# Patient Record
Sex: FEMALE | Race: WHITE | NOT HISPANIC OR LATINO
[De-identification: names, ages, dates, MRNs, and addresses within clinical notes are randomized per-mention and may not be internally consistent; named-entity substitution may affect disease eponyms.]

---

## 2017-01-19 ENCOUNTER — RX ONLY (OUTPATIENT)
Age: 57
Setting detail: RX ONLY
End: 2017-01-19

## 2017-08-16 ENCOUNTER — HOSPITAL ENCOUNTER (OUTPATIENT)
Dept: LAB | Facility: MEDICAL CENTER | Age: 57
End: 2017-08-16
Attending: NURSE PRACTITIONER
Payer: COMMERCIAL

## 2017-08-16 LAB
HCV AB SER QL: NEGATIVE
THYROPEROXIDASE AB SERPL-ACNC: 0.9 IU/ML (ref 0–9)

## 2017-08-16 PROCEDURE — 83516 IMMUNOASSAY NONANTIBODY: CPT | Mod: 91

## 2017-08-16 PROCEDURE — 86376 MICROSOMAL ANTIBODY EACH: CPT

## 2017-08-16 PROCEDURE — 86803 HEPATITIS C AB TEST: CPT

## 2017-08-16 PROCEDURE — 36415 COLL VENOUS BLD VENIPUNCTURE: CPT

## 2017-08-18 LAB
GLIADIN IGA SER IA-ACNC: 8 UNITS (ref 0–19)
GLIADIN IGG SER IA-ACNC: 5 UNITS (ref 0–19)
TTG IGA SER IA-ACNC: 0 U/ML (ref 0–3)
TTG IGG SER IA-ACNC: 1 U/ML (ref 0–5)

## 2017-11-20 ENCOUNTER — HOSPITAL ENCOUNTER (OUTPATIENT)
Dept: RADIOLOGY | Facility: MEDICAL CENTER | Age: 57
End: 2017-11-20
Attending: FAMILY MEDICINE
Payer: COMMERCIAL

## 2017-11-20 DIAGNOSIS — Z12.39 SCREENING BREAST EXAMINATION: ICD-10-CM

## 2017-11-20 PROCEDURE — G0202 SCR MAMMO BI INCL CAD: HCPCS

## 2017-12-05 ENCOUNTER — HOSPITAL ENCOUNTER (OUTPATIENT)
Dept: RADIOLOGY | Facility: MEDICAL CENTER | Age: 57
End: 2017-12-05
Attending: FAMILY MEDICINE
Payer: COMMERCIAL

## 2017-12-05 DIAGNOSIS — R92.30 DENSE BREAST TISSUE: ICD-10-CM

## 2017-12-05 PROCEDURE — 76641 ULTRASOUND BREAST COMPLETE: CPT

## 2018-01-12 ENCOUNTER — HOSPITAL ENCOUNTER (OUTPATIENT)
Dept: RADIOLOGY | Facility: MEDICAL CENTER | Age: 58
End: 2018-01-12
Attending: FAMILY MEDICINE
Payer: COMMERCIAL

## 2018-01-12 DIAGNOSIS — E03.9 PRIMARY HYPOTHYROIDISM: ICD-10-CM

## 2018-01-12 PROCEDURE — 76536 US EXAM OF HEAD AND NECK: CPT

## 2018-01-24 ENCOUNTER — APPOINTMENT (RX ONLY)
Dept: URBAN - METROPOLITAN AREA CLINIC 4 | Facility: CLINIC | Age: 58
Setting detail: DERMATOLOGY
End: 2018-01-24

## 2018-01-24 DIAGNOSIS — D485 NEOPLASM OF UNCERTAIN BEHAVIOR OF SKIN: ICD-10-CM

## 2018-01-24 DIAGNOSIS — L81.4 OTHER MELANIN HYPERPIGMENTATION: ICD-10-CM

## 2018-01-24 DIAGNOSIS — D18.0 HEMANGIOMA: ICD-10-CM

## 2018-01-24 DIAGNOSIS — L82.0 INFLAMED SEBORRHEIC KERATOSIS: ICD-10-CM

## 2018-01-24 DIAGNOSIS — D22 MELANOCYTIC NEVI: ICD-10-CM

## 2018-01-24 DIAGNOSIS — L82.1 OTHER SEBORRHEIC KERATOSIS: ICD-10-CM

## 2018-01-24 PROBLEM — D18.01 HEMANGIOMA OF SKIN AND SUBCUTANEOUS TISSUE: Status: ACTIVE | Noted: 2018-01-24

## 2018-01-24 PROBLEM — E03.9 HYPOTHYROIDISM, UNSPECIFIED: Status: ACTIVE | Noted: 2018-01-24

## 2018-01-24 PROBLEM — D22.5 MELANOCYTIC NEVI OF TRUNK: Status: ACTIVE | Noted: 2018-01-24

## 2018-01-24 PROBLEM — D22.62 MELANOCYTIC NEVI OF LEFT UPPER LIMB, INCLUDING SHOULDER: Status: ACTIVE | Noted: 2018-01-24

## 2018-01-24 PROBLEM — D22.61 MELANOCYTIC NEVI OF RIGHT UPPER LIMB, INCLUDING SHOULDER: Status: ACTIVE | Noted: 2018-01-24

## 2018-01-24 PROBLEM — D48.5 NEOPLASM OF UNCERTAIN BEHAVIOR OF SKIN: Status: ACTIVE | Noted: 2018-01-24

## 2018-01-24 PROBLEM — D22.72 MELANOCYTIC NEVI OF LEFT LOWER LIMB, INCLUDING HIP: Status: ACTIVE | Noted: 2018-01-24

## 2018-01-24 PROBLEM — D22.71 MELANOCYTIC NEVI OF RIGHT LOWER LIMB, INCLUDING HIP: Status: ACTIVE | Noted: 2018-01-24

## 2018-01-24 PROCEDURE — 11100: CPT | Mod: 59

## 2018-01-24 PROCEDURE — 99214 OFFICE O/P EST MOD 30 MIN: CPT | Mod: 25

## 2018-01-24 PROCEDURE — ? LIQUID NITROGEN

## 2018-01-24 PROCEDURE — ? COUNSELING

## 2018-01-24 PROCEDURE — ? SUNSCREEN RECOMMENDATIONS

## 2018-01-24 PROCEDURE — ? BIOPSY BY SHAVE METHOD

## 2018-01-24 PROCEDURE — 17110 DESTRUCTION B9 LES UP TO 14: CPT

## 2018-01-24 ASSESSMENT — LOCATION DETAILED DESCRIPTION DERM
LOCATION DETAILED: LEFT INFERIOR ANTERIOR NECK
LOCATION DETAILED: RIGHT CENTRAL MALAR CHEEK
LOCATION DETAILED: RIGHT DISTAL POSTERIOR UPPER ARM
LOCATION DETAILED: RIGHT PROXIMAL DORSAL FOREARM
LOCATION DETAILED: RIGHT RADIAL DORSAL HAND
LOCATION DETAILED: LEFT CENTRAL MALAR CHEEK
LOCATION DETAILED: LEFT PROXIMAL POSTERIOR UPPER ARM
LOCATION DETAILED: LEFT SUPERIOR MEDIAL UPPER BACK
LOCATION DETAILED: RIGHT SUPERIOR MEDIAL MIDBACK
LOCATION DETAILED: SUPERIOR THORACIC SPINE
LOCATION DETAILED: RIGHT ANTERIOR PROXIMAL THIGH
LOCATION DETAILED: LEFT ULNAR DORSAL HAND
LOCATION DETAILED: PERIUMBILICAL SKIN
LOCATION DETAILED: RIGHT RIB CAGE
LOCATION DETAILED: RIGHT MEDIAL UPPER BACK
LOCATION DETAILED: LEFT PROXIMAL DORSAL FOREARM
LOCATION DETAILED: LEFT ANTERIOR PROXIMAL THIGH

## 2018-01-24 ASSESSMENT — LOCATION SIMPLE DESCRIPTION DERM
LOCATION SIMPLE: LEFT POSTERIOR UPPER ARM
LOCATION SIMPLE: RIGHT CHEEK
LOCATION SIMPLE: LEFT ANTERIOR NECK
LOCATION SIMPLE: RIGHT LOWER BACK
LOCATION SIMPLE: ABDOMEN
LOCATION SIMPLE: LEFT CHEEK
LOCATION SIMPLE: LEFT FOREARM
LOCATION SIMPLE: RIGHT UPPER BACK
LOCATION SIMPLE: RIGHT FOREARM
LOCATION SIMPLE: UPPER BACK
LOCATION SIMPLE: RIGHT POSTERIOR UPPER ARM
LOCATION SIMPLE: LEFT THIGH
LOCATION SIMPLE: RIGHT HAND
LOCATION SIMPLE: LEFT UPPER BACK
LOCATION SIMPLE: LEFT HAND
LOCATION SIMPLE: RIGHT THIGH

## 2018-01-24 ASSESSMENT — LOCATION ZONE DERM
LOCATION ZONE: NECK
LOCATION ZONE: HAND
LOCATION ZONE: ARM
LOCATION ZONE: FACE
LOCATION ZONE: LEG
LOCATION ZONE: TRUNK

## 2018-01-24 NOTE — PROCEDURE: BIOPSY BY SHAVE METHOD
Bill [id number] For Specimen Handling/Conveyance To Laboratory?: no
Cryotherapy Text: The wound bed was treated with cryotherapy after the biopsy was performed.
Wound Care: Vaseline
Detail Level: Detailed
Electrodesiccation And Curettage Text: The wound bed was treated with electrodesiccation and curettage after the biopsy was performed.
X Size Of Lesion In Cm: 0
Silver Nitrate Text: The wound bed was treated with silver nitrate after the biopsy was performed.
Consent: Written consent was obtained and risks were reviewed including but not limited to scarring, infection, bleeding, scabbing, incomplete removal, nerve damage and allergy to anesthesia.
Anesthesia Volume In Cc: 2
Notification Instructions: Patient will be notified of biopsy results. However, patient instructed to call the office if not contacted within 2 weeks.
Billing Type: Third-Party Bill
Destruction After The Procedure: Yes
Lab Facility: 
Biopsy Method: Personna blade
Type Of Destruction Used: Electrodesiccation and Curettage
Electrodesiccation Text: The wound bed was treated with electrodesiccation after the biopsy was performed.
Anesthesia Type: 1% lidocaine with epinephrine
Hemostasis: Aluminum Chloride and Electrocautery
Lab: [id number]
Biopsy Type: H and E
Post-Care Instructions: I reviewed with the patient in detail post-care instructions. Patient is to keep the biopsy site dry overnight, and then apply bacitracin twice daily until healed. Patient may apply hydrogen peroxide soaks to remove any crusting.
Dressing: bandage
Curettage Text: The wound bed was treated with curettage after the biopsy was performed.

## 2018-01-24 NOTE — PROCEDURE: LIQUID NITROGEN
Consent: The patient's consent was obtained including but not limited to risks of crusting, scabbing, blistering, scarring, darker or lighter pigmentary change, recurrence, incomplete removal and infection.
Medical Necessity Clause: This procedure was medically necessary because the lesions that were treated were:
Post-Care Instructions: I reviewed with the patient in detail post-care instructions. Patient is to wear sunprotection, and avoid picking at any of the treated lesions. Pt may apply Vaseline to crusted or scabbing areas.
Include Z78.9 (Other Specified Conditions Influencing Health Status) As An Associated Diagnosis?: No
Detail Level: Detailed
Medical Necessity Information: It is in your best interest to select a reason for this procedure from the list below. All of these items fulfill various CMS LCD requirements except the new and changing color options.

## 2018-04-05 ENCOUNTER — APPOINTMENT (RX ONLY)
Dept: URBAN - METROPOLITAN AREA CLINIC 4 | Facility: CLINIC | Age: 58
Setting detail: DERMATOLOGY
End: 2018-04-05

## 2018-04-05 DIAGNOSIS — L57.0 ACTINIC KERATOSIS: ICD-10-CM

## 2018-04-05 PROCEDURE — ? COUNSELING

## 2018-04-05 PROCEDURE — 17000 DESTRUCT PREMALG LESION: CPT

## 2018-04-05 PROCEDURE — ? LIQUID NITROGEN

## 2018-04-05 ASSESSMENT — LOCATION DETAILED DESCRIPTION DERM
LOCATION DETAILED: RIGHT DISTAL PRETIBIAL REGION
LOCATION DETAILED: LEFT ANTECUBITAL SKIN

## 2018-04-05 ASSESSMENT — LOCATION SIMPLE DESCRIPTION DERM
LOCATION SIMPLE: LEFT UPPER ARM
LOCATION SIMPLE: RIGHT PRETIBIAL REGION

## 2018-04-05 ASSESSMENT — LOCATION ZONE DERM
LOCATION ZONE: ARM
LOCATION ZONE: LEG

## 2018-04-05 NOTE — PROCEDURE: COUNSELING
Detail Level: Zone
Patient Specific Counseling (Will Not Stick From Patient To Patient): Area recently biopsied. Clear margins.

## 2018-06-12 ENCOUNTER — HOSPITAL ENCOUNTER (OUTPATIENT)
Dept: LAB | Facility: MEDICAL CENTER | Age: 58
End: 2018-06-12
Attending: NURSE PRACTITIONER
Payer: COMMERCIAL

## 2018-06-12 LAB
25(OH)D3 SERPL-MCNC: 59 NG/ML (ref 30–100)
ALBUMIN SERPL BCP-MCNC: 4.2 G/DL (ref 3.2–4.9)
ALBUMIN/GLOB SERPL: 1.6 G/DL
ALP SERPL-CCNC: 38 U/L (ref 30–99)
ALT SERPL-CCNC: 17 U/L (ref 2–50)
ANION GAP SERPL CALC-SCNC: 6 MMOL/L (ref 0–11.9)
AST SERPL-CCNC: 19 U/L (ref 12–45)
BASOPHILS # BLD AUTO: 1 % (ref 0–1.8)
BASOPHILS # BLD: 0.06 K/UL (ref 0–0.12)
BILIRUB SERPL-MCNC: 0.3 MG/DL (ref 0.1–1.5)
BUN SERPL-MCNC: 20 MG/DL (ref 8–22)
CALCIUM SERPL-MCNC: 8.7 MG/DL (ref 8.5–10.5)
CHLORIDE SERPL-SCNC: 105 MMOL/L (ref 96–112)
CHOLEST SERPL-MCNC: 158 MG/DL (ref 100–199)
CO2 SERPL-SCNC: 26 MMOL/L (ref 20–33)
CREAT SERPL-MCNC: 0.87 MG/DL (ref 0.5–1.4)
DHEA-S SERPL-MCNC: 67.7 UG/DL (ref 18.9–205)
EOSINOPHIL # BLD AUTO: 0.15 K/UL (ref 0–0.51)
EOSINOPHIL NFR BLD: 2.6 % (ref 0–6.9)
ERYTHROCYTE [DISTWIDTH] IN BLOOD BY AUTOMATED COUNT: 47.4 FL (ref 35.9–50)
ESTRADIOL SERPL-MCNC: 33 PG/ML
GLOBULIN SER CALC-MCNC: 2.7 G/DL (ref 1.9–3.5)
GLUCOSE SERPL-MCNC: 85 MG/DL (ref 65–99)
HCT VFR BLD AUTO: 40.5 % (ref 37–47)
HDLC SERPL-MCNC: 89 MG/DL
HGB BLD-MCNC: 13.3 G/DL (ref 12–16)
IMM GRANULOCYTES # BLD AUTO: 0.02 K/UL (ref 0–0.11)
IMM GRANULOCYTES NFR BLD AUTO: 0.3 % (ref 0–0.9)
LDLC SERPL CALC-MCNC: 62 MG/DL
LYMPHOCYTES # BLD AUTO: 1.54 K/UL (ref 1–4.8)
LYMPHOCYTES NFR BLD: 26.2 % (ref 22–41)
MCH RBC QN AUTO: 31 PG (ref 27–33)
MCHC RBC AUTO-ENTMCNC: 32.8 G/DL (ref 33.6–35)
MCV RBC AUTO: 94.4 FL (ref 81.4–97.8)
MONOCYTES # BLD AUTO: 0.36 K/UL (ref 0–0.85)
MONOCYTES NFR BLD AUTO: 6.1 % (ref 0–13.4)
NEUTROPHILS # BLD AUTO: 3.74 K/UL (ref 2–7.15)
NEUTROPHILS NFR BLD: 63.8 % (ref 44–72)
NRBC # BLD AUTO: 0 K/UL
NRBC BLD-RTO: 0 /100 WBC
PLATELET # BLD AUTO: 209 K/UL (ref 164–446)
PMV BLD AUTO: 12.9 FL (ref 9–12.9)
POTASSIUM SERPL-SCNC: 4.2 MMOL/L (ref 3.6–5.5)
PROGEST SERPL-MCNC: 0.95 NG/ML
PROT SERPL-MCNC: 6.9 G/DL (ref 6–8.2)
RBC # BLD AUTO: 4.29 M/UL (ref 4.2–5.4)
SODIUM SERPL-SCNC: 137 MMOL/L (ref 135–145)
T3FREE SERPL-MCNC: 2.68 PG/ML (ref 2.4–4.2)
T4 FREE SERPL-MCNC: 0.75 NG/DL (ref 0.53–1.43)
TRIGL SERPL-MCNC: 35 MG/DL (ref 0–149)
TSH SERPL DL<=0.005 MIU/L-ACNC: 1.08 UIU/ML (ref 0.38–5.33)
WBC # BLD AUTO: 5.9 K/UL (ref 4.8–10.8)

## 2018-06-12 PROCEDURE — 85025 COMPLETE CBC W/AUTO DIFF WBC: CPT

## 2018-06-12 PROCEDURE — 82306 VITAMIN D 25 HYDROXY: CPT

## 2018-06-12 PROCEDURE — 82670 ASSAY OF TOTAL ESTRADIOL: CPT

## 2018-06-12 PROCEDURE — 82627 DEHYDROEPIANDROSTERONE: CPT

## 2018-06-12 PROCEDURE — 84144 ASSAY OF PROGESTERONE: CPT

## 2018-06-12 PROCEDURE — 80053 COMPREHEN METABOLIC PANEL: CPT

## 2018-06-12 PROCEDURE — 84439 ASSAY OF FREE THYROXINE: CPT

## 2018-06-12 PROCEDURE — 36415 COLL VENOUS BLD VENIPUNCTURE: CPT

## 2018-06-12 PROCEDURE — 80061 LIPID PANEL: CPT

## 2018-06-12 PROCEDURE — 84402 ASSAY OF FREE TESTOSTERONE: CPT

## 2018-06-12 PROCEDURE — 84443 ASSAY THYROID STIM HORMONE: CPT

## 2018-06-12 PROCEDURE — 84481 FREE ASSAY (FT-3): CPT

## 2018-06-17 LAB — TESTOST FREE SERPL-MCNC: 1.7 PG/ML (ref 0.6–3.8)

## 2018-07-10 ENCOUNTER — APPOINTMENT (RX ONLY)
Dept: URBAN - METROPOLITAN AREA CLINIC 4 | Facility: CLINIC | Age: 58
Setting detail: DERMATOLOGY
End: 2018-07-10

## 2018-07-10 DIAGNOSIS — L81.4 OTHER MELANIN HYPERPIGMENTATION: ICD-10-CM

## 2018-07-10 DIAGNOSIS — L82.0 INFLAMED SEBORRHEIC KERATOSIS: ICD-10-CM

## 2018-07-10 DIAGNOSIS — D485 NEOPLASM OF UNCERTAIN BEHAVIOR OF SKIN: ICD-10-CM

## 2018-07-10 DIAGNOSIS — D18.0 HEMANGIOMA: ICD-10-CM

## 2018-07-10 DIAGNOSIS — L82.1 OTHER SEBORRHEIC KERATOSIS: ICD-10-CM

## 2018-07-10 DIAGNOSIS — D22 MELANOCYTIC NEVI: ICD-10-CM

## 2018-07-10 PROBLEM — D22.5 MELANOCYTIC NEVI OF TRUNK: Status: ACTIVE | Noted: 2018-07-10

## 2018-07-10 PROBLEM — D18.01 HEMANGIOMA OF SKIN AND SUBCUTANEOUS TISSUE: Status: ACTIVE | Noted: 2018-07-10

## 2018-07-10 PROBLEM — D22.61 MELANOCYTIC NEVI OF RIGHT UPPER LIMB, INCLUDING SHOULDER: Status: ACTIVE | Noted: 2018-07-10

## 2018-07-10 PROBLEM — D22.71 MELANOCYTIC NEVI OF RIGHT LOWER LIMB, INCLUDING HIP: Status: ACTIVE | Noted: 2018-07-10

## 2018-07-10 PROBLEM — D22.72 MELANOCYTIC NEVI OF LEFT LOWER LIMB, INCLUDING HIP: Status: ACTIVE | Noted: 2018-07-10

## 2018-07-10 PROBLEM — D48.5 NEOPLASM OF UNCERTAIN BEHAVIOR OF SKIN: Status: ACTIVE | Noted: 2018-07-10

## 2018-07-10 PROBLEM — D22.62 MELANOCYTIC NEVI OF LEFT UPPER LIMB, INCLUDING SHOULDER: Status: ACTIVE | Noted: 2018-07-10

## 2018-07-10 PROCEDURE — ? LIQUID NITROGEN

## 2018-07-10 PROCEDURE — ? COUNSELING

## 2018-07-10 PROCEDURE — 17110 DESTRUCTION B9 LES UP TO 14: CPT

## 2018-07-10 PROCEDURE — 11100: CPT | Mod: 59

## 2018-07-10 PROCEDURE — ? SUNSCREEN RECOMMENDATIONS

## 2018-07-10 PROCEDURE — ? BIOPSY BY SHAVE METHOD

## 2018-07-10 PROCEDURE — 99213 OFFICE O/P EST LOW 20 MIN: CPT | Mod: 25

## 2018-07-10 ASSESSMENT — LOCATION SIMPLE DESCRIPTION DERM
LOCATION SIMPLE: LEFT ANTERIOR NECK
LOCATION SIMPLE: RIGHT HAND
LOCATION SIMPLE: LEFT FOREARM
LOCATION SIMPLE: LEFT UPPER BACK
LOCATION SIMPLE: RIGHT LOWER BACK
LOCATION SIMPLE: UPPER BACK
LOCATION SIMPLE: LEFT HAND
LOCATION SIMPLE: LEFT FOREHEAD
LOCATION SIMPLE: CHEST
LOCATION SIMPLE: LEFT TEMPLE
LOCATION SIMPLE: RIGHT CHEEK
LOCATION SIMPLE: RIGHT THIGH
LOCATION SIMPLE: LEFT POSTERIOR UPPER ARM
LOCATION SIMPLE: RIGHT POSTERIOR UPPER ARM
LOCATION SIMPLE: ABDOMEN
LOCATION SIMPLE: RIGHT FOREARM
LOCATION SIMPLE: LEFT THIGH
LOCATION SIMPLE: LEFT CHEEK
LOCATION SIMPLE: LEFT ZYGOMA

## 2018-07-10 ASSESSMENT — LOCATION DETAILED DESCRIPTION DERM
LOCATION DETAILED: PERIUMBILICAL SKIN
LOCATION DETAILED: RIGHT LATERAL SUPERIOR CHEST
LOCATION DETAILED: LEFT CENTRAL MALAR CHEEK
LOCATION DETAILED: LEFT ULNAR DORSAL HAND
LOCATION DETAILED: LEFT INFERIOR LATERAL FOREHEAD
LOCATION DETAILED: LEFT MID PREAURICULAR CHEEK
LOCATION DETAILED: RIGHT SUPERIOR LATERAL MALAR CHEEK
LOCATION DETAILED: RIGHT DISTAL POSTERIOR UPPER ARM
LOCATION DETAILED: LEFT INFERIOR ANTERIOR NECK
LOCATION DETAILED: RIGHT CENTRAL MALAR CHEEK
LOCATION DETAILED: LEFT PROXIMAL POSTERIOR UPPER ARM
LOCATION DETAILED: RIGHT RIB CAGE
LOCATION DETAILED: RIGHT ANTERIOR PROXIMAL THIGH
LOCATION DETAILED: RIGHT RADIAL DORSAL HAND
LOCATION DETAILED: LEFT ANTERIOR PROXIMAL THIGH
LOCATION DETAILED: RIGHT SUPERIOR MEDIAL MIDBACK
LOCATION DETAILED: LEFT CENTRAL TEMPLE
LOCATION DETAILED: SUPERIOR THORACIC SPINE
LOCATION DETAILED: LEFT SUPERIOR LATERAL BUCCAL CHEEK
LOCATION DETAILED: LEFT CENTRAL ZYGOMA
LOCATION DETAILED: RIGHT PROXIMAL DORSAL FOREARM
LOCATION DETAILED: LEFT PROXIMAL DORSAL FOREARM
LOCATION DETAILED: LEFT INFERIOR CENTRAL MALAR CHEEK
LOCATION DETAILED: RIGHT LATERAL MALAR CHEEK
LOCATION DETAILED: LEFT SUPERIOR MEDIAL UPPER BACK
LOCATION DETAILED: RIGHT LATERAL BUCCAL CHEEK

## 2018-07-10 ASSESSMENT — LOCATION ZONE DERM
LOCATION ZONE: ARM
LOCATION ZONE: LEG
LOCATION ZONE: FACE
LOCATION ZONE: NECK
LOCATION ZONE: TRUNK
LOCATION ZONE: HAND

## 2018-07-19 ENCOUNTER — HOSPITAL ENCOUNTER (OUTPATIENT)
Dept: LAB | Facility: MEDICAL CENTER | Age: 58
End: 2018-07-19
Attending: FAMILY MEDICINE
Payer: COMMERCIAL

## 2018-07-19 PROCEDURE — 88175 CYTOPATH C/V AUTO FLUID REDO: CPT

## 2018-07-20 LAB — CYTOLOGY REG CYTOL: NORMAL

## 2018-08-04 ENCOUNTER — OFFICE VISIT (OUTPATIENT)
Dept: URGENT CARE | Facility: PHYSICIAN GROUP | Age: 58
End: 2018-08-04
Payer: COMMERCIAL

## 2018-08-04 VITALS
BODY MASS INDEX: 24.11 KG/M2 | RESPIRATION RATE: 14 BRPM | SYSTOLIC BLOOD PRESSURE: 118 MMHG | OXYGEN SATURATION: 96 % | HEIGHT: 66 IN | HEART RATE: 64 BPM | DIASTOLIC BLOOD PRESSURE: 78 MMHG | WEIGHT: 150 LBS | TEMPERATURE: 98.8 F

## 2018-08-04 DIAGNOSIS — J22 LRTI (LOWER RESPIRATORY TRACT INFECTION): ICD-10-CM

## 2018-08-04 PROCEDURE — 99214 OFFICE O/P EST MOD 30 MIN: CPT | Performed by: FAMILY MEDICINE

## 2018-08-04 RX ORDER — BENZONATATE 100 MG/1
100 CAPSULE ORAL 3 TIMES DAILY PRN
Qty: 60 CAP | Refills: 0 | Status: SHIPPED | OUTPATIENT
Start: 2018-08-04 | End: 2018-11-01

## 2018-08-04 RX ORDER — ASCORBIC ACID 500 MG
500 TABLET ORAL DAILY
COMMUNITY
End: 2020-10-27

## 2018-08-04 RX ORDER — AZITHROMYCIN 250 MG/1
TABLET, FILM COATED ORAL
Qty: 6 TAB | Refills: 0 | Status: SHIPPED | OUTPATIENT
Start: 2018-08-04 | End: 2018-11-01

## 2018-08-04 NOTE — PATIENT INSTRUCTIONS

## 2018-08-08 ASSESSMENT — ENCOUNTER SYMPTOMS
WHEEZING: 1
COUGH: 1
CHILLS: 0
SHORTNESS OF BREATH: 1
FEVER: 0

## 2018-08-08 NOTE — PROGRESS NOTES
"Subjective:   Lizy Saleem is a 58 y.o. female who presents for Cough (Headache x 11 days)        Cough   This is a new problem. The current episode started 1 to 4 weeks ago. The problem has been gradually worsening. The problem occurs every few minutes. The cough is productive of sputum. Associated symptoms include shortness of breath and wheezing. Pertinent negatives include no chills or fever.     Review of Systems   Constitutional: Negative for chills and fever.   Respiratory: Positive for cough, shortness of breath and wheezing.      Allergies   Allergen Reactions   • Bloodless       Objective:   /78   Pulse 64   Temp 37.1 °C (98.8 °F)   Resp 14   Ht 1.676 m (5' 6\")   Wt 68 kg (150 lb)   SpO2 96%   BMI 24.21 kg/m²   Physical Exam   Constitutional: She is oriented to person, place, and time. She appears well-developed and well-nourished. No distress.   HENT:   Head: Normocephalic and atraumatic.   Eyes: Pupils are equal, round, and reactive to light. Conjunctivae and EOM are normal.   Cardiovascular: Normal rate and regular rhythm.    No murmur heard.  Pulmonary/Chest: Effort normal. No respiratory distress. She has wheezes. She has no rales.   Abdominal: Soft. She exhibits no distension. There is no tenderness.   Neurological: She is alert and oriented to person, place, and time. She has normal reflexes. No sensory deficit.   Skin: Skin is warm and dry.   Psychiatric: She has a normal mood and affect.         Assessment/Plan:   Assessment    1. LRTI (lower respiratory tract infection)  - azithromycin (ZITHROMAX) 250 MG Tab; Take 2 tablets by mouth on day one. Take one tablet by mouth the remaining days until gone  Dispense: 6 Tab; Refill: 0  - benzonatate (TESSALON) 100 MG Cap; Take 1 Cap by mouth 3 times a day as needed for Cough.  Dispense: 60 Cap; Refill: 0    Other orders  - ascorbic acid (ASCORBIC ACID) 500 MG Tab; Take 500 mg by mouth every day.  - PROGESTERONE, VAGINAL, 4 % Gel; " Insert  in vagina.    Differential diagnosis, natural history, supportive care, and indications for immediate follow-up discussed.

## 2018-10-08 ENCOUNTER — OFFICE VISIT (OUTPATIENT)
Dept: URGENT CARE | Facility: PHYSICIAN GROUP | Age: 58
End: 2018-10-08
Payer: COMMERCIAL

## 2018-10-08 VITALS
BODY MASS INDEX: 24.11 KG/M2 | RESPIRATION RATE: 16 BRPM | WEIGHT: 150 LBS | TEMPERATURE: 97 F | HEART RATE: 69 BPM | OXYGEN SATURATION: 98 % | SYSTOLIC BLOOD PRESSURE: 124 MMHG | DIASTOLIC BLOOD PRESSURE: 92 MMHG | HEIGHT: 66 IN

## 2018-10-08 DIAGNOSIS — J01.00 ACUTE NON-RECURRENT MAXILLARY SINUSITIS: ICD-10-CM

## 2018-10-08 PROCEDURE — 99214 OFFICE O/P EST MOD 30 MIN: CPT | Performed by: FAMILY MEDICINE

## 2018-10-08 RX ORDER — ACETAMINOPHEN 325 MG/1
650 TABLET ORAL EVERY 4 HOURS PRN
COMMUNITY
End: 2020-12-23

## 2018-10-08 RX ORDER — AMOXICILLIN AND CLAVULANATE POTASSIUM 875; 125 MG/1; MG/1
1 TABLET, FILM COATED ORAL 2 TIMES DAILY
Qty: 14 TAB | Refills: 0 | Status: SHIPPED | OUTPATIENT
Start: 2018-10-08 | End: 2018-10-15

## 2018-10-08 NOTE — PROGRESS NOTES
"Subjective:   Lizy Saleem is a 58 y.o. female who presents for Cough ([id number] )        Cough   This is a new problem. The current episode started 1 to 4 weeks ago. The problem has been gradually worsening. The problem occurs every few minutes. The cough is productive of sputum. Associated symptoms include headaches, nasal congestion and postnasal drip. Pertinent negatives include no chills, fever, rhinorrhea, shortness of breath or wheezing. Nothing aggravates the symptoms.     Review of Systems   Constitutional: Negative for chills and fever.   HENT: Positive for postnasal drip. Negative for rhinorrhea.    Respiratory: Positive for cough. Negative for shortness of breath and wheezing.    Neurological: Positive for headaches.     Allergies   Allergen Reactions   • Bloodless       Objective:   /92 (BP Location: Left arm, Patient Position: Sitting, BP Cuff Size: Adult)   Pulse 69   Temp 36.1 °C (97 °F) (Temporal)   Resp 16   Ht 1.676 m (5' 6\")   Wt 68 kg (150 lb)   SpO2 98%   BMI 24.21 kg/m²   Physical Exam   Constitutional: She is oriented to person, place, and time. She appears well-developed and well-nourished. No distress.   HENT:   Head: Normocephalic and atraumatic.   Nose: Mucosal edema and rhinorrhea present. Right sinus exhibits maxillary sinus tenderness. Left sinus exhibits maxillary sinus tenderness.   Eyes: Pupils are equal, round, and reactive to light. Conjunctivae and EOM are normal.   Cardiovascular: Normal rate and regular rhythm.    No murmur heard.  Pulmonary/Chest: Effort normal and breath sounds normal. No respiratory distress. She has no wheezes. She has no rales.   Abdominal: Soft. She exhibits no distension. There is no tenderness.   Neurological: She is alert and oriented to person, place, and time. She has normal reflexes. No sensory deficit.   Skin: Skin is warm and dry.   Psychiatric: She has a normal mood and affect.         Assessment/Plan:   Assessment  "   1. Acute non-recurrent maxillary sinusitis  - amoxicillin-clavulanate (AUGMENTIN) 875-125 MG Tab; Take 1 Tab by mouth 2 times a day for 7 days.  Dispense: 14 Tab; Refill: 0    Other orders  - acetaminophen (TYLENOL) 325 MG Tab; Take 650 mg by mouth every four hours as needed.    Differential diagnosis, natural history, supportive care, and indications for immediate follow-up discussed.

## 2018-10-11 ASSESSMENT — ENCOUNTER SYMPTOMS
COUGH: 1
FEVER: 0
WHEEZING: 0
SHORTNESS OF BREATH: 0
HEADACHES: 1
RHINORRHEA: 0
CHILLS: 0

## 2018-10-29 ENCOUNTER — APPOINTMENT (RX ONLY)
Dept: URBAN - METROPOLITAN AREA CLINIC 4 | Facility: CLINIC | Age: 58
Setting detail: DERMATOLOGY
End: 2018-10-29

## 2018-10-29 DIAGNOSIS — L57.0 ACTINIC KERATOSIS: ICD-10-CM

## 2018-10-29 DIAGNOSIS — L82.0 INFLAMED SEBORRHEIC KERATOSIS: ICD-10-CM

## 2018-10-29 DIAGNOSIS — L82.1 OTHER SEBORRHEIC KERATOSIS: ICD-10-CM

## 2018-10-29 DIAGNOSIS — L81.4 OTHER MELANIN HYPERPIGMENTATION: ICD-10-CM

## 2018-10-29 PROBLEM — D48.5 NEOPLASM OF UNCERTAIN BEHAVIOR OF SKIN: Status: ACTIVE | Noted: 2018-10-29

## 2018-10-29 PROCEDURE — ? BIOPSY BY SHAVE METHOD

## 2018-10-29 PROCEDURE — ? ADDITIONAL NOTES

## 2018-10-29 PROCEDURE — 11100: CPT | Mod: 59

## 2018-10-29 PROCEDURE — 17000 DESTRUCT PREMALG LESION: CPT

## 2018-10-29 PROCEDURE — 99212 OFFICE O/P EST SF 10 MIN: CPT | Mod: 25

## 2018-10-29 PROCEDURE — ? COUNSELING

## 2018-10-29 PROCEDURE — ? LIQUID NITROGEN

## 2018-10-29 ASSESSMENT — LOCATION SIMPLE DESCRIPTION DERM
LOCATION SIMPLE: NOSE
LOCATION SIMPLE: RIGHT POSTERIOR UPPER ARM
LOCATION SIMPLE: LEFT HAND
LOCATION SIMPLE: RIGHT HAND

## 2018-10-29 ASSESSMENT — LOCATION DETAILED DESCRIPTION DERM
LOCATION DETAILED: RIGHT ULNAR DORSAL HAND
LOCATION DETAILED: RIGHT DISTAL POSTERIOR UPPER ARM
LOCATION DETAILED: NASAL DORSUM
LOCATION DETAILED: LEFT ULNAR DORSAL HAND
LOCATION DETAILED: RIGHT RADIAL DORSAL HAND

## 2018-10-29 ASSESSMENT — LOCATION ZONE DERM
LOCATION ZONE: NOSE
LOCATION ZONE: ARM
LOCATION ZONE: HAND

## 2018-10-29 NOTE — PROCEDURE: BIOPSY BY SHAVE METHOD
Anesthesia Type: 1% lidocaine with epinephrine
Curettage Text: The wound bed was treated with curettage after the biopsy was performed.
Electrodesiccation Text: The wound bed was treated with electrodesiccation after the biopsy was performed.
Lab Facility: 
Detail Level: Detailed
Type Of Destruction Used: Curettage
Post-Care Instructions: I reviewed with the patient in detail post-care instructions. Patient is to keep the biopsy site dry overnight, and then apply bacitracin twice daily until healed. Patient may apply hydrogen peroxide soaks to remove any crusting.
X Size Of Lesion In Cm: 0
Destruction After The Procedure: No
Was A Bandage Applied: Yes
Consent: Written consent was obtained and risks were reviewed including but not limited to scarring, infection, bleeding, scabbing, incomplete removal, nerve damage and allergy to anesthesia.
Billing Type: Third-Party Bill
Hemostasis: Drysol
Biopsy Type: H and E
Electrodesiccation And Curettage Text: The wound bed was treated with electrodesiccation and curettage after the biopsy was performed.
Lab: [id number]
Notification Instructions: Patient will be notified of biopsy results. However, patient instructed to call the office if not contacted within 2 weeks.
Dressing: bandage
Cryotherapy Text: The wound bed was treated with cryotherapy after the biopsy was performed.
Anesthesia Volume In Cc: 2
Biopsy Method: Personna blade
Silver Nitrate Text: The wound bed was treated with silver nitrate after the biopsy was performed.
Depth Of Biopsy: dermis
Wound Care: Bacitracin

## 2018-10-29 NOTE — PROCEDURE: LIQUID NITROGEN
Consent: The patient's consent was obtained including but not limited to risks of crusting, scabbing, blistering, scarring, darker or lighter pigmentary change, recurrence, incomplete removal and infection.
Number Of Freeze-Thaw Cycles: 2 freeze-thaw cycles
Render Post-Care Instructions In Note?: yes
Post-Care Instructions: I reviewed with the patient in detail post-care instructions. Patient is to wear sunprotection, and avoid picking at any of the treated lesions. Pt may apply Vaseline to crusted or scabbing areas.
Duration Of Freeze Thaw-Cycle (Seconds): 3
Detail Level: Simple

## 2018-10-29 NOTE — HPI: SKIN LESION
Is This A New Presentation, Or A Follow-Up?: Growths
What Type Of Note Output Would You Prefer (Optional)?: Standard Output
How Severe Is Your Skin Lesion?: severe
Has Your Skin Lesion Been Treated?: not been treated

## 2018-11-01 ENCOUNTER — OFFICE VISIT (OUTPATIENT)
Dept: MEDICAL GROUP | Facility: MEDICAL CENTER | Age: 58
End: 2018-11-01
Payer: COMMERCIAL

## 2018-11-01 VITALS
OXYGEN SATURATION: 98 % | WEIGHT: 151 LBS | HEART RATE: 68 BPM | RESPIRATION RATE: 16 BRPM | SYSTOLIC BLOOD PRESSURE: 120 MMHG | DIASTOLIC BLOOD PRESSURE: 78 MMHG | HEIGHT: 66 IN | TEMPERATURE: 98.2 F | BODY MASS INDEX: 24.27 KG/M2

## 2018-11-01 DIAGNOSIS — G47.39 OTHER SLEEP APNEA: ICD-10-CM

## 2018-11-01 DIAGNOSIS — N95.1 MENOPAUSAL SYMPTOM: ICD-10-CM

## 2018-11-01 DIAGNOSIS — E06.3 HYPOTHYROIDISM DUE TO HASHIMOTO'S THYROIDITIS: ICD-10-CM

## 2018-11-01 DIAGNOSIS — E03.8 HYPOTHYROIDISM DUE TO HASHIMOTO'S THYROIDITIS: ICD-10-CM

## 2018-11-01 DIAGNOSIS — G25.81 RESTLESS LEGS: ICD-10-CM

## 2018-11-01 DIAGNOSIS — F41.1 ANXIETY STATE: ICD-10-CM

## 2018-11-01 PROBLEM — Z80.41 FAMILY HISTORY OF MALIGNANT NEOPLASM OF OVARY: Status: RESOLVED | Noted: 2017-11-17 | Resolved: 2018-11-01

## 2018-11-01 PROBLEM — Z80.41 FAMILY HISTORY OF MALIGNANT NEOPLASM OF OVARY: Status: ACTIVE | Noted: 2017-11-17

## 2018-11-01 PROBLEM — N64.52 BREAST DISCHARGE: Status: ACTIVE | Noted: 2018-11-01

## 2018-11-01 PROBLEM — Z00.00 HEALTH CARE MAINTENANCE: Status: ACTIVE | Noted: 2018-11-01

## 2018-11-01 PROBLEM — Z80.3 FAMILY HISTORY OF MALIGNANT NEOPLASM OF BREAST: Status: ACTIVE | Noted: 2017-11-17

## 2018-11-01 PROBLEM — E34.9 DISORDER OF ENDOCRINE SYSTEM: Status: RESOLVED | Noted: 2018-11-01 | Resolved: 2018-11-01

## 2018-11-01 PROBLEM — N64.52 BREAST DISCHARGE: Status: RESOLVED | Noted: 2018-11-01 | Resolved: 2018-11-01

## 2018-11-01 PROBLEM — Z01.419 NORMAL PELVIC EXAM: Status: ACTIVE | Noted: 2018-07-19

## 2018-11-01 PROBLEM — E34.9 DISORDER OF ENDOCRINE SYSTEM: Status: ACTIVE | Noted: 2018-11-01

## 2018-11-01 PROBLEM — E03.9 HYPOTHYROIDISM: Status: ACTIVE | Noted: 2018-11-01

## 2018-11-01 PROBLEM — Z01.419 NORMAL PELVIC EXAM: Status: RESOLVED | Noted: 2018-07-19 | Resolved: 2018-11-01

## 2018-11-01 PROCEDURE — 99214 OFFICE O/P EST MOD 30 MIN: CPT | Performed by: NURSE PRACTITIONER

## 2018-11-01 RX ORDER — LEVOTHYROXINE SODIUM 88 UG/1
88 TABLET ORAL
COMMUNITY
End: 2019-03-04 | Stop reason: SDUPTHER

## 2018-11-01 RX ORDER — VALACYCLOVIR HYDROCHLORIDE 1 G/1
TABLET, FILM COATED ORAL
COMMUNITY
End: 2019-02-08 | Stop reason: SDUPTHER

## 2018-11-01 NOTE — PATIENT INSTRUCTIONS
Restless Legs Syndrome  Introduction  Restless legs syndrome is a condition that causes uncomfortable feelings or sensations in the legs, especially while sitting or lying down. The sensations usually cause an overwhelming urge to move the legs. The arms can also sometimes be affected.  The condition can range from mild to severe. The symptoms often interfere with a person's ability to sleep.  What are the causes?  The cause of this condition is not known.  What increases the risk?  This condition is more likely to develop in:  · People who are older than age 50.  · Pregnant women. In general, restless legs syndrome is more common in women than in men.  · People who have a family history of the condition.  · People who have certain medical conditions, such as iron deficiency, kidney disease, Parkinson disease, or nerve damage.  · People who take certain medicines, such as medicines for high blood pressure, nausea, colds, allergies, depression, and some heart conditions.  What are the signs or symptoms?  The main symptom of this condition is uncomfortable sensations in the legs. These sensations may be:  · Described as pulling, tingling, prickling, throbbing, crawling, or burning.  · Worse while you are sitting or lying down.  · Worse during periods of rest or inactivity.  · Worse at night, often interfering with your sleep.  · Accompanied by a very strong urge to move your legs.  · Temporarily relieved by movement of your legs.  The sensations usually affect both sides of the body. The arms can also be affected, but this is rare. People who have this condition often have tiredness during the day because of their lack of sleep at night.  How is this diagnosed?  This condition may be diagnosed based on your description of the symptoms. You may also have tests, including blood tests, to check for other conditions that may lead to your symptoms. In some cases, you may be asked to spend some time in a sleep lab so your  sleeping can be monitored.  How is this treated?  Treatment for this condition is focused on managing the symptoms. Treatment may include:  · Self-help and lifestyle changes.  · Medicines.  Follow these instructions at home:  · Take medicines only as directed by your health care provider.  · Try these methods to get temporary relief from the uncomfortable sensations:  ¨ Massage your legs.  ¨ Walk or stretch.  ¨ Take a cold or hot bath.  · Practice good sleep habits. For example, go to bed and get up at the same time every day.  · Exercise regularly.  · Practice ways of relaxing, such as yoga or meditation.  · Avoid caffeine and alcohol.  · Do not use any tobacco products, including cigarettes, chewing tobacco, or electronic cigarettes. If you need help quitting, ask your health care provider.  · Keep all follow-up visits as directed by your health care provider. This is important.  Contact a health care provider if:  Your symptoms do not improve with treatment, or they get worse.  This information is not intended to replace advice given to you by your health care provider. Make sure you discuss any questions you have with your health care provider.  Document Released: 12/08/2003 Document Revised: 05/25/2017 Document Reviewed: 12/14/2015  © 2017 [employer]

## 2018-11-01 NOTE — ASSESSMENT & PLAN NOTE
Stable on progesterone and estrogen, gets medications at [facility]. Has been on hormone replacement for the past 2-3 years.

## 2018-11-01 NOTE — ASSESSMENT & PLAN NOTE
Symptoms started a few months ago, was more intermittent and now is almost nightly and with naps. Has been using a homeopathic medication for this which does work most of the time.

## 2018-11-01 NOTE — ASSESSMENT & PLAN NOTE
Stable on levothyroxine 88mcg daily for the past 4 years, last labs done in July 2018. Denies hair or skin changes, fatigue, constipation.

## 2018-11-01 NOTE — ASSESSMENT & PLAN NOTE
Dignosed by dentist/tmj specialist. Wears a dental appliance, thought to be caused by hormonal changes.

## 2018-11-02 NOTE — PROGRESS NOTES
Lizy Saleem is a 58 y.o. female here to establish care    HPI:    Anxiety state  Stable on effexor.     Hypothyroidism  Stable on levothyroxine 88mcg daily for the past 4 years, last labs done in July 2018. Denies hair or skin changes, fatigue, constipation.     Other sleep apnea  Dignosed by dentist/tmj specialist. Wears a dental appliance, thought to be caused by hormonal changes.     Menopausal symptom  Stable on progesterone and estrogen, gets medications at [facility]. Has been on hormone replacement for the past 2-3 years.     Restless legs  Symptoms started a few months ago, was more intermittent and now is almost nightly and with naps. Has been using a homeopathic medication for this which does work most of the time.     Current medicines (including changes today)  Current Outpatient Prescriptions   Medication Sig Dispense Refill   • levothyroxine (SYNTHROID) 88 MCG Tab Take 88 mcg by mouth Every morning on an empty stomach.     • venlafaxine XR (EFFEXOR XR) 75 MG CAPSULE SR 24 HR Take 150 mg by mouth every day.     • valacyclovir (VALTREX) 1 GM Tab valacyclovir 1 gram tablet   Take 1 tablet every 12 hours by oral route for 5 days.     • acetaminophen (TYLENOL) 325 MG Tab Take 650 mg by mouth every four hours as needed.     • ascorbic acid (ASCORBIC ACID) 500 MG Tab Take 500 mg by mouth every day.     • PROGESTERONE, VAGINAL, 4 % Gel Insert  in vagina.     • levothyroxine (SYNTHROID) 75 MCG Tab Take 75 mcg by mouth Every morning on an empty stomach.     • estradiol (ESTRACE) 0.5 MG tablet Insert 0.5 mg in vagina every day.     • Estradiol-Estriol-Progesterone (BIEST/PROGESTERONE) Cream Apply  to skin as directed every day. 25 mg to wrist nightly     • ibuprofen (MOTRIN) 200 MG Tab Take 400 mg by mouth every 6 hours as needed.       No current facility-administered medications for this visit.      She  has a past medical history of Anesthesia.  She  has a past surgical history  "that includes bunionectomy (Left, ); bunionectomy (Right, ); arthroscopy, knee (Left, ); hysteroscopy with video diagnostic (10/23/2015); and dilation and curettage (10/23/2015).  Social History   Substance Use Topics   • Smoking status: Never Smoker   • Smokeless tobacco: Never Used   • Alcohol use No     Social History     Social History Narrative   • No narrative on file     Family History   Problem Relation Age of Onset   • Breast Cancer Paternal Aunt    • Cancer Father    • Hyperlipidemia Father    • Hypertension Father    • Other Father         multiple aneurisms   • Thyroid Mother    • Cancer Mother         melanoma skin and eye   • Lung Disease Mother      Family Status   Relation Status   • PAunt    • Fa Alive   • Mo Alive         ROS  No chest pain, no abdominal pain, no rash.  Positive ROS as per HPI.  All other systems reviewed and are negative      Objective:     Blood pressure 120/78, pulse 68, temperature 36.8 °C (98.2 °F), temperature source Temporal, resp. rate 16, height 1.676 m (5' 5.98\"), weight 68.5 kg (151 lb), SpO2 98 %. Body mass index is 24.38 kg/m².     Physical Exam:    Constitutional: Alert, no distress.  Skin: Warm, dry, good turgor, no rashes in visible areas.  Eye: Equal, round, conjunctiva clear, lids normal.  ENMT: Lips without lesions, good dentition  Neck: Trachea midline.  Respiratory: Unlabored respiratory effort  Cardiovascular: No edema.  Psych: Alert and oriented x3, normal affect and mood.      Assessment and Plan:   The following treatment plan was discussed    1. Hypothyroidism due to Hashimoto's thyroiditis  Stable  Continue medication      2. Other sleep apnea  Stable  Continue use of mouth piece    3. Menopausal symptom  Stable  Continue HRT.  Patient to notify my how long she has been on this to plan on discontinuing after 5 years if able    4. Restless legs  Unstable  Information regarding RLS given in AVS  Recommended OTC magnesium nightly, 250-500 " mg    5. Anxiety state  Stable  Continue Effexor      Records reviewed  Followup: Return in about 6 months (around 5/1/2019), or if symptoms worsen or fail to improve.    I have placed the below orders and discussed them with an approved delegating provider. The MA is performing the below orders under the direction of Dr. Ocampo

## 2018-11-08 ENCOUNTER — OFFICE VISIT (OUTPATIENT)
Dept: MEDICAL GROUP | Facility: MEDICAL CENTER | Age: 58
End: 2018-11-08
Payer: COMMERCIAL

## 2018-11-08 VITALS
RESPIRATION RATE: 16 BRPM | DIASTOLIC BLOOD PRESSURE: 64 MMHG | OXYGEN SATURATION: 96 % | TEMPERATURE: 98.2 F | HEIGHT: 66 IN | WEIGHT: 168.87 LBS | SYSTOLIC BLOOD PRESSURE: 120 MMHG | BODY MASS INDEX: 27.14 KG/M2 | HEART RATE: 89 BPM

## 2018-11-08 DIAGNOSIS — G57.62 MORTON'S NEUROMA OF LEFT FOOT: ICD-10-CM

## 2018-11-08 PROCEDURE — 99213 OFFICE O/P EST LOW 20 MIN: CPT | Performed by: FAMILY MEDICINE

## 2018-11-08 ASSESSMENT — PATIENT HEALTH QUESTIONNAIRE - PHQ9: CLINICAL INTERPRETATION OF PHQ2 SCORE: 0

## 2018-11-08 NOTE — PROGRESS NOTES
CC:The encounter diagnosis was Evans's neuroma of left foot.    HISTORY OF PRESENT ILLNESS: Patient is a 58 y.o. female established patient who sees haily Mooney presents today to complain about left foot pain that is been ongoing for about 2 months.  Patient initially thought it was due to a broken piece of jewelry possibly injuring her foot but on physical exam it appears to be different.  Details below.    Health Maintenance: Patient will make an appointment for wellness exam and we will address her colonoscopy status.    Evans's neuroma of left foot  This is a new problem that is been present for approximately 2 months.  Initially the patient had a toe ring break on her second toe and so she thought this might of been associated with that because she started noting pain at the distal portion of the toe but now it has moved down into the forefoot.  She states with every step she can feel the tension and discomfort.  Upon exam she appears to have a Evans's neuroma.  She does get some numbness and tingling as well as some swelling.  I would like for her to see a podiatrist who could do the injection for us.      Patient Active Problem List    Diagnosis Date Noted   • Evans's neuroma of left foot 11/08/2018   • Anxiety state 11/01/2018   • Hypothyroidism 11/01/2018   • Menopausal symptom 11/01/2018   • Health care maintenance 11/01/2018   • Other sleep apnea 11/01/2018   • Restless legs 11/01/2018   • Family history of malignant neoplasm of breast 11/17/2017   • Endometrial stripe increased 10/23/2015      Allergies:Bloodless    Current Outpatient Prescriptions   Medication Sig Dispense Refill   • levothyroxine (SYNTHROID) 88 MCG Tab Take 88 mcg by mouth Every morning on an empty stomach.     • valacyclovir (VALTREX) 1 GM Tab valacyclovir 1 gram tablet   Take 1 tablet every 12 hours by oral route for 5 days.     • acetaminophen (TYLENOL) 325 MG Tab Take 650 mg by mouth every four hours as needed.     •  "ascorbic acid (ASCORBIC ACID) 500 MG Tab Take 500 mg by mouth every day.     • PROGESTERONE, VAGINAL, 4 % Gel Insert  in vagina.     • venlafaxine XR (EFFEXOR XR) 75 MG CAPSULE SR 24 HR Take 150 mg by mouth every day.     • levothyroxine (SYNTHROID) 75 MCG Tab Take 75 mcg by mouth Every morning on an empty stomach.     • estradiol (ESTRACE) 0.5 MG tablet Insert 0.5 mg in vagina every day.     • Estradiol-Estriol-Progesterone (BIEST/PROGESTERONE) Cream Apply  to skin as directed every day. 25 mg to wrist nightly     • ibuprofen (MOTRIN) 200 MG Tab Take 400 mg by mouth every 6 hours as needed.       No current facility-administered medications for this visit.        Social History   Substance Use Topics   • Smoking status: Never Smoker   • Smokeless tobacco: Never Used   • Alcohol use No     Social History     Social History Narrative   • No narrative on file       Family History   Problem Relation Age of Onset   • Breast Cancer Paternal Aunt    • Cancer Father    • Hyperlipidemia Father    • Hypertension Father    • Other Father         multiple aneurisms   • Thyroid Mother    • Cancer Mother         melanoma skin and eye   • Lung Disease Mother         ROS:     - Constitutional:  Negative for fever, chills, unexpected weight change, and fatigue/generalized weakness.    - Musculoskeletal: Positive for left foot pain as in HPI otherwise denies myalgias, back pain, and joint pain.         Exam:    Blood pressure 120/64, pulse 89, temperature 36.8 °C (98.2 °F), temperature source Temporal, resp. rate 16, height 1.676 m (5' 5.98\"), weight 76.6 kg (168 lb 14 oz), SpO2 96 %. Body mass index is 27.27 kg/m².    General:  Well nourished, well developed female in NAD  Head is grossly normal.  Left foot: No edema, erythema or crepitus throughout the foot.  The second toe does have Heberden's nodes at the anterior phalangeal joint.  She does have pain with palpation between the second and third foot over the area normally " occupied by the nerve most likely a Evans's neuroma.  Extremities: no clubbing, cyanosis, or edema.    Please note that this dictation was created using voice recognition software. I have made every reasonable attempt to correct obvious errors, but I expect that there are errors of grammar and possibly content that I did not discover before finalizing the note.    Assessment/Plan:  1. Evans's neuroma of left foot  Uncontrolled, this patient needs to see a podiatrist to consider injection.  She tries to be as physically active as possible.  We want her to be able to continue this.  - REFERRAL TO PODIATRY  Patient also requested to establish care.  I have asked her to change her provider through [facility] and then request a wellness visit so we can address her past medical history.

## 2018-11-08 NOTE — ASSESSMENT & PLAN NOTE
This is a new problem that is been present for approximately 2 months.  Initially the patient had a toe ring break on her second toe and so she thought this might of been associated with that because she started noting pain at the distal portion of the toe but now it has moved down into the forefoot.  She states with every step she can feel the tension and discomfort.  Upon exam she appears to have a Evans's neuroma.  She does get some numbness and tingling as well as some swelling.  I would like for her to see a podiatrist who could do the injection for us.

## 2018-11-21 ENCOUNTER — HOSPITAL ENCOUNTER (OUTPATIENT)
Dept: RADIOLOGY | Facility: MEDICAL CENTER | Age: 58
End: 2018-11-21
Attending: NURSE PRACTITIONER
Payer: COMMERCIAL

## 2018-11-21 DIAGNOSIS — Z12.31 VISIT FOR SCREENING MAMMOGRAM: ICD-10-CM

## 2018-11-21 PROCEDURE — 77067 SCR MAMMO BI INCL CAD: CPT

## 2018-11-29 ENCOUNTER — HOSPITAL ENCOUNTER (OUTPATIENT)
Dept: RADIOLOGY | Facility: MEDICAL CENTER | Age: 58
End: 2018-11-29
Attending: FAMILY MEDICINE
Payer: COMMERCIAL

## 2018-11-29 DIAGNOSIS — R92.8 ABNORMAL MAMMOGRAM: ICD-10-CM

## 2018-11-29 PROCEDURE — G0279 TOMOSYNTHESIS, MAMMO: HCPCS | Mod: RT

## 2018-11-29 PROCEDURE — 76642 ULTRASOUND BREAST LIMITED: CPT | Mod: RT

## 2018-11-30 ENCOUNTER — TELEPHONE (OUTPATIENT)
Dept: MEDICAL GROUP | Facility: MEDICAL CENTER | Age: 58
End: 2018-11-30

## 2018-11-30 DIAGNOSIS — Z12.39 SCREENING FOR BREAST CANCER: ICD-10-CM

## 2018-11-30 NOTE — TELEPHONE ENCOUNTER
VOICEMAIL  1. Caller Name: Lizy Saleem     Call Back Number: [phone] (home)       2. Message: Patient called and wanted to know if we can order genetic testing (BRAC1 and BRAC2) for breast cancer and ovarian cancer. Patient is aware that insurance wont cover. But wants to know if we can order that. Please advise    3. Patient approves office to leave a detailed voicemail/MyChart message: yes

## 2019-01-16 ENCOUNTER — TELEPHONE (OUTPATIENT)
Dept: MEDICAL GROUP | Facility: MEDICAL CENTER | Age: 59
End: 2019-01-16

## 2019-01-16 NOTE — TELEPHONE ENCOUNTER
BI -Est     COMPOUNDED MEDICATION  C-E2/E3 25mg/ml sl 25mg/ml liq  take 0.1 ml under the tongue twice a day    I am unable reconile the medication from the chart? Are you able to do it? She needs it send over to [facility].

## 2019-01-23 NOTE — TELEPHONE ENCOUNTER
I only have her cream which I sent for her.  Need to find out who was writing for this tablet?  Thank you,  Dr. CALLE

## 2019-01-24 NOTE — TELEPHONE ENCOUNTER
Medication has been fixed and have fixed it with pharmacy. They will be sending it over a script for us to put in the computer.

## 2019-02-08 ENCOUNTER — OFFICE VISIT (OUTPATIENT)
Dept: MEDICAL GROUP | Facility: MEDICAL CENTER | Age: 59
End: 2019-02-08
Payer: COMMERCIAL

## 2019-02-08 VITALS
HEART RATE: 87 BPM | BODY MASS INDEX: 26.71 KG/M2 | SYSTOLIC BLOOD PRESSURE: 110 MMHG | OXYGEN SATURATION: 94 % | DIASTOLIC BLOOD PRESSURE: 60 MMHG | WEIGHT: 166.2 LBS | HEIGHT: 66 IN | TEMPERATURE: 98.7 F

## 2019-02-08 DIAGNOSIS — G25.81 RESTLESS LEGS: ICD-10-CM

## 2019-02-08 DIAGNOSIS — F41.1 ANXIETY REACTION: ICD-10-CM

## 2019-02-08 DIAGNOSIS — G57.62 MORTON'S NEUROMA OF LEFT FOOT: ICD-10-CM

## 2019-02-08 DIAGNOSIS — Z82.49 FAMILY HISTORY OF ABDOMINAL AORTIC ANEURYSM: ICD-10-CM

## 2019-02-08 DIAGNOSIS — E03.9 ACQUIRED HYPOTHYROIDISM: ICD-10-CM

## 2019-02-08 DIAGNOSIS — B00.1 HERPES LABIALIS: ICD-10-CM

## 2019-02-08 DIAGNOSIS — G47.39 OTHER SLEEP APNEA: ICD-10-CM

## 2019-02-08 PROCEDURE — 99214 OFFICE O/P EST MOD 30 MIN: CPT | Performed by: FAMILY MEDICINE

## 2019-02-08 RX ORDER — VALACYCLOVIR HYDROCHLORIDE 1 G/1
TABLET, FILM COATED ORAL
Qty: 10 TAB | Refills: 6 | Status: SHIPPED | OUTPATIENT
Start: 2019-02-08 | End: 2019-06-12 | Stop reason: SDUPTHER

## 2019-02-08 ASSESSMENT — PATIENT HEALTH QUESTIONNAIRE - PHQ9: CLINICAL INTERPRETATION OF PHQ2 SCORE: 0

## 2019-02-08 NOTE — ASSESSMENT & PLAN NOTE
This patient has a grandmother on the paternal side who  from dissecting aorta and then her father has had an abdominal aortic aneurysm as well as an aneurysm behind each knee.  She is wondering if there is a way she could get tested to look for this.  Her father sees Dr. Blane Crabtree.  We will put in a referral and see whether or not there is a test that possibly could get paid for to screen her as well.

## 2019-02-08 NOTE — ASSESSMENT & PLAN NOTE
This continues to be a sporadic problem for which she is utilizing over-the-counter medication with fairly good results.  She does not wish to be on anything stronger at this time.

## 2019-02-08 NOTE — ASSESSMENT & PLAN NOTE
This is a chronic health problem that she is utilizing a high tech dental appliance that helps her to keep her airway open.

## 2019-02-08 NOTE — ASSESSMENT & PLAN NOTE
This is a chronic health problem for this patient which has gone on since she was a child.  She has complete resolution as long as she utilizes valacyclovir 1 g twice daily for a full 5 days.  We will continue that prescription long-term.

## 2019-02-08 NOTE — ASSESSMENT & PLAN NOTE
This is a chronic health condition for this patient where she needs to be on name brand Effexor  mg daily to help support her  who has significant depression around his health.  His health is deteriorating and that causes stress for them in their marriage.  When she is on the Effexor she is able to handle this much better.  We will continue this long-term.  Patient herself does not have any signs or symptoms of depression.

## 2019-02-08 NOTE — ASSESSMENT & PLAN NOTE
This is a chronic health problem for this patient for which she saw Dr. Boston and was able to get a shot.  She is now able to tolerate walking without discomfort.

## 2019-02-08 NOTE — ASSESSMENT & PLAN NOTE
This is a chronic health problem for this patient that is gone on for several years.  She is on levothyroxine 88 mcg.  This is worked well for her.  Her last blood work was done in 6/2018

## 2019-02-08 NOTE — PROGRESS NOTES
CC:  Diagnoses of Evans's neuroma of left foot, Anxiety reaction, Acquired hypothyroidism, Other sleep apnea, Restless legs, Herpes labialis, and Family history of abdominal aortic aneurysm were pertinent to this visit.    HISTORY OF THE PRESENT ILLNESS: Patient is a 58 y.o. female. This pleasant patient is here today to establish care previously having been a patient of KISHOR Vicente.  Patient has chronic health problems that she would like to discuss.  She has some blood work that was done for her in June 2018 that she would like to review as well.    Health Maintenance: Completed      Evans's neuroma of left foot  This is a chronic health problem for this patient for which she saw Dr. Boston and was able to get a shot.  She is now able to tolerate walking without discomfort.    Anxiety reaction  This is a chronic health condition for this patient where she needs to be on name brand Effexor  mg daily to help support her  who has significant depression around his health.  His health is deteriorating and that causes stress for them in their marriage.  When she is on the Effexor she is able to handle this much better.  We will continue this long-term.  Patient herself does not have any signs or symptoms of depression.    Acquired hypothyroidism  This is a chronic health problem for this patient that is gone on for several years.  She is on levothyroxine 88 mcg.  This is worked well for her.  Her last blood work was done in 6/2018    Other sleep apnea  This is a chronic health problem that she is utilizing a high tech dental appliance that helps her to keep her airway open.    Restless legs  This continues to be a sporadic problem for which she is utilizing over-the-counter medication with fairly good results.  She does not wish to be on anything stronger at this time.    Herpes labialis  This is a chronic health problem for this patient which has gone on since she was a child.  She has complete  resolution as long as she utilizes valacyclovir 1 g twice daily for a full 5 days.  We will continue that prescription long-term.    Family history of abdominal aortic aneurysm  This patient has a grandmother on the paternal side who  from dissecting aorta and then her father has had an abdominal aortic aneurysm as well as an aneurysm behind each knee.  She is wondering if there is a way she could get tested to look for this.  Her father sees Dr. Blane Crabtree.  We will put in a referral and see whether or not there is a test that possibly could get paid for to screen her as well.    Allergies: Bloodless    Current Outpatient Prescriptions Ordered in [facility]   Medication Sig Dispense Refill   • EFFEXOR  MG extended-release capsule Take 1 Cap by mouth every day. 90 Cap 3   • Non Formulary Request C-progesterone 60 mg/gm CR - apply 4 clicks topically daily 90 Each 3   • NON SPECIFIED C-E2/E3 25 mg/mlTake 0.1ml SL daily 5 Each 3   • valacyclovir (VALTREX) 1 GM Tab 1 tab BID for 5 days 10 Tab 6   • levothyroxine (SYNTHROID) 88 MCG Tab Take 88 mcg by mouth Every morning on an empty stomach.     • acetaminophen (TYLENOL) 325 MG Tab Take 650 mg by mouth every four hours as needed.     • ascorbic acid (ASCORBIC ACID) 500 MG Tab Take 500 mg by mouth every day.     • levothyroxine (SYNTHROID) 75 MCG Tab Take 75 mcg by mouth Every morning on an empty stomach.     • estradiol (ESTRACE) 0.5 MG tablet Insert 0.5 mg in vagina every day.     • ibuprofen (MOTRIN) 200 MG Tab Take 400 mg by mouth every 6 hours as needed.       No current Epic-ordered facility-administered medications on file.        Past Medical History:   Diagnosis Date   • Acquired hypothyroidism 2018   • Anesthesia     PONV   • Anxiety reaction 2018   • Family history of abdominal aortic aneurysm 2019   • Herpes labialis 2019   • Evans's neuroma of left foot 2018       Past Surgical History:   Procedure Laterality Date   •  HYSTEROSCOPY WITH VIDEO DIAGNOSTIC  10/23/2015    Procedure: HYSTEROSCOPY WITH VIDEO DIAGNOSTIC;  Surgeon: Lesli Frias M.D.;  Location: SURGERY [facility];  Service:    • DILATION AND CURETTAGE  10/23/2015    Procedure: DILATION AND CURETTAGE;  Surgeon: Lesli Frias M.D.;  Location: SURGERY [facility];  Service:    • BUNIONECTOMY Right 2003   • BUNIONECTOMY Left 2000   • ARTHROSCOPY, KNEE Left 1992       Social History   Substance Use Topics   • Smoking status: Never Smoker   • Smokeless tobacco: Never Used   • Alcohol use No       Social History     Social History Narrative   • No narrative on file       Family History   Problem Relation Age of Onset   • Breast Cancer Paternal Aunt    • Cancer Father    • Hyperlipidemia Father    • Hypertension Father    • Other Father         multiple aneurisms   • Thyroid Mother    • Cancer Mother         melanoma skin and eye   • Lung Disease Mother        ROS:     - Constitutional: Negative for fever, chills, unexpected weight change, and fatigue/generalized weakness.     - HEENT: Negative for headaches, vision changes, hearing changes, ear pain, ear discharge, rhinorrhea, sinus congestion, sore throat, and neck pain.      - Respiratory: Negative for cough, sputum production, chest congestion, dyspnea, wheezing, and crackles.      - Cardiovascular: Negative for chest pain, palpitations, orthopnea, and bilateral lower extremity edema.     - Gastrointestinal: Negative for heartburn, nausea, vomiting, abdominal pain, hematochezia, melena, diarrhea, constipation, and greasy/foul-smelling stools.     - Genitourinary: Negative for dysuria, polyuria, hematuria, pyuria, urinary urgency, and urinary incontinence.     - Musculoskeletal: Negative for myalgias, back pain, and joint pain.     - Skin: Negative for rash, itching, cyanotic skin color change.     - Neurological: Negative for dizziness, tingling, tremors, focal sensory deficit, focal weakness and  "headaches.     - Endo/Heme/Allergies: Does not bruise/bleed easily.     - Psychiatric/Behavioral: Positive for anxiety but denies depression, suicidal/homicidal ideation and memory loss.        - NOTE: All other systems reviewed and are negative, except as in HPI.      .      Exam: Blood pressure 110/60, pulse 87, temperature 37.1 °C (98.7 °F), height 1.676 m (5' 5.98\"), weight 75.4 kg (166 lb 3.2 oz), SpO2 94 %. Body mass index is 26.84 kg/m².    General: Normal appearing. No distress.    Patient was seen for 25 minutes face to face of which, 20 minutes was spent counseling regarding review of previous labs from 6/2018.  Also discussion of current chronic health problems and appropriate medications to help with these.  Plan will be to see this patient back approximately July 2019 get her Pap done and blood work reviewed..    Please note that this dictation was created using voice recognition software. I have made every reasonable attempt to correct obvious errors, but I expect that there are errors of grammar and possibly content that I did not discover before finalizing the note.      Assessment/Plan  1. Nathan's neuroma of left foot  Stable and well-controlled    2. Anxiety reaction  Stable and well-controlled with current use of Effexor X are 150 mg daily.  Patient informs me she has to have name brand medication.  She cannot tolerate generics it does not work as well for her.    3. Acquired hypothyroidism  Controlled, continue with current meds and lifestyle.      4. Other sleep apnea  Patient has been able to control her sleep apnea with use of a dental device.  She will continue to follow with her dentist.    5. Restless legs  Patient utilizes over-the-counter medications to help with restless legs this is working well at this time    6. Herpes labialis  Adequately controlled when she has an outbreak she will use Valtrex.    Since provided    7. Family history of abdominal aortic aneurysm  Patient requesting " referral to Dr. Blane Crabtree who sees her father and knows their history of aneurysms.  - REFERRAL TO VASCULAR SURGERY

## 2019-02-08 NOTE — LETTER
[facility]  Rosalie Koehler M.D.  [address]  [place] [address]  Fax: [phone]   Authorization for Release/Disclosure of   Protected Health Information   Name: ELADIO RIOS : 1960 SSN: [id number]   Address: [address] [place] [address]  [place] [address] Phone:    [phone] (home)    I authorize the entity listed below to release/disclose the PHI below to:   [employer] [facility]/Rosalie Koehler M.D. and Rosalie Koehler M.D.   Provider or Entity Name:  [facility]   Address   [facility], [facility], [place]            [address] [address] [facility] [address], [place] [address] Phone:  [phone]      Fax:       [phone]          Reason for request: continuity of care   Information to be released:    [ X ] LAST COLONOSCOPY,  including any PATH REPORT and follow-up  [ X ] LAST FIT/COLOGUARD RESULT [  ] LAST DEXA  [  ] LAST MAMMOGRAM  [  ] LAST PAP  [  ] LAST LABS [  ] RETINA EXAM REPORT  [  ] IMMUNIZATION RECORDS  [  ] Release all info      [  ] Check here and initial the line next to each item to release ALL health information INCLUDING  _____ Care and treatment for drug and / or alcohol abuse  _____ HIV testing, infection status, or AIDS  _____ Genetic Testing    DATES OF SERVICE OR TIME PERIOD TO BE DISCLOSED: _____________  I understand and acknowledge that:  * This Authorization may be revoked at any time by you in writing, except if your health information has already been used or disclosed.  * Your health information that will be used or disclosed as a result of you signing this authorization could be re-disclosed by the recipient. If this occurs, your re-disclosed health information may no longer be protected by State or Federal laws.  * You may refuse to sign this Authorization. Your refusal will not affect your ability to obtain treatment.  * This Authorization becomes effective upon signing and will  on (date) __________.      If no date is indicated, this Authorization will  one (1)  year from the signature date.    Name: Lizy Wrightlesli    Signature:   Date:     2/8/2019       PLEASE FAX REQUESTED RECORDS BACK TO: [phone]

## 2019-02-20 ENCOUNTER — APPOINTMENT (OUTPATIENT)
Dept: RADIOLOGY | Facility: MEDICAL CENTER | Age: 59
End: 2019-02-20
Attending: PHYSICIAN ASSISTANT
Payer: COMMERCIAL

## 2019-03-03 ENCOUNTER — HOSPITAL ENCOUNTER (OUTPATIENT)
Dept: RADIOLOGY | Facility: MEDICAL CENTER | Age: 59
End: 2019-03-03
Attending: PHYSICIAN ASSISTANT
Payer: COMMERCIAL

## 2019-03-03 DIAGNOSIS — M75.31 CALCIFIC TENDONITIS OF RIGHT SHOULDER: ICD-10-CM

## 2019-03-03 DIAGNOSIS — M25.511 ACUTE PAIN OF RIGHT SHOULDER: ICD-10-CM

## 2019-03-03 DIAGNOSIS — M75.21 BICEPS TENDINITIS OF RIGHT SHOULDER: ICD-10-CM

## 2019-03-03 PROCEDURE — 73221 MRI JOINT UPR EXTREM W/O DYE: CPT | Mod: RT

## 2019-06-12 RX ORDER — VALACYCLOVIR HYDROCHLORIDE 1 G/1
TABLET, FILM COATED ORAL
Qty: 10 TAB | Refills: 6 | Status: SHIPPED | OUTPATIENT
Start: 2019-06-12 | End: 2019-07-17 | Stop reason: SDUPTHER

## 2019-06-12 NOTE — TELEPHONE ENCOUNTER
Was the patient seen in the last year in this department? Yes    Does patient have an active prescription for medications requested? No     Received Request Via: Patient     Talk to patient about me sending medication over.

## 2019-07-17 ENCOUNTER — HOSPITAL ENCOUNTER (OUTPATIENT)
Facility: MEDICAL CENTER | Age: 59
End: 2019-07-17
Attending: FAMILY MEDICINE
Payer: COMMERCIAL

## 2019-07-17 ENCOUNTER — OFFICE VISIT (OUTPATIENT)
Dept: MEDICAL GROUP | Facility: MEDICAL CENTER | Age: 59
End: 2019-07-17
Payer: COMMERCIAL

## 2019-07-17 VITALS
SYSTOLIC BLOOD PRESSURE: 120 MMHG | WEIGHT: 170.86 LBS | BODY MASS INDEX: 27.46 KG/M2 | HEIGHT: 66 IN | RESPIRATION RATE: 14 BRPM | DIASTOLIC BLOOD PRESSURE: 70 MMHG | OXYGEN SATURATION: 96 % | TEMPERATURE: 97.3 F | HEART RATE: 81 BPM

## 2019-07-17 DIAGNOSIS — N95.1 MENOPAUSAL SYMPTOM: ICD-10-CM

## 2019-07-17 DIAGNOSIS — G25.81 RESTLESS LEGS: ICD-10-CM

## 2019-07-17 DIAGNOSIS — E03.9 ACQUIRED HYPOTHYROIDISM: ICD-10-CM

## 2019-07-17 DIAGNOSIS — Z01.419 WELL WOMAN EXAM WITH ROUTINE GYNECOLOGICAL EXAM: ICD-10-CM

## 2019-07-17 DIAGNOSIS — B00.1 HERPES LABIALIS: ICD-10-CM

## 2019-07-17 PROCEDURE — 87624 HPV HI-RISK TYP POOLED RSLT: CPT

## 2019-07-17 PROCEDURE — 99396 PREV VISIT EST AGE 40-64: CPT | Performed by: FAMILY MEDICINE

## 2019-07-17 PROCEDURE — 88175 CYTOPATH C/V AUTO FLUID REDO: CPT

## 2019-07-17 PROCEDURE — 99214 OFFICE O/P EST MOD 30 MIN: CPT | Mod: 25 | Performed by: FAMILY MEDICINE

## 2019-07-17 RX ORDER — PRAMIPEXOLE DIHYDROCHLORIDE 0.5 MG/1
0.5 TABLET ORAL
Qty: 90 TAB | Refills: 3 | Status: SHIPPED | OUTPATIENT
Start: 2019-07-17 | End: 2020-10-27

## 2019-07-17 RX ORDER — VALACYCLOVIR HYDROCHLORIDE 1 G/1
TABLET, FILM COATED ORAL
Qty: 90 TAB | Refills: 3 | Status: SHIPPED | OUTPATIENT
Start: 2019-07-17 | End: 2020-06-19 | Stop reason: SDUPTHER

## 2019-07-17 NOTE — ASSESSMENT & PLAN NOTE
This is a chronic health problem for this patient that has gotten much worse over the last 6 months.  She finds that she is having more and more outbreaks.  She has been using the Valtrex 500 mg twice daily when she gets an outbreak but it does not always make it get better as quickly as it has in the past.  Were going to put her on suppression and have her take it on a daily basis and see if we can suppress this over the coming 6 months to a year.

## 2019-07-17 NOTE — ASSESSMENT & PLAN NOTE
This is a chronic health problem for this patient for which she is on levothyroxine 88 mcg daily.  She needs to do blood work before we can refill her levothyroxine at that dose just to be certain that its adequate and keeping her well supplied with thyroid replacement.

## 2019-07-17 NOTE — ASSESSMENT & PLAN NOTE
This is a chronic health problem for this patient that has gotten worse recently.  She was using an over-the-counter medication with really good relief of her restless legs but now it seems like she still has nights where she is not able to get adequate sleep because her legs have to keep moving.  Were going to try her on Mirapex 0.5 mg at bedtime.  She will try this and see if she can get things to calm down and then she could go back to her over-the-counter medication if she is going through a good time because these do seem to come in waves of attacks.

## 2019-07-17 NOTE — ASSESSMENT & PLAN NOTE
This patient is postmenopausal did have an abnormal Pap in 1991 that they did colposcopy and LEEP.  She has been normal since that time.  If this Pap is normal she could go out 5 years.  We will check HPV as well.

## 2019-07-17 NOTE — ASSESSMENT & PLAN NOTE
This is a chronic health problem for this patient for which she does use bioidentical hormones.  She is going to talk to the [facility] pharmacy and make certain that they have the prescriptions that were written for her last time.

## 2019-07-17 NOTE — PROGRESS NOTES
S:  Lizy Saleem is a 58 y.o.,femalewho presents today for her Pap and GYN exam.  .  She has been menopausal since age:56.  She has  utilized HRT.  Currently taking: Bioidentical hormones    Well woman exam with routine gynecological exam  This patient is postmenopausal did have an abnormal Pap in 1991 that they did colposcopy and LEEP.  She has been normal since that time.  If this Pap is normal she could go out 5 years.  We will check HPV as well.    Acquired hypothyroidism  This is a chronic health problem for this patient for which she is on levothyroxine 88 mcg daily.  She needs to do blood work before we can refill her levothyroxine at that dose just to be certain that its adequate and keeping her well supplied with thyroid replacement.    Restless legs  This is a chronic health problem for this patient that has gotten worse recently.  She was using an over-the-counter medication with really good relief of her restless legs but now it seems like she still has nights where she is not able to get adequate sleep because her legs have to keep moving.  Were going to try her on Mirapex 0.5 mg at bedtime.  She will try this and see if she can get things to calm down and then she could go back to her over-the-counter medication if she is going through a good time because these do seem to come in waves of attacks.    Herpes labialis  This is a chronic health problem for this patient that has gotten much worse over the last 6 months.  She finds that she is having more and more outbreaks.  She has been using the Valtrex 500 mg twice daily when she gets an outbreak but it does not always make it get better as quickly as it has in the past.  Were going to put her on suppression and have her take it on a daily basis and see if we can suppress this over the coming 6 months to a year.    Menopausal symptom  This is a chronic health problem for this patient for which she does use bioidentical hormones.  She is  going to talk to the [facility] pharmacy and make certain that they have the prescriptions that were written for her last time.      She is , P:0.    She has had an Abnormal Pap previously.  Her last Mammogram was done: 2018: Normal.  Her preventative health screens are up to date.    GYN ROS: no abnormal bleeding, pelvic pain or discharge, no breast pain or new or enlarging lumps on self exam    Patient Active Problem List    Diagnosis Date Noted   • Well woman exam with routine gynecological exam 2019   • Herpes labialis 2019   • Family history of abdominal aortic aneurysm 2019   • Evans's neuroma of left foot 2018   • Anxiety reaction 2018   • Acquired hypothyroidism 2018   • Menopausal symptom 2018   • Health care maintenance 2018   • Other sleep apnea 2018   • Restless legs 2018   • Family history of malignant neoplasm of breast 2017     Current Outpatient Prescriptions on File Prior to Visit   Medication Sig Dispense Refill   • levothyroxine (SYNTHROID) 88 MCG Tab Take 1 Tab by mouth Every morning on an empty stomach. 90 Tab 2   • EFFEXOR  MG extended-release capsule Take 1 Cap by mouth every day. 90 Cap 3   • Non Formulary Request C-progesterone 60 mg/gm CR - apply 4 clicks topically daily 90 Each 3   • NON SPECIFIED C-E2/E3 25 mg/mlTake 0.1ml SL daily 5 Each 3   • acetaminophen (TYLENOL) 325 MG Tab Take 650 mg by mouth every four hours as needed.     • ascorbic acid (ASCORBIC ACID) 500 MG Tab Take 500 mg by mouth every day.     • estradiol (ESTRACE) 0.5 MG tablet Insert 0.5 mg in vagina every day.     • ibuprofen (MOTRIN) 200 MG Tab Take 400 mg by mouth every 6 hours as needed.       No current facility-administered medications on file prior to visit.      Social History   Substance Use Topics   • Smoking status: Never Smoker   • Smokeless tobacco: Never Used   • Alcohol use No       O: /70 (BP Location: Left arm,  "Patient Position: Sitting, BP Cuff Size: Adult)   Pulse 81   Temp 36.3 °C (97.3 °F) (Temporal)   Resp 14   Ht 1.676 m (5' 5.98\")   Wt 77.5 kg (170 lb 13.7 oz)   SpO2 96%   BMI 27.59 kg/m²   Vitals Noted and Reviewed  Breasts: breasts symmetric, no dominant or suspicious mass, no skin or nipple changes and no axillary adenopathy  Vulva: grossly unremarkable, no lesions or masses noted  Vagina: Poorly rugated and light in color but no abnormal discharge.  Cervix: Nonparous, nonfriable, no surface lesions identified, Pap was performed  Uterus: Normal shape, position and consistency  Bimanual exam: No uteromegaly, negative chandelier sign, adnexa freely movable and without enlargements bilaterally  Rectal: not performed    A:  NormalGYN Exam  Restless leg syndrome: Uncontrolled  Hypothyroidism: Unknown control needing new lab work  Herpes labialis: Uncontrolled    P:    pap smear done today and we will notify the patient of this result in the coming 3 days via [employer].  For the restless legs were going to try Mirapex 0.5 mg at bedtime.  We will see her back in 6 weeks to make certain this is adequate to control her symptomatology.  For her hypothyroidism she is already on levothyroxine, we will do a TSH T4 and a T3 to make certain all of that is normal.  For her herpes labialis were going to try Valtrex 1 g daily to see if we can suppress this completely.    Pap was processed and sent to the lab    Recommend follow up in 6 weeks  "

## 2019-07-18 DIAGNOSIS — Z01.419 WELL WOMAN EXAM WITH ROUTINE GYNECOLOGICAL EXAM: ICD-10-CM

## 2019-07-19 ENCOUNTER — HOSPITAL ENCOUNTER (OUTPATIENT)
Dept: LAB | Facility: MEDICAL CENTER | Age: 59
End: 2019-07-19
Attending: FAMILY MEDICINE
Payer: COMMERCIAL

## 2019-07-19 DIAGNOSIS — E03.9 ACQUIRED HYPOTHYROIDISM: ICD-10-CM

## 2019-07-19 DIAGNOSIS — Z01.419 WELL WOMAN EXAM WITH ROUTINE GYNECOLOGICAL EXAM: ICD-10-CM

## 2019-07-19 LAB
ALBUMIN SERPL BCP-MCNC: 4 G/DL (ref 3.2–4.9)
ALBUMIN/GLOB SERPL: 1.5 G/DL
ALP SERPL-CCNC: 48 U/L (ref 30–99)
ALT SERPL-CCNC: 15 U/L (ref 2–50)
ANION GAP SERPL CALC-SCNC: 7 MMOL/L (ref 0–11.9)
AST SERPL-CCNC: 20 U/L (ref 12–45)
BASOPHILS # BLD AUTO: 0.9 % (ref 0–1.8)
BASOPHILS # BLD: 0.05 K/UL (ref 0–0.12)
BILIRUB SERPL-MCNC: 0.4 MG/DL (ref 0.1–1.5)
BUN SERPL-MCNC: 17 MG/DL (ref 8–22)
CALCIUM SERPL-MCNC: 9.1 MG/DL (ref 8.5–10.5)
CHLORIDE SERPL-SCNC: 106 MMOL/L (ref 96–112)
CHOLEST SERPL-MCNC: 135 MG/DL (ref 100–199)
CO2 SERPL-SCNC: 26 MMOL/L (ref 20–33)
CREAT SERPL-MCNC: 0.76 MG/DL (ref 0.5–1.4)
CYTOLOGY REG CYTOL: NORMAL
EOSINOPHIL # BLD AUTO: 0.12 K/UL (ref 0–0.51)
EOSINOPHIL NFR BLD: 2.2 % (ref 0–6.9)
ERYTHROCYTE [DISTWIDTH] IN BLOOD BY AUTOMATED COUNT: 47.4 FL (ref 35.9–50)
FASTING STATUS PATIENT QL REPORTED: NORMAL
GLOBULIN SER CALC-MCNC: 2.6 G/DL (ref 1.9–3.5)
GLUCOSE SERPL-MCNC: 83 MG/DL (ref 65–99)
HCT VFR BLD AUTO: 42.8 % (ref 37–47)
HDLC SERPL-MCNC: 78 MG/DL
HGB BLD-MCNC: 13.8 G/DL (ref 12–16)
HPV HR 12 DNA CVX QL NAA+PROBE: NEGATIVE
HPV16 DNA SPEC QL NAA+PROBE: NEGATIVE
HPV18 DNA SPEC QL NAA+PROBE: NEGATIVE
IMM GRANULOCYTES # BLD AUTO: 0.01 K/UL (ref 0–0.11)
IMM GRANULOCYTES NFR BLD AUTO: 0.2 % (ref 0–0.9)
LDLC SERPL CALC-MCNC: 50 MG/DL
LYMPHOCYTES # BLD AUTO: 1.37 K/UL (ref 1–4.8)
LYMPHOCYTES NFR BLD: 25.4 % (ref 22–41)
MCH RBC QN AUTO: 30.7 PG (ref 27–33)
MCHC RBC AUTO-ENTMCNC: 32.2 G/DL (ref 33.6–35)
MCV RBC AUTO: 95.3 FL (ref 81.4–97.8)
MONOCYTES # BLD AUTO: 0.37 K/UL (ref 0–0.85)
MONOCYTES NFR BLD AUTO: 6.9 % (ref 0–13.4)
NEUTROPHILS # BLD AUTO: 3.47 K/UL (ref 2–7.15)
NEUTROPHILS NFR BLD: 64.4 % (ref 44–72)
NRBC # BLD AUTO: 0 K/UL
NRBC BLD-RTO: 0 /100 WBC
PLATELET # BLD AUTO: 229 K/UL (ref 164–446)
PMV BLD AUTO: 12.5 FL (ref 9–12.9)
POTASSIUM SERPL-SCNC: 4 MMOL/L (ref 3.6–5.5)
PROT SERPL-MCNC: 6.6 G/DL (ref 6–8.2)
RBC # BLD AUTO: 4.49 M/UL (ref 4.2–5.4)
SODIUM SERPL-SCNC: 139 MMOL/L (ref 135–145)
SPECIMEN SOURCE: NORMAL
T3 SERPL-MCNC: 72.6 NG/DL (ref 60–181)
T4 FREE SERPL-MCNC: 0.9 NG/DL (ref 0.53–1.43)
TRIGL SERPL-MCNC: 34 MG/DL (ref 0–149)
TSH SERPL DL<=0.005 MIU/L-ACNC: 1.32 UIU/ML (ref 0.38–5.33)
WBC # BLD AUTO: 5.4 K/UL (ref 4.8–10.8)

## 2019-07-19 PROCEDURE — 84480 ASSAY TRIIODOTHYRONINE (T3): CPT

## 2019-07-19 PROCEDURE — 84439 ASSAY OF FREE THYROXINE: CPT

## 2019-07-19 PROCEDURE — 80061 LIPID PANEL: CPT

## 2019-07-19 PROCEDURE — 36415 COLL VENOUS BLD VENIPUNCTURE: CPT

## 2019-07-19 PROCEDURE — 85025 COMPLETE CBC W/AUTO DIFF WBC: CPT

## 2019-07-19 PROCEDURE — 80053 COMPREHEN METABOLIC PANEL: CPT

## 2019-07-19 PROCEDURE — 84443 ASSAY THYROID STIM HORMONE: CPT

## 2019-07-30 RX ORDER — FLUCONAZOLE 150 MG/1
150 TABLET ORAL
Qty: 2 TAB | Refills: 0 | Status: SHIPPED | OUTPATIENT
Start: 2019-07-30 | End: 2020-10-27

## 2019-08-13 ENCOUNTER — TELEPHONE (OUTPATIENT)
Dept: MEDICAL GROUP | Facility: MEDICAL CENTER | Age: 59
End: 2019-08-13

## 2019-09-16 ENCOUNTER — APPOINTMENT (RX ONLY)
Dept: URBAN - METROPOLITAN AREA CLINIC 4 | Facility: CLINIC | Age: 59
Setting detail: DERMATOLOGY
End: 2019-09-16

## 2019-09-16 DIAGNOSIS — L82.1 OTHER SEBORRHEIC KERATOSIS: ICD-10-CM

## 2019-09-16 DIAGNOSIS — D18.0 HEMANGIOMA: ICD-10-CM

## 2019-09-16 DIAGNOSIS — D22 MELANOCYTIC NEVI: ICD-10-CM

## 2019-09-16 DIAGNOSIS — L82.0 INFLAMED SEBORRHEIC KERATOSIS: ICD-10-CM

## 2019-09-16 DIAGNOSIS — L81.4 OTHER MELANIN HYPERPIGMENTATION: ICD-10-CM

## 2019-09-16 PROBLEM — D18.01 HEMANGIOMA OF SKIN AND SUBCUTANEOUS TISSUE: Status: ACTIVE | Noted: 2019-09-16

## 2019-09-16 PROBLEM — D22.62 MELANOCYTIC NEVI OF LEFT UPPER LIMB, INCLUDING SHOULDER: Status: ACTIVE | Noted: 2019-09-16

## 2019-09-16 PROBLEM — D22.5 MELANOCYTIC NEVI OF TRUNK: Status: ACTIVE | Noted: 2019-09-16

## 2019-09-16 PROBLEM — D22.61 MELANOCYTIC NEVI OF RIGHT UPPER LIMB, INCLUDING SHOULDER: Status: ACTIVE | Noted: 2019-09-16

## 2019-09-16 PROBLEM — D22.71 MELANOCYTIC NEVI OF RIGHT LOWER LIMB, INCLUDING HIP: Status: ACTIVE | Noted: 2019-09-16

## 2019-09-16 PROBLEM — D22.72 MELANOCYTIC NEVI OF LEFT LOWER LIMB, INCLUDING HIP: Status: ACTIVE | Noted: 2019-09-16

## 2019-09-16 PROCEDURE — ? LIQUID NITROGEN

## 2019-09-16 PROCEDURE — ? COUNSELING

## 2019-09-16 PROCEDURE — 99213 OFFICE O/P EST LOW 20 MIN: CPT | Mod: 25

## 2019-09-16 PROCEDURE — 17110 DESTRUCTION B9 LES UP TO 14: CPT

## 2019-09-16 PROCEDURE — ? SUNSCREEN RECOMMENDATIONS

## 2019-09-16 ASSESSMENT — LOCATION DETAILED DESCRIPTION DERM
LOCATION DETAILED: LEFT INFERIOR ANTERIOR NECK
LOCATION DETAILED: LEFT PROXIMAL POSTERIOR UPPER ARM
LOCATION DETAILED: LEFT PROXIMAL DORSAL FOREARM
LOCATION DETAILED: RIGHT DISTAL POSTERIOR UPPER ARM
LOCATION DETAILED: SUPERIOR MID FOREHEAD
LOCATION DETAILED: INFERIOR THORACIC SPINE
LOCATION DETAILED: RIGHT INFERIOR FOREHEAD
LOCATION DETAILED: LEFT SUPERIOR FOREHEAD
LOCATION DETAILED: RIGHT LATERAL EYEBROW
LOCATION DETAILED: RIGHT RADIAL DORSAL HAND
LOCATION DETAILED: RIGHT PROXIMAL DORSAL FOREARM
LOCATION DETAILED: LEFT ANTERIOR PROXIMAL THIGH
LOCATION DETAILED: PERIUMBILICAL SKIN
LOCATION DETAILED: EPIGASTRIC SKIN
LOCATION DETAILED: RIGHT RIB CAGE
LOCATION DETAILED: LEFT LATERAL BUCCAL CHEEK
LOCATION DETAILED: SUPERIOR THORACIC SPINE
LOCATION DETAILED: RIGHT SUPERIOR FOREHEAD
LOCATION DETAILED: RIGHT INFERIOR LATERAL FOREHEAD
LOCATION DETAILED: RIGHT CENTRAL MALAR CHEEK
LOCATION DETAILED: LEFT CENTRAL MALAR CHEEK
LOCATION DETAILED: RIGHT INFERIOR MEDIAL FOREHEAD
LOCATION DETAILED: UPPER STERNUM
LOCATION DETAILED: RIGHT SUPERIOR MEDIAL MIDBACK
LOCATION DETAILED: LEFT ULNAR DORSAL HAND
LOCATION DETAILED: LEFT SUPERIOR MEDIAL UPPER BACK
LOCATION DETAILED: RIGHT ANTERIOR PROXIMAL THIGH

## 2019-09-16 ASSESSMENT — LOCATION SIMPLE DESCRIPTION DERM
LOCATION SIMPLE: LEFT FOREARM
LOCATION SIMPLE: ABDOMEN
LOCATION SIMPLE: LEFT FOREHEAD
LOCATION SIMPLE: RIGHT LOWER BACK
LOCATION SIMPLE: LEFT THIGH
LOCATION SIMPLE: CHEST
LOCATION SIMPLE: RIGHT THIGH
LOCATION SIMPLE: UPPER BACK
LOCATION SIMPLE: LEFT POSTERIOR UPPER ARM
LOCATION SIMPLE: LEFT UPPER BACK
LOCATION SIMPLE: RIGHT FOREARM
LOCATION SIMPLE: LEFT CHEEK
LOCATION SIMPLE: UPPER BACK
LOCATION SIMPLE: SUPERIOR FOREHEAD
LOCATION SIMPLE: LEFT ANTERIOR NECK
LOCATION SIMPLE: LEFT HAND
LOCATION SIMPLE: RIGHT CHEEK
LOCATION SIMPLE: RIGHT POSTERIOR UPPER ARM
LOCATION SIMPLE: RIGHT FOREHEAD
LOCATION SIMPLE: RIGHT HAND
LOCATION SIMPLE: RIGHT EYEBROW

## 2019-09-16 ASSESSMENT — LOCATION ZONE DERM
LOCATION ZONE: HAND
LOCATION ZONE: ARM
LOCATION ZONE: FACE
LOCATION ZONE: NECK
LOCATION ZONE: TRUNK
LOCATION ZONE: LEG
LOCATION ZONE: TRUNK

## 2019-09-16 NOTE — PROCEDURE: LIQUID NITROGEN
Include Z78.9 (Other Specified Conditions Influencing Health Status) As An Associated Diagnosis?: No
Medical Necessity Clause: This procedure was medically necessary because the lesions that were treated were: inflamed and itchy and very bothersome
Post-Care Instructions: I reviewed with the patient in detail post-care instructions. Patient is to wear sunprotection, and avoid picking at any of the treated lesions. Pt may apply Vaseline to crusted or scabbing areas.
Medical Necessity Information: It is in your best interest to select a reason for this procedure from the list below. All of these items fulfill various CMS LCD requirements except the new and changing color options.
Consent: The patient's consent was obtained including but not limited to risks of crusting, scabbing, blistering, scarring, darker or lighter pigmentary change, recurrence, incomplete removal and infection.
Detail Level: Detailed

## 2019-10-14 ENCOUNTER — TELEPHONE (OUTPATIENT)
Dept: MEDICAL GROUP | Facility: MEDICAL CENTER | Age: 59
End: 2019-10-14

## 2019-10-14 NOTE — TELEPHONE ENCOUNTER
"· [facility] signature.     · All appropriate fields completed by Medical Assistant: Yes    · Paperwork placed in \"MA to Provider\" folder/basket. Awaiting provider completion/signature.    "

## 2019-12-02 ENCOUNTER — HOSPITAL ENCOUNTER (OUTPATIENT)
Dept: RADIOLOGY | Facility: MEDICAL CENTER | Age: 59
End: 2019-12-02
Attending: FAMILY MEDICINE
Payer: COMMERCIAL

## 2019-12-02 DIAGNOSIS — Z12.31 VISIT FOR SCREENING MAMMOGRAM: ICD-10-CM

## 2019-12-02 PROCEDURE — 77063 BREAST TOMOSYNTHESIS BI: CPT

## 2020-04-22 ENCOUNTER — PATIENT MESSAGE (OUTPATIENT)
Dept: MEDICAL GROUP | Facility: PHYSICIAN GROUP | Age: 60
End: 2020-04-22

## 2020-04-22 NOTE — PATIENT COMMUNICATION
Phone Number Called: [phone] (home)      Call outcome: Spoke to patient regarding message below.    Message: I have spoken with patient and made her aware that we do not do any COVID testing here in our office. She is aware and will get in contact with [facility].

## 2020-05-06 RX ORDER — LEVOTHYROXINE SODIUM 88 UG/1
88 TABLET ORAL
Qty: 90 TAB | Refills: 2 | Status: SHIPPED | OUTPATIENT
Start: 2020-05-06 | End: 2021-01-11

## 2020-05-18 ENCOUNTER — PATIENT MESSAGE (OUTPATIENT)
Dept: MEDICAL GROUP | Facility: PHYSICIAN GROUP | Age: 60
End: 2020-05-18

## 2020-05-20 ENCOUNTER — TELEPHONE (OUTPATIENT)
Dept: MEDICAL GROUP | Facility: PHYSICIAN GROUP | Age: 60
End: 2020-05-20

## 2020-05-20 NOTE — TELEPHONE ENCOUNTER
----- Message from Rosalie Koehler M.D. sent at 5/19/2020 11:55 AM PDT -----  Regarding: FW: Prescription Question  Contact: [phone]  Please call and give her a year's refill  Thank you,  Dr. CALLE  ----- Message -----  From: Lizy Saleem  Sent: 5/19/2020  10:33 AM PDT  To: Rosalie Koehler M.D.  Subject: Prescription Question                            Thank you but for some reason [facility] has not received it.  They asked if you could please call them at  [phone].  I'll be running out by the weekend.  Thank you very much!!! Lizy

## 2020-06-19 RX ORDER — VALACYCLOVIR HYDROCHLORIDE 1 G/1
TABLET, FILM COATED ORAL
Qty: 90 TAB | Refills: 3 | Status: SHIPPED | OUTPATIENT
Start: 2020-06-19 | End: 2021-01-12 | Stop reason: SDUPTHER

## 2020-07-10 ENCOUNTER — TELEPHONE (OUTPATIENT)
Dept: MEDICAL GROUP | Facility: PHYSICIAN GROUP | Age: 60
End: 2020-07-10

## 2020-07-10 DIAGNOSIS — E03.9 ACQUIRED HYPOTHYROIDISM: ICD-10-CM

## 2020-07-10 DIAGNOSIS — Z00.00 HEALTH CARE MAINTENANCE: ICD-10-CM

## 2020-07-10 NOTE — TELEPHONE ENCOUNTER
Phone Number Called: [phone] (home)      Call outcome: Left detailed message for patient. Informed to call back with any additional questions.    Message: Left message for patient that lab work has been ordered up for her and to call us back if she has any questions.

## 2020-07-10 NOTE — TELEPHONE ENCOUNTER
1. Caller Name: Lizy Saleem                        Call Back Number: [phone] (home)      2. Message: Patient called and wanted us to order up her yearly blood work. Please include her vitamin D in that blood work for her. I will call her once this has been ordered and she will go down and get it done. She will also schedule an appointment to see us, as it has been about a year since she has seen us a as well.     Thank you.

## 2020-07-13 ENCOUNTER — TELEPHONE (OUTPATIENT)
Dept: MEDICAL GROUP | Facility: PHYSICIAN GROUP | Age: 60
End: 2020-07-13

## 2020-07-13 NOTE — TELEPHONE ENCOUNTER
1. Caller Name: Lizy Saleem                        Call Back Number: [phone] (home)      2. Message: Patient called and asked if we could ordered up blood work for her to see if she has sign of breast cancer, as family members have  of breast cancer and she would like to lab work done to see if she has any sign of it.     She also stated if she could get hormone testing done as she is on hormones?     Please let me know.    Thank you.

## 2020-07-15 NOTE — TELEPHONE ENCOUNTER
This patient needs to come in and discuss her family history and exactly what she is asking for.  I am not familiar with lab tests that look for breast cancer unless she is talking about genetic testing and that she would have to get approved by her insurance.  Would be best if she comes in to discuss this.  Thank you,  Dr. CALLE

## 2020-07-22 ENCOUNTER — PATIENT MESSAGE (OUTPATIENT)
Dept: MEDICAL GROUP | Facility: PHYSICIAN GROUP | Age: 60
End: 2020-07-22

## 2020-07-22 ENCOUNTER — HOSPITAL ENCOUNTER (OUTPATIENT)
Dept: LAB | Facility: MEDICAL CENTER | Age: 60
End: 2020-07-22
Attending: FAMILY MEDICINE
Payer: COMMERCIAL

## 2020-07-22 DIAGNOSIS — E03.9 ACQUIRED HYPOTHYROIDISM: ICD-10-CM

## 2020-07-22 DIAGNOSIS — Z00.00 HEALTH CARE MAINTENANCE: ICD-10-CM

## 2020-07-22 LAB
25(OH)D3 SERPL-MCNC: 88 NG/ML (ref 30–100)
ALBUMIN SERPL BCP-MCNC: 4.2 G/DL (ref 3.2–4.9)
ALBUMIN/GLOB SERPL: 1.8 G/DL
ALP SERPL-CCNC: 52 U/L (ref 30–99)
ALT SERPL-CCNC: 16 U/L (ref 2–50)
ANION GAP SERPL CALC-SCNC: 11 MMOL/L (ref 7–16)
AST SERPL-CCNC: 18 U/L (ref 12–45)
BILIRUB SERPL-MCNC: 0.4 MG/DL (ref 0.1–1.5)
BUN SERPL-MCNC: 20 MG/DL (ref 8–22)
CALCIUM SERPL-MCNC: 8.9 MG/DL (ref 8.5–10.5)
CHLORIDE SERPL-SCNC: 103 MMOL/L (ref 96–112)
CHOLEST SERPL-MCNC: 160 MG/DL (ref 100–199)
CO2 SERPL-SCNC: 23 MMOL/L (ref 20–33)
CREAT SERPL-MCNC: 0.81 MG/DL (ref 0.5–1.4)
ERYTHROCYTE [DISTWIDTH] IN BLOOD BY AUTOMATED COUNT: 47.4 FL (ref 35.9–50)
FASTING STATUS PATIENT QL REPORTED: NORMAL
GLOBULIN SER CALC-MCNC: 2.3 G/DL (ref 1.9–3.5)
GLUCOSE SERPL-MCNC: 84 MG/DL (ref 65–99)
HCT VFR BLD AUTO: 44.2 % (ref 37–47)
HDLC SERPL-MCNC: 87 MG/DL
HGB BLD-MCNC: 14.3 G/DL (ref 12–16)
LDLC SERPL CALC-MCNC: 67 MG/DL
MCH RBC QN AUTO: 31.8 PG (ref 27–33)
MCHC RBC AUTO-ENTMCNC: 32.4 G/DL (ref 33.6–35)
MCV RBC AUTO: 98.2 FL (ref 81.4–97.8)
PLATELET # BLD AUTO: 226 K/UL (ref 164–446)
PMV BLD AUTO: 12.7 FL (ref 9–12.9)
POTASSIUM SERPL-SCNC: 4.6 MMOL/L (ref 3.6–5.5)
PROT SERPL-MCNC: 6.5 G/DL (ref 6–8.2)
RBC # BLD AUTO: 4.5 M/UL (ref 4.2–5.4)
SODIUM SERPL-SCNC: 137 MMOL/L (ref 135–145)
T4 FREE SERPL-MCNC: 1.26 NG/DL (ref 0.93–1.7)
TRIGL SERPL-MCNC: 31 MG/DL (ref 0–149)
TSH SERPL DL<=0.005 MIU/L-ACNC: 0.76 UIU/ML (ref 0.38–5.33)
WBC # BLD AUTO: 6.6 K/UL (ref 4.8–10.8)

## 2020-07-22 PROCEDURE — 84443 ASSAY THYROID STIM HORMONE: CPT

## 2020-07-22 PROCEDURE — 85027 COMPLETE CBC AUTOMATED: CPT

## 2020-07-22 PROCEDURE — 80061 LIPID PANEL: CPT

## 2020-07-22 PROCEDURE — 80053 COMPREHEN METABOLIC PANEL: CPT

## 2020-07-22 PROCEDURE — 36415 COLL VENOUS BLD VENIPUNCTURE: CPT

## 2020-07-22 PROCEDURE — 84439 ASSAY OF FREE THYROXINE: CPT

## 2020-07-22 PROCEDURE — 82306 VITAMIN D 25 HYDROXY: CPT

## 2020-07-23 NOTE — TELEPHONE ENCOUNTER
Phone Number Called: [phone] (home)      Call outcome: Did not leave a detailed message. Requested patient to call back.    Message: left message for patient about the following. She has an upcoming appointment on 08/17/2020

## 2020-08-10 ENCOUNTER — PATIENT MESSAGE (OUTPATIENT)
Dept: MEDICAL GROUP | Facility: PHYSICIAN GROUP | Age: 60
End: 2020-08-10

## 2020-08-17 ENCOUNTER — OFFICE VISIT (OUTPATIENT)
Dept: MEDICAL GROUP | Facility: PHYSICIAN GROUP | Age: 60
End: 2020-08-17
Payer: COMMERCIAL

## 2020-08-17 ENCOUNTER — HOSPITAL ENCOUNTER (OUTPATIENT)
Dept: LAB | Facility: MEDICAL CENTER | Age: 60
End: 2020-08-17
Attending: FAMILY MEDICINE
Payer: COMMERCIAL

## 2020-08-17 ENCOUNTER — HOSPITAL ENCOUNTER (OUTPATIENT)
Facility: MEDICAL CENTER | Age: 60
End: 2020-08-17
Attending: FAMILY MEDICINE
Payer: COMMERCIAL

## 2020-08-17 VITALS
DIASTOLIC BLOOD PRESSURE: 86 MMHG | HEART RATE: 67 BPM | HEIGHT: 66 IN | OXYGEN SATURATION: 95 % | BODY MASS INDEX: 27.64 KG/M2 | TEMPERATURE: 98.8 F | WEIGHT: 172 LBS | SYSTOLIC BLOOD PRESSURE: 124 MMHG

## 2020-08-17 DIAGNOSIS — Z01.419 WELL WOMAN EXAM WITH ROUTINE GYNECOLOGICAL EXAM: ICD-10-CM

## 2020-08-17 DIAGNOSIS — Z23 NEED FOR VACCINATION: ICD-10-CM

## 2020-08-17 DIAGNOSIS — Z80.3 FAMILY HISTORY OF SECONDARY BREAST CANCER: ICD-10-CM

## 2020-08-17 PROCEDURE — 87624 HPV HI-RISK TYP POOLED RSLT: CPT

## 2020-08-17 PROCEDURE — 90471 IMMUNIZATION ADMIN: CPT | Performed by: FAMILY MEDICINE

## 2020-08-17 PROCEDURE — 99396 PREV VISIT EST AGE 40-64: CPT | Mod: 25 | Performed by: FAMILY MEDICINE

## 2020-08-17 PROCEDURE — 90715 TDAP VACCINE 7 YRS/> IM: CPT | Performed by: FAMILY MEDICINE

## 2020-08-17 PROCEDURE — 88175 CYTOPATH C/V AUTO FLUID REDO: CPT

## 2020-08-17 SDOH — HEALTH STABILITY: MENTAL HEALTH: HOW MANY STANDARD DRINKS CONTAINING ALCOHOL DO YOU HAVE ON A TYPICAL DAY?: 1 OR 2

## 2020-08-17 SDOH — HEALTH STABILITY: MENTAL HEALTH: HOW OFTEN DO YOU HAVE A DRINK CONTAINING ALCOHOL?: MONTHLY OR LESS

## 2020-08-17 ASSESSMENT — PATIENT HEALTH QUESTIONNAIRE - PHQ9: CLINICAL INTERPRETATION OF PHQ2 SCORE: 0

## 2020-08-17 ASSESSMENT — FIBROSIS 4 INDEX: FIB4 SCORE: 1.17

## 2020-08-17 NOTE — PROGRESS NOTES
S:  Lizy Saleem is a 60 y.o.,femalewho presents today for her Pap and GYN exam.  .  She has been menopausal since age:55.  She has utilized HRT.  Currently taking: estradiol 0.5 and progesterone cream    Well woman exam with routine gynecological exam  This patient went through menopause at about age 55 or 56.  She is on estradiol 0.5 mg daily. Pt with family history of a paternal aunt who  with breast cancer and a paternal cousin who  with breast cancer.  Patient very interested in being tested for the BRCA gene.  Patient is using estradiol 0.5 mg daily and utilizing progesterone cream that she gets compounded at [facility].      She is , P:0.    She has not had an Abnormal Pap previously.  Her last Mammogram was done:  2019.  Her preventative health screens are up to date.    GYN ROS: no abnormal bleeding, pelvic pain or discharge, no breast pain or new or enlarging lumps on self exam    Patient Active Problem List    Diagnosis Date Noted   • Well woman exam with routine gynecological exam 2019   • Herpes labialis 2019   • Family history of abdominal aortic aneurysm 2019   • Evans's neuroma of left foot 2018   • Anxiety reaction 2018   • Acquired hypothyroidism 2018   • Menopausal symptom 2018   • Health care maintenance 2018   • Other sleep apnea 2018   • Restless legs 2018   • Family history of malignant neoplasm of breast 2017     Current Outpatient Medications on File Prior to Visit   Medication Sig Dispense Refill   • NON SPECIFIED C-E2/E3 25 mg/mlTake 0.1ml SL daily 5 Each 3   • valacyclovir (VALTREX) 1 GM Tab daily 90 Tab 3   • Non Formulary Request C-progesterone 60 mg/gm CR - apply 4 clicks topically daily 90 Each 3   • levothyroxine (SYNTHROID) 88 MCG Tab Take 1 Tab by mouth Every morning on an empty stomach. 90 Tab 2   • EFFEXOR  MG extended-release capsule TAKE 1 CAPSULE BY MOUTH EVERY  "DAY 90 Cap 3   • fluconazole (DIFLUCAN) 150 MG tablet Take 1 Tab by mouth every 7 days. 2 Tab 0   • pramipexole (MIRAPEX) 0.5 MG Tab Take 1 Tab by mouth every bedtime. 90 Tab 3   • acetaminophen (TYLENOL) 325 MG Tab Take 650 mg by mouth every four hours as needed.     • ascorbic acid (ASCORBIC ACID) 500 MG Tab Take 500 mg by mouth every day.     • estradiol (ESTRACE) 0.5 MG tablet Insert 0.5 mg in vagina every day.     • ibuprofen (MOTRIN) 200 MG Tab Take 400 mg by mouth every 6 hours as needed.       No current facility-administered medications on file prior to visit.      Social History     Tobacco Use   • Smoking status: Never Smoker   • Smokeless tobacco: Never Used   Substance Use Topics   • Alcohol use: Yes     Frequency: Monthly or less     Drinks per session: 1 or 2       O: /86 (BP Location: Left arm, Patient Position: Sitting, BP Cuff Size: Adult)   Pulse 67   Temp 37.1 °C (98.8 °F) (Temporal)   Ht 1.676 m (5' 6\")   Wt 78 kg (172 lb)   SpO2 95%   BMI 27.76 kg/m²   Vitals Noted and Reviewed    Vulva: grossly unremarkable  Vagina: no abnormal discharge, normal color  Cervix: Parous, nonfriable, no surface lesions identified, Pap was performed  Uterus: Normal shape, position and consistency, normal size  Bimanual exam: No uteromegaly, negative chandelier sign, adnexa freely movable and without enlargements bilaterally  Rectal: not performed    A:  NormalGYN Exam      P:    pap smear    Pap was processed and sent to the lab    Recommend follow up in 5 years if this pap is normal  "

## 2020-08-17 NOTE — ASSESSMENT & PLAN NOTE
This patient went through menopause at about age 55 or 56.  She is on estradiol 0.5 mg daily. Pt with family history of a paternal aunt who  with breast cancer and a paternal cousin who  with breast cancer.  Patient very interested in being tested for the BRCA gene.  Patient is using estradiol 0.5 mg daily and utilizing progesterone cream that she gets compounded at [facility].

## 2020-08-17 NOTE — PROGRESS NOTES
S:  Lizy Saleem is a 60 y.o.,femalewho presents today for her Pap and GYN exam.  .  She has been menopausal since age:***.  She {HAS HAS NOT:19312} utilized HRT.  Currently taking: ***    No problem-specific Assessment & Plan notes found for this encounter.      She is G:{NUMBERS 0-10:80351}, P:{NUMBERS 0-10:65833}.    She {HAS HAS NOT:13987} had an Abnormal Pap previously.  Her last Mammogram was done:  ***.  Her preventative health screens {ARE/ARE NOT:9034} up to date.    GYN ROS: no abnormal bleeding, pelvic pain or discharge, no breast pain or new or enlarging lumps on self exam    Patient Active Problem List    Diagnosis Date Noted   • Well woman exam with routine gynecological exam 07/17/2019   • Herpes labialis 02/08/2019   • Family history of abdominal aortic aneurysm 02/08/2019   • Evans's neuroma of left foot 11/08/2018   • Anxiety reaction 11/01/2018   • Acquired hypothyroidism 11/01/2018   • Menopausal symptom 11/01/2018   • Health care maintenance 11/01/2018   • Other sleep apnea 11/01/2018   • Restless legs 11/01/2018   • Family history of malignant neoplasm of breast 11/17/2017     Current Outpatient Medications on File Prior to Visit   Medication Sig Dispense Refill   • NON SPECIFIED C-E2/E3 25 mg/mlTake 0.1ml SL daily 5 Each 3   • valacyclovir (VALTREX) 1 GM Tab daily 90 Tab 3   • Non Formulary Request C-progesterone 60 mg/gm CR - apply 4 clicks topically daily 90 Each 3   • levothyroxine (SYNTHROID) 88 MCG Tab Take 1 Tab by mouth Every morning on an empty stomach. 90 Tab 2   • EFFEXOR  MG extended-release capsule TAKE 1 CAPSULE BY MOUTH EVERY DAY 90 Cap 3   • fluconazole (DIFLUCAN) 150 MG tablet Take 1 Tab by mouth every 7 days. 2 Tab 0   • pramipexole (MIRAPEX) 0.5 MG Tab Take 1 Tab by mouth every bedtime. 90 Tab 3   • acetaminophen (TYLENOL) 325 MG Tab Take 650 mg by mouth every four hours as needed.     • ascorbic acid (ASCORBIC ACID) 500 MG Tab Take 500 mg by mouth every  "day.     • estradiol (ESTRACE) 0.5 MG tablet Insert 0.5 mg in vagina every day.     • ibuprofen (MOTRIN) 200 MG Tab Take 400 mg by mouth every 6 hours as needed.       No current facility-administered medications on file prior to visit.      Social History     Tobacco Use   • Smoking status: Never Smoker   • Smokeless tobacco: Never Used   Substance Use Topics   • Alcohol use: Yes     Frequency: Monthly or less     Drinks per session: 1 or 2       O: /86 (BP Location: Left arm, Patient Position: Sitting, BP Cuff Size: Adult)   Pulse 67   Temp 37.1 °C (98.8 °F) (Temporal)   Ht 1.676 m (5' 6\")   Wt 78 kg (172 lb)   SpO2 95%   BMI 27.76 kg/m²   Vitals Noted and Reviewed  Breasts: {BREAST EXAM:1219}  Lungs: {LUNG EXAM-PULM:47774}  Cardiac: {HEART BRIEF EXAM:513}  Vulva: {VULVA:59835}  Vagina: {VAGINA EXAM:62476}  Cervix: Parous, nonfriable, no surface lesions identified, Pap was performed  Uterus: {UTERUS:71409}  Bimanual exam: No uteromegaly, negative chandelier sign, adnexa freely movable and without enlargements bilaterally  Rectal: {RECTAL:83457}    A:  {NORMAL, ABNORMAL:84104}GYN Exam      P:    {GYN PLAN:5269::\"mammogram\",\"pap smear\",\"return annually or prn\"}    Pap was processed and sent to the lab    Recommend follow up in ***  "

## 2020-08-18 DIAGNOSIS — Z01.419 WELL WOMAN EXAM WITH ROUTINE GYNECOLOGICAL EXAM: ICD-10-CM

## 2020-08-20 LAB
CYTOLOGY REG CYTOL: NORMAL
HPV HR 12 DNA CVX QL NAA+PROBE: NEGATIVE
HPV16 DNA SPEC QL NAA+PROBE: NEGATIVE
HPV18 DNA SPEC QL NAA+PROBE: NEGATIVE
SPECIMEN SOURCE: NORMAL

## 2020-08-22 ENCOUNTER — PATIENT MESSAGE (OUTPATIENT)
Dept: MEDICAL GROUP | Facility: PHYSICIAN GROUP | Age: 60
End: 2020-08-22

## 2020-09-02 ENCOUNTER — HOSPITAL ENCOUNTER (OUTPATIENT)
Dept: LAB | Facility: MEDICAL CENTER | Age: 60
End: 2020-09-02
Attending: FAMILY MEDICINE
Payer: COMMERCIAL

## 2020-09-02 PROCEDURE — 369999 HCHG MISC LAB CHARGE

## 2020-09-02 PROCEDURE — 81163 BRCA1&2 GENE FULL SEQ ALYS: CPT

## 2020-09-02 PROCEDURE — 36415 COLL VENOUS BLD VENIPUNCTURE: CPT

## 2020-09-09 ENCOUNTER — PATIENT MESSAGE (OUTPATIENT)
Dept: MEDICAL GROUP | Facility: PHYSICIAN GROUP | Age: 60
End: 2020-09-09

## 2020-09-10 ENCOUNTER — PATIENT MESSAGE (OUTPATIENT)
Dept: MEDICAL GROUP | Facility: PHYSICIAN GROUP | Age: 60
End: 2020-09-10

## 2020-09-11 NOTE — TELEPHONE ENCOUNTER
From: Lizy Saleem  To: Rosalie Koehler M.D.  Sent: 9/10/2020 2:56 PM PDT  Subject: Non-Urgent Medical Question    Thank you, that is what I will lilliana for the BRCA test.  In the meantime, the past week I have been feeling very fatigued, occasional headache and have no appetite at all. I don’t have a fever and my pulse ox is 96. Just in case, I’m getting tested for Covid tomorrow morning at [facility]. Besides resting and drinking as much fluids as possible, do you think I should do anything else? I feel like I’m worse today but don’t feel like there’s much I can do. I’ve been isolating at home.  Thank you for your thoughts.   Lizy Saleem      ----- Message -----   From:Rosalie Koehler M.D.   Sent:9/9/2020 6:36 PM PDT   To:Lizy Saleem   Subject:RE: Non-Urgent Medical Question    I suggest [id number] in your situation. Should catch >90% if you have the gene.  Take care, Dr. Wiggins      ----- Message -----   From:Lizy Saleem   Sent:9/9/2020 9:15 AM PDT   To:Rosalie Koehler M.D.   Subject:Non-Urgent Medical Question    GM,  Hope all is well.  I’ve got this BRCA form completed except for the bottom where it asks which test I intend to order. I’m getting screened because my paternal aunt and paternal cousin had breast cancer. I don’t know if I should check a box or boxes in the first tier testing or second tier testing.  Thank you for your input.  Lizy Saleem

## 2020-09-14 ENCOUNTER — PATIENT MESSAGE (OUTPATIENT)
Dept: MEDICAL GROUP | Facility: PHYSICIAN GROUP | Age: 60
End: 2020-09-14

## 2020-10-05 LAB — TEST NAME 95000: NORMAL

## 2020-10-15 ENCOUNTER — PATIENT MESSAGE (OUTPATIENT)
Dept: MEDICAL GROUP | Facility: PHYSICIAN GROUP | Age: 60
End: 2020-10-15

## 2020-10-16 ENCOUNTER — OFFICE VISIT (OUTPATIENT)
Dept: URGENT CARE | Facility: PHYSICIAN GROUP | Age: 60
End: 2020-10-16
Payer: COMMERCIAL

## 2020-10-16 ENCOUNTER — NURSE TRIAGE (OUTPATIENT)
Dept: HEALTH INFORMATION MANAGEMENT | Facility: OTHER | Age: 60
End: 2020-10-16

## 2020-10-16 VITALS
TEMPERATURE: 98.3 F | DIASTOLIC BLOOD PRESSURE: 80 MMHG | BODY MASS INDEX: 27.32 KG/M2 | RESPIRATION RATE: 16 BRPM | WEIGHT: 170 LBS | OXYGEN SATURATION: 98 % | HEART RATE: 69 BPM | HEIGHT: 66 IN | SYSTOLIC BLOOD PRESSURE: 134 MMHG

## 2020-10-16 DIAGNOSIS — R19.7 DIARRHEA, UNSPECIFIED TYPE: ICD-10-CM

## 2020-10-16 PROCEDURE — 99214 OFFICE O/P EST MOD 30 MIN: CPT | Performed by: PHYSICIAN ASSISTANT

## 2020-10-16 RX ORDER — AZITHROMYCIN 500 MG/1
500 TABLET, FILM COATED ORAL DAILY
Qty: 3 TAB | Refills: 0 | Status: SHIPPED | OUTPATIENT
Start: 2020-10-16 | End: 2020-10-19

## 2020-10-16 ASSESSMENT — ENCOUNTER SYMPTOMS
DIARRHEA: 1
ABDOMINAL PAIN: 0
SHORTNESS OF BREATH: 0
NAUSEA: 0
DIZZINESS: 0
SENSORY CHANGE: 0
MYALGIAS: 0
CONSTIPATION: 0
VOMITING: 0
WEIGHT LOSS: 0
BLOOD IN STOOL: 0
HEADACHES: 0
PALPITATIONS: 0
FLANK PAIN: 0
HEARTBURN: 0
FEVER: 0
TINGLING: 0
WEAKNESS: 0
DIAPHORESIS: 0
CHILLS: 0
COUGH: 0

## 2020-10-16 ASSESSMENT — FIBROSIS 4 INDEX: FIB4 SCORE: 1.17

## 2020-10-16 NOTE — PROGRESS NOTES
Subjective:   Lizy Saleem is a 60 y.o. female who presents for Diarrhea (x6 days)      HPI:  This is a very pleasant 60-year-old female presenting to the clinic with diarrhea x6 days.  Patient states her diarrhea started with 2-3 episodes per day.  This persisted for approximately 3 days.  She then started Imodium as well as the brat diet.  She had relief for 1 to 2 days until her symptoms returned.  She states over the last 24 hours she has had approximately 10 bowel movements.  Her diarrhea is light brown.  She denies any blood or mucus in the stool.  She denies any associated abdominal pain, fevers, chills, myalgias, nausea, vomiting, shortness of breath or chest pain.  She denies any history of abdominal complaints such as this.  She has not recently been on any antibiotics.  She has had no recent travel or camping.  The only abnormality she can think of is that she ate pumpkin pie before her symptoms began.  She has been staying well-hydrated throughout this whole week.  No new medications.    Review of Systems   Constitutional: Negative for chills, diaphoresis, fever, malaise/fatigue and weight loss.   HENT: Negative for congestion.    Respiratory: Negative for cough and shortness of breath.    Cardiovascular: Negative for chest pain and palpitations.   Gastrointestinal: Positive for diarrhea. Negative for abdominal pain, blood in stool, constipation, heartburn, melena, nausea and vomiting.   Genitourinary: Negative for dysuria, flank pain, frequency, hematuria and urgency.   Musculoskeletal: Negative for myalgias.   Skin: Negative for rash.   Neurological: Negative for dizziness, tingling, sensory change, weakness and headaches.         Medications:    • acetaminophen Tabs  • ascorbic acid Tabs  • azithromycin  • Effexor XR Cp24  • estradiol  • fluconazole  • ibuprofen Tabs  • levothyroxine Tabs  • Non Formulary Request  • NON SPECIFIED  • pramipexole Tabs  • valacyclovir  "Tabs    Allergies: Bloodless    Problem List: Lizy Saleem has Anxiety reaction; Family history of malignant neoplasm of breast; Acquired hypothyroidism; Menopausal symptom; Health care maintenance; Other sleep apnea; Restless legs; Evans's neuroma of left foot; Herpes labialis; Family history of abdominal aortic aneurysm; and Well woman exam with routine gynecological exam on their problem list.    Surgical History:  Past Surgical History:   Procedure Laterality Date   • HYSTEROSCOPY WITH VIDEO DIAGNOSTIC  10/23/2015    Procedure: HYSTEROSCOPY WITH VIDEO DIAGNOSTIC;  Surgeon: Lesli Frias M.D.;  Location: SURGERY [facility];  Service:    • DILATION AND CURETTAGE  10/23/2015    Procedure: DILATION AND CURETTAGE;  Surgeon: Lesli Frias M.D.;  Location: SURGERY [facility];  Service:    • BUNIONECTOMY Right 2003   • BUNIONECTOMY Left 2000   • ARTHROSCOPY, KNEE Left 1992       Past Social Hx: Lizy Saleem  reports that she has never smoked. She has never used smokeless tobacco. She reports current alcohol use. She reports that she does not use drugs.     Past Family Hx:  Lizy Saleem family history includes Breast Cancer in her paternal aunt; Cancer in her father and mother; Hyperlipidemia in her father; Hypertension in her father; Lung Disease in her mother; Other in her father; Thyroid in her mother.     Problem list, medications, and allergies reviewed by myself today in Epic.     Objective:     /80   Pulse 69   Temp 36.8 °C (98.3 °F) (Temporal)   Resp 16   Ht 1.676 m (5' 6\")   Wt 77.1 kg (170 lb)   SpO2 98%   BMI 27.44 kg/m²     Physical Exam  Constitutional:       General: She is not in acute distress.     Appearance: Normal appearance. She is not ill-appearing, toxic-appearing or diaphoretic.   HENT:      Head: Normocephalic and atraumatic.      Mouth/Throat:      Mouth: Mucous membranes are moist.      Pharynx: No oropharyngeal exudate or " posterior oropharyngeal erythema.   Eyes:      Conjunctiva/sclera: Conjunctivae normal.   Neck:      Musculoskeletal: Normal range of motion. No muscular tenderness.   Cardiovascular:      Rate and Rhythm: Normal rate and regular rhythm.      Pulses: Normal pulses.      Heart sounds: Normal heart sounds.   Pulmonary:      Effort: Pulmonary effort is normal.      Breath sounds: Normal breath sounds. No wheezing.   Abdominal:      General: Bowel sounds are increased. There is no distension.      Palpations: Abdomen is soft. There is no mass.      Tenderness: There is no abdominal tenderness. There is no right CVA tenderness, left CVA tenderness, guarding or rebound.      Hernia: No hernia is present.   Musculoskeletal: Normal range of motion.   Lymphadenopathy:      Cervical: No cervical adenopathy.   Skin:     General: Skin is warm and dry.      Capillary Refill: Capillary refill takes less than 2 seconds.   Neurological:      General: No focal deficit present.      Mental Status: She is alert and oriented to person, place, and time. Mental status is at baseline.   Psychiatric:         Mood and Affect: Mood normal.         Thought Content: Thought content normal.            Assessment/Plan:     Diagnosis and associated orders:     1. Diarrhea, unspecified type  azithromycin (ZITHROMAX) 500 MG tablet      Comments/MDM:       • Differential diagnoses and treatment options were discussed with the patient at length today.  The patient has had diarrhea without any other associated symptoms.  She has had no recent antibiotic use.  No recent travel.  She denies any blood or mucus in her stool.  She has had no fevers or chills.  At this time I recommended starting azithromycin 500 mg for 3 days.  Continue using Imodium and the brat diet.  Increase fluid intake make sure you are eating plenty of electrolytes.  If symptoms fail to improve over the 3 days recommended returning to clinic for further blood work and possible stool  cultures.  If it anytime you notice any abdominal pain fevers, chills nausea or vomiting like you to return to the clinic or nearest emergency department.  Encouraged to call with any questions or concerns.               Differential diagnosis, natural history, supportive care, and indications for immediate follow-up discussed.    Advised the patient to follow-up with the primary care physician for recheck, reevaluation, and consideration of further management.    Please note that this dictation was created using voice recognition software. I have made reasonable attempt to correct obvious errors, but I expect that there are errors of grammar and possibly content that I did not discover before finalizing the note.    This note was electronically signed by HALEIGH López PA-C

## 2020-10-16 NOTE — TELEPHONE ENCOUNTER
Regarding: COVID SCREENING  ----- Message from Ottoniel Russell sent at 10/16/2020  1:00 PM PDT -----  TRANSFERRED PATIENT TO THE NURSE TEAM DUE TO THESE SYMPTOMS: DIARRHEA

## 2020-10-16 NOTE — TELEPHONE ENCOUNTER
1. Caller Name:Lizy Saleem                Call Back Number:  [facility] PCP or Specialty Provider: Yes         2.  In the last two weeks, has the patient had any new or worsening symptoms (not explained by alternative diagnosis)? Yes, the patient reports the following COVID-19 consistent symptoms: diarrhea, 4 days. Covid negative9/30.     3.  Does patient have any comoribidities? None     4.  Has the patient traveled in the last 14 days OR had any known contact with someone who is suspected or confirmed to have COVID-19?  No.    5. Disposition: Advised to perform self care, monitor for worsening symptoms and to call back in 3 days if no improvement    Note routed to [facility] Provider: PRAVIN only.

## 2020-10-18 ENCOUNTER — OFFICE VISIT (OUTPATIENT)
Dept: URGENT CARE | Facility: PHYSICIAN GROUP | Age: 60
End: 2020-10-18
Payer: COMMERCIAL

## 2020-10-18 ENCOUNTER — HOSPITAL ENCOUNTER (OUTPATIENT)
Facility: MEDICAL CENTER | Age: 60
End: 2020-10-18
Attending: NURSE PRACTITIONER
Payer: COMMERCIAL

## 2020-10-18 VITALS
HEART RATE: 66 BPM | DIASTOLIC BLOOD PRESSURE: 86 MMHG | OXYGEN SATURATION: 97 % | SYSTOLIC BLOOD PRESSURE: 114 MMHG | BODY MASS INDEX: 27.64 KG/M2 | WEIGHT: 172 LBS | TEMPERATURE: 97.9 F | HEIGHT: 66 IN

## 2020-10-18 DIAGNOSIS — R19.7 DIARRHEA, UNSPECIFIED TYPE: ICD-10-CM

## 2020-10-18 LAB
ALBUMIN SERPL BCP-MCNC: 4.1 G/DL (ref 3.2–4.9)
ALBUMIN/GLOB SERPL: 1.7 G/DL
ALP SERPL-CCNC: 60 U/L (ref 30–99)
ALT SERPL-CCNC: 21 U/L (ref 2–50)
ANION GAP SERPL CALC-SCNC: 8 MMOL/L (ref 7–16)
AST SERPL-CCNC: 21 U/L (ref 12–45)
BASOPHILS # BLD AUTO: 0.6 % (ref 0–1.8)
BASOPHILS # BLD: 0.04 K/UL (ref 0–0.12)
BILIRUB SERPL-MCNC: 0.2 MG/DL (ref 0.1–1.5)
BUN SERPL-MCNC: 9 MG/DL (ref 8–22)
CALCIUM SERPL-MCNC: 8.9 MG/DL (ref 8.5–10.5)
CHLORIDE SERPL-SCNC: 101 MMOL/L (ref 96–112)
CO2 SERPL-SCNC: 29 MMOL/L (ref 20–33)
CREAT SERPL-MCNC: 0.78 MG/DL (ref 0.5–1.4)
EOSINOPHIL # BLD AUTO: 0.23 K/UL (ref 0–0.51)
EOSINOPHIL NFR BLD: 3.3 % (ref 0–6.9)
ERYTHROCYTE [DISTWIDTH] IN BLOOD BY AUTOMATED COUNT: 50 FL (ref 35.9–50)
GLOBULIN SER CALC-MCNC: 2.4 G/DL (ref 1.9–3.5)
GLUCOSE SERPL-MCNC: 95 MG/DL (ref 65–99)
HCT VFR BLD AUTO: 44.1 % (ref 37–47)
HGB BLD-MCNC: 14.5 G/DL (ref 12–16)
IMM GRANULOCYTES # BLD AUTO: 0.02 K/UL (ref 0–0.11)
IMM GRANULOCYTES NFR BLD AUTO: 0.3 % (ref 0–0.9)
LYMPHOCYTES # BLD AUTO: 2.18 K/UL (ref 1–4.8)
LYMPHOCYTES NFR BLD: 30.9 % (ref 22–41)
MCH RBC QN AUTO: 31.9 PG (ref 27–33)
MCHC RBC AUTO-ENTMCNC: 32.9 G/DL (ref 33.6–35)
MCV RBC AUTO: 96.9 FL (ref 81.4–97.8)
MONOCYTES # BLD AUTO: 0.43 K/UL (ref 0–0.85)
MONOCYTES NFR BLD AUTO: 6.1 % (ref 0–13.4)
NEUTROPHILS # BLD AUTO: 4.16 K/UL (ref 2–7.15)
NEUTROPHILS NFR BLD: 58.8 % (ref 44–72)
NRBC # BLD AUTO: 0 K/UL
NRBC BLD-RTO: 0 /100 WBC
PLATELET # BLD AUTO: 244 K/UL (ref 164–446)
PMV BLD AUTO: 12.3 FL (ref 9–12.9)
POTASSIUM SERPL-SCNC: 4.9 MMOL/L (ref 3.6–5.5)
PROT SERPL-MCNC: 6.5 G/DL (ref 6–8.2)
RBC # BLD AUTO: 4.55 M/UL (ref 4.2–5.4)
SODIUM SERPL-SCNC: 138 MMOL/L (ref 135–145)
WBC # BLD AUTO: 7.1 K/UL (ref 4.8–10.8)

## 2020-10-18 PROCEDURE — 99213 OFFICE O/P EST LOW 20 MIN: CPT | Performed by: NURSE PRACTITIONER

## 2020-10-18 PROCEDURE — U0003 INFECTIOUS AGENT DETECTION BY NUCLEIC ACID (DNA OR RNA); SEVERE ACUTE RESPIRATORY SYNDROME CORONAVIRUS 2 (SARS-COV-2) (CORONAVIRUS DISEASE [COVID-19]), AMPLIFIED PROBE TECHNIQUE, MAKING USE OF HIGH THROUGHPUT TECHNOLOGIES AS DESCRIBED BY CMS-2020-01-R: HCPCS

## 2020-10-18 PROCEDURE — 80053 COMPREHEN METABOLIC PANEL: CPT

## 2020-10-18 PROCEDURE — 85025 COMPLETE CBC W/AUTO DIFF WBC: CPT

## 2020-10-18 ASSESSMENT — FIBROSIS 4 INDEX: FIB4 SCORE: 1.17

## 2020-10-19 ENCOUNTER — HOSPITAL ENCOUNTER (OUTPATIENT)
Facility: MEDICAL CENTER | Age: 60
End: 2020-10-19
Attending: NURSE PRACTITIONER
Payer: COMMERCIAL

## 2020-10-19 DIAGNOSIS — R19.7 DIARRHEA, UNSPECIFIED TYPE: ICD-10-CM

## 2020-10-19 LAB
COVID ORDER STATUS COVID19: NORMAL
SARS-COV-2 RNA RESP QL NAA+PROBE: NOTDETECTED
SPECIMEN SOURCE: NORMAL

## 2020-10-19 PROCEDURE — 87328 CRYPTOSPORIDIUM AG IA: CPT

## 2020-10-19 PROCEDURE — 87329 GIARDIA AG IA: CPT

## 2020-10-19 PROCEDURE — 87493 C DIFF AMPLIFIED PROBE: CPT

## 2020-10-19 ASSESSMENT — ENCOUNTER SYMPTOMS
CHILLS: 0
MYALGIAS: 0
DIARRHEA: 1
ABDOMINAL PAIN: 0
SORE THROAT: 0
FEVER: 0
SHORTNESS OF BREATH: 0
NAUSEA: 0
DIZZINESS: 0
VOMITING: 0
HEADACHES: 0
EYE REDNESS: 0
SWEATS: 0

## 2020-10-19 NOTE — PROGRESS NOTES
"Subjective:   Lizy Saleem  is a 60 y.o. female who presents for Diarrhea (abx finished, diarrhea)        Diarrhea   This is a recurrent problem. The current episode started in the past 7 days (was seen 10/16 was treated with azithromycin.  Stool cultures were not obtained at this visit). The problem occurs 2 to 4 times per day (as of yesterday x2 today. finished abx today ). The problem has been gradually improving. The stool consistency is described as watery. The patient states that diarrhea does not awaken her from sleep. Pertinent negatives include no abdominal pain, chills, fever, headaches, myalgias, sweats or vomiting. Nothing aggravates the symptoms. There are no known risk factors. Treatments tried: x3 days abx. The treatment provided mild relief. There is no history of bowel resection, inflammatory bowel disease, malabsorption or a recent abdominal surgery.     Review of Systems   Constitutional: Negative for chills and fever.   HENT: Negative for sore throat.    Eyes: Negative for redness.   Respiratory: Negative for shortness of breath.    Cardiovascular: Negative for chest pain.   Gastrointestinal: Positive for diarrhea. Negative for abdominal pain, nausea and vomiting.   Genitourinary: Negative for dysuria.   Musculoskeletal: Negative for myalgias.   Skin: Negative for rash.   Neurological: Negative for dizziness and headaches.     No Active Allergies   Objective:   /86   Pulse 66   Temp 36.6 °C (97.9 °F)   Ht 1.676 m (5' 6\")   Wt 78 kg (172 lb)   SpO2 97%   BMI 27.76 kg/m²   Physical Exam  Vitals signs and nursing note reviewed.   Constitutional:       General: She is not in acute distress.     Appearance: She is well-developed. She is not ill-appearing or toxic-appearing.   HENT:      Head: Normocephalic and atraumatic.      Right Ear: External ear normal.      Left Ear: External ear normal.      Nose: Nose normal.      Mouth/Throat:      Mouth: Mucous membranes are moist. "   Eyes:      Conjunctiva/sclera: Conjunctivae normal.   Cardiovascular:      Rate and Rhythm: Normal rate.   Pulmonary:      Effort: Pulmonary effort is normal. No respiratory distress.      Breath sounds: Normal breath sounds.   Abdominal:      General: Bowel sounds are increased. There is no distension.      Tenderness: There is generalized abdominal tenderness. There is no guarding or rebound.   Musculoskeletal: Normal range of motion.   Skin:     General: Skin is warm and dry.   Neurological:      General: No focal deficit present.      Mental Status: She is alert and oriented to person, place, and time. Mental status is at baseline.      Gait: Gait (gait at baseline) normal.   Psychiatric:         Judgment: Judgment normal.           Assessment/Plan:   1. Diarrhea, unspecified type  - CULTURE STOOL; Future  - CRYPTO/GIARDIA RAPID ASSAY; Future  - C Diff by PCR rflx Toxin; Future  - Comp Metabolic Panel; Future  - CBC WITH DIFFERENTIAL; Future  - COVID/SARS COV-2 PCR; Future  Patient is a 60-year-old female present with stated above, patient still having watery diarrhea however it does seem to have decreased since her initial visit on 10/16.  She did finish azithromycin the day.  Discussed half-life of medication.  Will not treat with another course of antibiotics today.  We will follow-up with pending lab results and treat as indicated.  Will check labs for electrolytes, infection, anemia.  Encourage patient to increase fluid and electrolytes.  May trial over-the-counter Imodium  Differential diagnosis, natural history, supportive care, and indications for immediate follow-up discussed.

## 2020-10-20 LAB
C DIFF DNA SPEC QL NAA+PROBE: NEGATIVE
C DIFF TOX GENS STL QL NAA+PROBE: NEGATIVE
G LAMBLIA+C PARVUM AG STL QL RAPID: NORMAL
SIGNIFICANT IND 70042: NORMAL
SITE SITE: NORMAL
SOURCE SOURCE: NORMAL

## 2020-10-27 ENCOUNTER — OFFICE VISIT (OUTPATIENT)
Dept: URGENT CARE | Facility: PHYSICIAN GROUP | Age: 60
End: 2020-10-27
Payer: COMMERCIAL

## 2020-10-27 VITALS
DIASTOLIC BLOOD PRESSURE: 76 MMHG | WEIGHT: 174 LBS | HEART RATE: 74 BPM | OXYGEN SATURATION: 98 % | BODY MASS INDEX: 27.97 KG/M2 | RESPIRATION RATE: 14 BRPM | SYSTOLIC BLOOD PRESSURE: 118 MMHG | TEMPERATURE: 96.9 F | HEIGHT: 66 IN

## 2020-10-27 DIAGNOSIS — R19.7 DIARRHEA, UNSPECIFIED TYPE: ICD-10-CM

## 2020-10-27 PROCEDURE — 99214 OFFICE O/P EST MOD 30 MIN: CPT | Performed by: FAMILY MEDICINE

## 2020-10-27 ASSESSMENT — ENCOUNTER SYMPTOMS: DIARRHEA: 1

## 2020-10-27 ASSESSMENT — FIBROSIS 4 INDEX: FIB4 SCORE: 1.11

## 2020-10-27 NOTE — PROGRESS NOTES
Subjective:      Lizy Saleem is a 60 y.o. female who presents with Diarrhea (started again on friday )      - This is a pleasant, well nourished and nontoxic appearing 60 y.o. female with c/o ongoing on/off non bloody diarrhea ~2wks. Seen 1 wk ago and given zithromax and seemed to help for about 3-4 days. Gets brown watery stool 3-6x/day. No NVFC, no recent travel r abx use prior to symptoms starting. She is able to eat/drink, no abd pain.             ALLERGIES:  Patient has no active allergies.     PMH:  Past Medical History:   Diagnosis Date   • Acquired hypothyroidism 11/1/2018   • Anesthesia     PONV   • Anxiety reaction 11/1/2018   • Family history of abdominal aortic aneurysm 2/8/2019   • Herpes labialis 2/8/2019   • Evans's neuroma of left foot 11/8/2018        PSH:  Past Surgical History:   Procedure Laterality Date   • HYSTEROSCOPY WITH VIDEO DIAGNOSTIC  10/23/2015    Procedure: HYSTEROSCOPY WITH VIDEO DIAGNOSTIC;  Surgeon: Lesli Frias M.D.;  Location: SURGERY [facility];  Service:    • DILATION AND CURETTAGE  10/23/2015    Procedure: DILATION AND CURETTAGE;  Surgeon: Lesli Frias M.D.;  Location: SURGERY [facility];  Service:    • BUNIONECTOMY Right 2003   • BUNIONECTOMY Left 2000   • ARTHROSCOPY, KNEE Left 1992       MEDS:    Current Outpatient Medications:   •  NON SPECIFIED, C-E2/E3 25 mg/mlTake 0.1ml SL daily, Disp: 5 Each, Rfl: 3  •  valacyclovir (VALTREX) 1 GM Tab, daily, Disp: 90 Tab, Rfl: 3  •  Non Formulary Request, C-progesterone 60 mg/gm CR - apply 4 clicks topically daily, Disp: 90 Each, Rfl: 3  •  levothyroxine (SYNTHROID) 88 MCG Tab, Take 1 Tab by mouth Every morning on an empty stomach., Disp: 90 Tab, Rfl: 2  •  EFFEXOR  MG extended-release capsule, TAKE 1 CAPSULE BY MOUTH EVERY DAY, Disp: 90 Cap, Rfl: 3  •  acetaminophen (TYLENOL) 325 MG Tab, Take 650 mg by mouth every four hours as needed., Disp: , Rfl:   •  ibuprofen (MOTRIN) 200 MG Tab, Take  "400 mg by mouth every 6 hours as needed., Disp: , Rfl:   •  estradiol (ESTRACE) 0.5 MG tablet, Insert 0.5 mg in vagina every day., Disp: , Rfl:     ** I have documented what I find to be significant in regards to past medical, social, family and surgical history  in my HPI or under PMH/PSH/FH review section, otherwise it is contributory **           HPI    Review of Systems   Gastrointestinal: Positive for diarrhea.   All other systems reviewed and are negative.         Objective:     /76   Pulse 74   Temp 36.1 °C (96.9 °F) (Temporal)   Resp 14   Ht 1.676 m (5' 6\")   Wt 78.9 kg (174 lb)   SpO2 98%   BMI 28.08 kg/m²      Physical Exam  Vitals signs and nursing note reviewed.   Constitutional:       General: She is not in acute distress.     Appearance: She is well-developed. She is not diaphoretic.   HENT:      Head: Normocephalic and atraumatic.      Mouth/Throat:      Mouth: Mucous membranes are moist.      Pharynx: Oropharynx is clear.   Eyes:      General: No scleral icterus.     Conjunctiva/sclera: Conjunctivae normal.   Cardiovascular:      Heart sounds: Normal heart sounds. No murmur.   Pulmonary:      Effort: Pulmonary effort is normal. No respiratory distress.      Breath sounds: Normal breath sounds.   Abdominal:      General: Abdomen is flat.      Palpations: Abdomen is soft.      Tenderness: There is no abdominal tenderness. There is no guarding or rebound.   Skin:     Coloration: Skin is not pale.      Findings: No rash.   Neurological:      Mental Status: She is alert.      Motor: No abnormal muscle tone.   Psychiatric:         Mood and Affect: Mood normal.         Behavior: Behavior normal.         Judgment: Judgment normal.                 Assessment/Plan:            1. Diarrhea, unspecified type  STOOL CULT+O+P+WBC    FECAL LACTOFERRIN QUALITATIVE    REFERRAL TO GASTROENTEROLOGY    REFERRAL TO FOLLOW-UP WITH PRIMARY CARE         - Dx & d/c instructions discussed w/ patient and/or family " members.   - rest/hydrate, bland diet  - E.R. precautions discussed       Follow up with their PCP in 2-3 days strongly advised, ER if not improving or feeling/getting worse.

## 2020-10-28 ENCOUNTER — HOSPITAL ENCOUNTER (OUTPATIENT)
Facility: MEDICAL CENTER | Age: 60
End: 2020-10-28
Attending: FAMILY MEDICINE
Payer: COMMERCIAL

## 2020-10-28 ENCOUNTER — HOSPITAL ENCOUNTER (OUTPATIENT)
Facility: MEDICAL CENTER | Age: 60
End: 2020-10-28
Attending: INTERNAL MEDICINE
Payer: COMMERCIAL

## 2020-10-28 DIAGNOSIS — R19.7 DIARRHEA, UNSPECIFIED TYPE: ICD-10-CM

## 2020-10-28 LAB
C DIFF DNA SPEC QL NAA+PROBE: NEGATIVE
C DIFF TOX GENS STL QL NAA+PROBE: NEGATIVE
H PYLORI AG STL QL IA: NOT DETECTED

## 2020-10-28 PROCEDURE — 87045 FECES CULTURE AEROBIC BACT: CPT

## 2020-10-28 PROCEDURE — 87329 GIARDIA AG IA: CPT

## 2020-10-28 PROCEDURE — 87493 C DIFF AMPLIFIED PROBE: CPT

## 2020-10-28 PROCEDURE — 83520 IMMUNOASSAY QUANT NOS NONAB: CPT

## 2020-10-28 PROCEDURE — 83630 LACTOFERRIN FECAL (QUAL): CPT

## 2020-10-28 PROCEDURE — 87328 CRYPTOSPORIDIUM AG IA: CPT

## 2020-10-28 PROCEDURE — 87338 HPYLORI STOOL AG IA: CPT

## 2020-10-28 PROCEDURE — 83993 ASSAY FOR CALPROTECTIN FECAL: CPT

## 2020-10-28 PROCEDURE — 87046 STOOL CULTR AEROBIC BACT EA: CPT

## 2020-10-28 PROCEDURE — 89055 LEUKOCYTE ASSESSMENT FECAL: CPT

## 2020-10-29 LAB
G LAMBLIA+C PARVUM AG STL QL RAPID: NORMAL
SIGNIFICANT IND 70042: NORMAL
SITE SITE: NORMAL
SOURCE SOURCE: NORMAL
WBC STL QL MICRO: NORMAL

## 2020-10-30 LAB — LACTOFERRIN STL QL IA: NEGATIVE

## 2020-11-01 ENCOUNTER — PATIENT MESSAGE (OUTPATIENT)
Dept: MEDICAL GROUP | Facility: PHYSICIAN GROUP | Age: 60
End: 2020-11-01

## 2020-11-01 LAB
BACTERIA STL CULT: NORMAL
CALPROTECTIN STL-MCNT: <5 UG/G
SIGNIFICANT IND 70042: NORMAL
SITE SITE: NORMAL
SOURCE SOURCE: NORMAL

## 2020-11-02 LAB — ELASTASE PANC STL-MCNT: 91 UG/G

## 2020-11-02 RX ORDER — FLUCONAZOLE 150 MG/1
150 TABLET ORAL
Qty: 2 TAB | Refills: 0 | Status: SHIPPED | OUTPATIENT
Start: 2020-11-02 | End: 2021-01-12

## 2020-11-04 ENCOUNTER — PATIENT MESSAGE (OUTPATIENT)
Dept: MEDICAL GROUP | Facility: PHYSICIAN GROUP | Age: 60
End: 2020-11-04

## 2020-11-05 ENCOUNTER — TELEPHONE (OUTPATIENT)
Dept: MEDICAL GROUP | Facility: PHYSICIAN GROUP | Age: 60
End: 2020-11-05

## 2020-11-05 NOTE — TELEPHONE ENCOUNTER
----- Message from Lizy Saleem sent at 11/3/2020 12:54 PM PST -----  Regarding: Prescription Question  Contact: [phone]  Nando Rodriguez,  Thank you very much. She did respond to my Diflucan request. Hoping the Effexor issue can be addressed soon, as I’m almost out of it.  Thanks again,  Lizy

## 2020-11-11 ENCOUNTER — APPOINTMENT (RX ONLY)
Dept: URBAN - METROPOLITAN AREA CLINIC 4 | Facility: CLINIC | Age: 60
Setting detail: DERMATOLOGY
End: 2020-11-11

## 2020-11-11 DIAGNOSIS — D18.0 HEMANGIOMA: ICD-10-CM

## 2020-11-11 DIAGNOSIS — L81.4 OTHER MELANIN HYPERPIGMENTATION: ICD-10-CM

## 2020-11-11 DIAGNOSIS — Z71.89 OTHER SPECIFIED COUNSELING: ICD-10-CM

## 2020-11-11 DIAGNOSIS — L82.1 OTHER SEBORRHEIC KERATOSIS: ICD-10-CM

## 2020-11-11 DIAGNOSIS — D22 MELANOCYTIC NEVI: ICD-10-CM

## 2020-11-11 DIAGNOSIS — L57.0 ACTINIC KERATOSIS: ICD-10-CM

## 2020-11-11 PROBLEM — D22.9 MELANOCYTIC NEVI, UNSPECIFIED: Status: ACTIVE | Noted: 2020-11-11

## 2020-11-11 PROBLEM — D18.01 HEMANGIOMA OF SKIN AND SUBCUTANEOUS TISSUE: Status: ACTIVE | Noted: 2020-11-11

## 2020-11-11 PROCEDURE — 17003 DESTRUCT PREMALG LES 2-14: CPT

## 2020-11-11 PROCEDURE — 17000 DESTRUCT PREMALG LESION: CPT

## 2020-11-11 PROCEDURE — ? SUNSCREEN RECOMMENDATIONS

## 2020-11-11 PROCEDURE — ? COUNSELING

## 2020-11-11 PROCEDURE — ? LIQUID NITROGEN

## 2020-11-11 PROCEDURE — 99213 OFFICE O/P EST LOW 20 MIN: CPT | Mod: 25

## 2020-11-11 ASSESSMENT — LOCATION SIMPLE DESCRIPTION DERM
LOCATION SIMPLE: LEFT THIGH
LOCATION SIMPLE: LEFT LIP
LOCATION SIMPLE: RIGHT LIP

## 2020-11-11 ASSESSMENT — LOCATION DETAILED DESCRIPTION DERM
LOCATION DETAILED: LEFT UPPER CUTANEOUS LIP
LOCATION DETAILED: RIGHT UPPER CUTANEOUS LIP
LOCATION DETAILED: LEFT ANTERIOR DISTAL THIGH

## 2020-11-11 ASSESSMENT — LOCATION ZONE DERM
LOCATION ZONE: LIP
LOCATION ZONE: LEG

## 2020-11-11 NOTE — PROCEDURE: LIQUID NITROGEN
Render Note In Bullet Format When Appropriate: No
Number Of Freeze-Thaw Cycles: 2 freeze-thaw cycles
Post-Care Instructions: I reviewed with the patient in detail post-care instructions. Patient is to wear sunprotection, and avoid picking at any of the treated lesions. Pt may apply Vaseline to crusted or scabbing areas.
Detail Level: Simple
Consent: The patient's consent was obtained including but not limited to risks of crusting, scabbing, blistering, scarring, darker or lighter pigmentary change, recurrence, incomplete removal and infection.
Duration Of Freeze Thaw-Cycle (Seconds): 2

## 2020-11-12 ENCOUNTER — HOSPITAL ENCOUNTER (OUTPATIENT)
Dept: LAB | Facility: MEDICAL CENTER | Age: 60
End: 2020-11-12
Attending: NURSE PRACTITIONER
Payer: COMMERCIAL

## 2020-11-12 LAB
ALBUMIN SERPL BCP-MCNC: 4.2 G/DL (ref 3.2–4.9)
ALBUMIN/GLOB SERPL: 1.8 G/DL
ALP SERPL-CCNC: 55 U/L (ref 30–99)
ALT SERPL-CCNC: 14 U/L (ref 2–50)
AMYLASE SERPL-CCNC: 64 U/L (ref 20–103)
ANION GAP SERPL CALC-SCNC: 10 MMOL/L (ref 7–16)
AST SERPL-CCNC: 20 U/L (ref 12–45)
BILIRUB SERPL-MCNC: 0.4 MG/DL (ref 0.1–1.5)
BODY FLD TYPE: NORMAL
BUN SERPL-MCNC: 12 MG/DL (ref 8–22)
CALCIUM SERPL-MCNC: 9 MG/DL (ref 8.5–10.5)
CHLORIDE SERPL-SCNC: 100 MMOL/L (ref 96–112)
CO2 SERPL-SCNC: 27 MMOL/L (ref 20–33)
CREAT SERPL-MCNC: 0.72 MG/DL (ref 0.5–1.4)
FAT FLD QL: NORMAL
GLOBULIN SER CALC-MCNC: 2.4 G/DL (ref 1.9–3.5)
GLUCOSE SERPL-MCNC: 77 MG/DL (ref 65–99)
LIPASE SERPL-CCNC: 47 U/L (ref 11–82)
POTASSIUM SERPL-SCNC: 4 MMOL/L (ref 3.6–5.5)
PROT SERPL-MCNC: 6.6 G/DL (ref 6–8.2)
SODIUM SERPL-SCNC: 137 MMOL/L (ref 135–145)

## 2020-11-12 PROCEDURE — 36415 COLL VENOUS BLD VENIPUNCTURE: CPT

## 2020-11-12 PROCEDURE — 80053 COMPREHEN METABOLIC PANEL: CPT

## 2020-11-12 PROCEDURE — 83690 ASSAY OF LIPASE: CPT

## 2020-11-12 PROCEDURE — 83520 IMMUNOASSAY QUANT NOS NONAB: CPT

## 2020-11-12 PROCEDURE — 83516 IMMUNOASSAY NONANTIBODY: CPT

## 2020-11-12 PROCEDURE — 89125 SPECIMEN FAT STAIN: CPT

## 2020-11-12 PROCEDURE — 82150 ASSAY OF AMYLASE: CPT

## 2020-11-12 PROCEDURE — 82784 ASSAY IGA/IGD/IGG/IGM EACH: CPT

## 2020-11-13 ENCOUNTER — PATIENT MESSAGE (OUTPATIENT)
Dept: MEDICAL GROUP | Facility: PHYSICIAN GROUP | Age: 60
End: 2020-11-13

## 2020-11-14 LAB
IGA SERPL-MCNC: 162 MG/DL (ref 68–408)
TTG IGA SER IA-ACNC: <2 U/ML (ref 0–3)

## 2020-11-16 LAB — ELASTASE PANC STL-MCNT: 173 UG/G

## 2020-11-18 ENCOUNTER — PATIENT MESSAGE (OUTPATIENT)
Dept: MEDICAL GROUP | Facility: PHYSICIAN GROUP | Age: 60
End: 2020-11-18

## 2020-11-18 ENCOUNTER — OFFICE VISIT (OUTPATIENT)
Dept: MEDICAL GROUP | Facility: PHYSICIAN GROUP | Age: 60
End: 2020-11-18
Payer: COMMERCIAL

## 2020-11-18 VITALS
HEIGHT: 66 IN | SYSTOLIC BLOOD PRESSURE: 128 MMHG | DIASTOLIC BLOOD PRESSURE: 78 MMHG | BODY MASS INDEX: 26.42 KG/M2 | HEART RATE: 72 BPM | RESPIRATION RATE: 14 BRPM | WEIGHT: 164.4 LBS | OXYGEN SATURATION: 97 % | TEMPERATURE: 98.8 F

## 2020-11-18 DIAGNOSIS — K52.9 CHRONIC DIARRHEA: ICD-10-CM

## 2020-11-18 DIAGNOSIS — Z23 NEED FOR VACCINATION: ICD-10-CM

## 2020-11-18 DIAGNOSIS — M54.50 CHRONIC MIDLINE LOW BACK PAIN WITHOUT SCIATICA: ICD-10-CM

## 2020-11-18 DIAGNOSIS — G89.29 CHRONIC MIDLINE LOW BACK PAIN WITHOUT SCIATICA: ICD-10-CM

## 2020-11-18 PROCEDURE — 99214 OFFICE O/P EST MOD 30 MIN: CPT | Performed by: INTERNAL MEDICINE

## 2020-11-18 RX ORDER — METRONIDAZOLE 500 MG/1
TABLET ORAL
COMMUNITY
Start: 2020-10-28 | End: 2020-11-17

## 2020-11-18 RX ORDER — PANCRELIPASE 24000; 76000; 120000 [USP'U]/1; [USP'U]/1; [USP'U]/1
CAPSULE, DELAYED RELEASE PELLETS ORAL
COMMUNITY
Start: 2020-11-05 | End: 2020-11-17

## 2020-11-18 RX ORDER — CELECOXIB 100 MG/1
100 CAPSULE ORAL 2 TIMES DAILY
Qty: 60 CAP | Refills: 1 | Status: SHIPPED | OUTPATIENT
Start: 2020-11-18 | End: 2021-01-07

## 2020-11-18 RX ORDER — AZITHROMYCIN 250 MG/1
TABLET, FILM COATED ORAL
COMMUNITY
Start: 2020-10-16 | End: 2020-11-17

## 2020-11-18 ASSESSMENT — FIBROSIS 4 INDEX: FIB4 SCORE: 1.29
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
## 2020-11-26 ENCOUNTER — APPOINTMENT (OUTPATIENT)
Dept: RADIOLOGY | Facility: MEDICAL CENTER | Age: 60
End: 2020-11-26
Attending: INTERNAL MEDICINE
Payer: COMMERCIAL

## 2020-12-18 ENCOUNTER — HOSPITAL ENCOUNTER (OUTPATIENT)
Dept: RADIOLOGY | Facility: MEDICAL CENTER | Age: 60
End: 2020-12-18
Attending: FAMILY MEDICINE
Payer: COMMERCIAL

## 2020-12-18 DIAGNOSIS — Z12.31 ENCOUNTER FOR MAMMOGRAM TO ESTABLISH BASELINE MAMMOGRAM: ICD-10-CM

## 2020-12-18 PROCEDURE — 77067 SCR MAMMO BI INCL CAD: CPT

## 2020-12-22 ENCOUNTER — APPOINTMENT (OUTPATIENT)
Dept: RADIOLOGY | Facility: MEDICAL CENTER | Age: 60
End: 2020-12-22
Attending: INTERNAL MEDICINE
Payer: COMMERCIAL

## 2021-01-11 RX ORDER — LEVOTHYROXINE SODIUM 88 UG/1
88 TABLET ORAL
Qty: 90 TAB | Refills: 0 | Status: SHIPPED | OUTPATIENT
Start: 2021-01-11 | End: 2021-01-12 | Stop reason: SDUPTHER

## 2021-01-12 ENCOUNTER — HOSPITAL ENCOUNTER (OUTPATIENT)
Dept: LAB | Facility: MEDICAL CENTER | Age: 61
End: 2021-01-12
Attending: PHYSICIAN ASSISTANT
Payer: COMMERCIAL

## 2021-01-12 ENCOUNTER — TELEMEDICINE (OUTPATIENT)
Dept: MEDICAL GROUP | Facility: PHYSICIAN GROUP | Age: 61
End: 2021-01-12
Payer: COMMERCIAL

## 2021-01-12 VITALS — WEIGHT: 164 LBS | HEIGHT: 66 IN | RESPIRATION RATE: 12 BRPM | BODY MASS INDEX: 26.36 KG/M2

## 2021-01-12 DIAGNOSIS — E03.9 ACQUIRED HYPOTHYROIDISM: ICD-10-CM

## 2021-01-12 DIAGNOSIS — Z79.899 HIGH RISK MEDICATION USE: ICD-10-CM

## 2021-01-12 DIAGNOSIS — R92.30 DENSE BREAST: ICD-10-CM

## 2021-01-12 DIAGNOSIS — N95.1 MENOPAUSAL SYMPTOM: ICD-10-CM

## 2021-01-12 DIAGNOSIS — M19.90 ARTHRITIS: ICD-10-CM

## 2021-01-12 DIAGNOSIS — R92.2 DENSE BREAST: ICD-10-CM

## 2021-01-12 DIAGNOSIS — B00.1 HERPES LABIALIS: ICD-10-CM

## 2021-01-12 DIAGNOSIS — F41.1 ANXIETY REACTION: ICD-10-CM

## 2021-01-12 LAB
ALBUMIN SERPL BCP-MCNC: 4.2 G/DL (ref 3.2–4.9)
ALBUMIN/GLOB SERPL: 1.8 G/DL
ALP SERPL-CCNC: 63 U/L (ref 30–99)
ALT SERPL-CCNC: 18 U/L (ref 2–50)
ANION GAP SERPL CALC-SCNC: 8 MMOL/L (ref 7–16)
AST SERPL-CCNC: 19 U/L (ref 12–45)
BILIRUB SERPL-MCNC: 0.2 MG/DL (ref 0.1–1.5)
BUN SERPL-MCNC: 13 MG/DL (ref 8–22)
CALCIUM SERPL-MCNC: 9.5 MG/DL (ref 8.5–10.5)
CHLORIDE SERPL-SCNC: 102 MMOL/L (ref 96–112)
CO2 SERPL-SCNC: 27 MMOL/L (ref 20–33)
CREAT SERPL-MCNC: 0.77 MG/DL (ref 0.5–1.4)
FASTING STATUS PATIENT QL REPORTED: NORMAL
GLOBULIN SER CALC-MCNC: 2.3 G/DL (ref 1.9–3.5)
GLUCOSE SERPL-MCNC: 99 MG/DL (ref 65–99)
POTASSIUM SERPL-SCNC: 4.9 MMOL/L (ref 3.6–5.5)
PROT SERPL-MCNC: 6.5 G/DL (ref 6–8.2)
SODIUM SERPL-SCNC: 137 MMOL/L (ref 135–145)
T3FREE SERPL-MCNC: 2.84 PG/ML (ref 2–4.4)
T4 FREE SERPL-MCNC: 1.24 NG/DL (ref 0.93–1.7)
TSH SERPL DL<=0.005 MIU/L-ACNC: 0.1 UIU/ML (ref 0.38–5.33)

## 2021-01-12 PROCEDURE — 84443 ASSAY THYROID STIM HORMONE: CPT

## 2021-01-12 PROCEDURE — 84439 ASSAY OF FREE THYROXINE: CPT

## 2021-01-12 PROCEDURE — 80053 COMPREHEN METABOLIC PANEL: CPT

## 2021-01-12 PROCEDURE — 36415 COLL VENOUS BLD VENIPUNCTURE: CPT

## 2021-01-12 PROCEDURE — 99214 OFFICE O/P EST MOD 30 MIN: CPT | Performed by: PHYSICIAN ASSISTANT

## 2021-01-12 PROCEDURE — 84481 FREE ASSAY (FT-3): CPT

## 2021-01-12 RX ORDER — VALACYCLOVIR HYDROCHLORIDE 1 G/1
TABLET, FILM COATED ORAL
Qty: 90 TAB | Refills: 3 | Status: SHIPPED | OUTPATIENT
Start: 2021-01-12 | End: 2022-02-18

## 2021-01-12 RX ORDER — VALACYCLOVIR HYDROCHLORIDE 1 G/1
TABLET, FILM COATED ORAL
Qty: 90 TAB | Refills: 3 | Status: SHIPPED | OUTPATIENT
Start: 2021-01-12 | End: 2021-01-12 | Stop reason: SDUPTHER

## 2021-01-12 RX ORDER — CELECOXIB 200 MG/1
200 CAPSULE ORAL 2 TIMES DAILY
Qty: 180 CAP | Refills: 1 | Status: SHIPPED | OUTPATIENT
Start: 2021-01-12 | End: 2021-07-17

## 2021-01-12 RX ORDER — LEVOTHYROXINE SODIUM 88 UG/1
88 TABLET ORAL
Qty: 90 TAB | Refills: 1 | Status: SHIPPED | OUTPATIENT
Start: 2021-01-12 | End: 2021-01-13

## 2021-01-12 ASSESSMENT — FIBROSIS 4 INDEX: FIB4 SCORE: 1.29

## 2021-01-12 ASSESSMENT — PATIENT HEALTH QUESTIONNAIRE - PHQ9: CLINICAL INTERPRETATION OF PHQ2 SCORE: 0

## 2021-01-12 NOTE — ASSESSMENT & PLAN NOTE
On Valtrex daily, works well prophylactically. Before she went on it, she was having out break 4-5 times a year, she tried to wean off it, and 5 days later had outbreak.

## 2021-01-12 NOTE — ASSESSMENT & PLAN NOTE
This is a  chronic condition.   Onset: years ago  Last TSH level:   TSH   Date Value Ref Range Status   07/22/2020 0.759 0.380 - 5.330 uIU/mL Final     Comment:     Please note new reference ranges effective 12/14/2017 10:00 AM  Pregnant Females, 1st Trimester  0.050-3.700  Pregnant Females, 2nd Trimester  0.310-4.350  Pregnant Females, 3rd Trimester  0.410-5.180       Current Medication: levothyroxine 88 mcg  Side effects to medication: none  Denies any chronic fatigue, constipation, dry skin, weight gain, heat/cold intolerance, or hair loss.
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
## 2021-01-12 NOTE — ASSESSMENT & PLAN NOTE
Chronic condition, on name brand effexor  mg. Stable on this medication. Tried generic in past and ineffective. No counselors currently. Stems around taking care of her .   Failed wellbutrin in past.
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
## 2021-01-12 NOTE — ASSESSMENT & PLAN NOTE
I have spoken to patient's referring [facility] and Retina doctor Dr. Edouard Chase.  Dr. Selam Rothman is willing to see the patient tomorrow AM after suregery for post op 1 appointment and retina evaluation (after open globe repair). Patient is on Biest: C-E2/E3 25 mg/ml Take 0.1 ml SL daily  and progesterone cream c-progesteron 60mc/gm cr 4 clicks. Skips Sunday. Tried to wean a few months ago and did not tolerate this. Went back on her dosage. Was going to Dr. Frias in past. Had uterine ablation, and no issues bleeding since. Dr. Koehler was following previously paps and normal. Previous paps normal.     No FH uterine cancer. Dad's sister and cousin had breast cancer. No primary relatives. FH dad- stroke older years 88.

## 2021-01-13 ENCOUNTER — TELEPHONE (OUTPATIENT)
Dept: MEDICAL GROUP | Facility: PHYSICIAN GROUP | Age: 61
End: 2021-01-13

## 2021-01-13 DIAGNOSIS — E03.9 ACQUIRED HYPOTHYROIDISM: ICD-10-CM

## 2021-01-13 RX ORDER — LEVOTHYROXINE SODIUM 0.07 MG/1
75 TABLET ORAL
Qty: 30 TAB | Refills: 2 | Status: SHIPPED | OUTPATIENT
Start: 2021-01-13 | End: 2021-03-08

## 2021-01-13 NOTE — TELEPHONE ENCOUNTER
----- Message from Yulisa Arcos P.A.-C. sent at 1/13/2021 11:23 AM PST -----  Please call patient about their results.     Results showed: Your TSH is too low indicating you are on too much medication.  We need to decrease your dosage.  I will reduce your levothyroxine from 88 mcg to 75 mcg.  I will call in a new prescription and a lab order to repeat in the next 6 to 8 weeks.  The rest your labs look normal.  We will discuss them in further detail at your next follow-up visit.    If you have any questions or concerns, do not hesitate to contact me or my Medical Assistant. Thank you for your time today.     Respectfully,     Yulisa Arcos PA-C

## 2021-01-13 NOTE — PROGRESS NOTES
On patient's most recent labs her TSH is too low will reduce dosage from 88 mcg to 75 mcg.  Will call in prescription and a new lab order to get in 6 to 8 weeks.

## 2021-01-20 ENCOUNTER — APPOINTMENT (OUTPATIENT)
Dept: RADIOLOGY | Facility: MEDICAL CENTER | Age: 61
End: 2021-01-20
Attending: PHYSICIAN ASSISTANT
Payer: COMMERCIAL

## 2021-01-20 DIAGNOSIS — R92.2 DENSE BREAST: ICD-10-CM

## 2021-01-20 DIAGNOSIS — R92.30 DENSE BREAST: ICD-10-CM

## 2021-01-20 PROCEDURE — 76641 ULTRASOUND BREAST COMPLETE: CPT

## 2021-02-09 RX ORDER — LEVOTHYROXINE SODIUM 0.07 MG/1
TABLET ORAL
Qty: 30 TAB | Refills: 2 | OUTPATIENT
Start: 2021-02-09

## 2021-02-09 NOTE — TELEPHONE ENCOUNTER
Last seen by PCP 1/21/2021.     TSH   Date Value Ref Range Status   01/12/2021 0.099 (L) 0.380 - 5.330 uIU/mL Final     Comment:     Please note new reference ranges effective 12/14/2017 10:00 AM  Pregnant Females, 1st Trimester  0.050-3.700  Pregnant Females, 2nd Trimester  0.310-4.350  Pregnant Females, 3rd Trimester  0.410-5.180     Dose lowered after.  Patient was given 3 month supply and needed to get labs done. Should still have some.

## 2021-03-08 RX ORDER — LEVOTHYROXINE SODIUM 0.07 MG/1
TABLET ORAL
Qty: 30 TABLET | Refills: 0 | Status: SHIPPED | OUTPATIENT
Start: 2021-03-08 | End: 2021-04-02

## 2021-03-10 ENCOUNTER — APPOINTMENT (RX ONLY)
Dept: URBAN - METROPOLITAN AREA CLINIC 4 | Facility: CLINIC | Age: 61
Setting detail: DERMATOLOGY
End: 2021-03-10

## 2021-03-10 DIAGNOSIS — L82.1 OTHER SEBORRHEIC KERATOSIS: ICD-10-CM

## 2021-03-10 DIAGNOSIS — L82.0 INFLAMED SEBORRHEIC KERATOSIS: ICD-10-CM

## 2021-03-10 PROBLEM — D48.5 NEOPLASM OF UNCERTAIN BEHAVIOR OF SKIN: Status: ACTIVE | Noted: 2021-03-10

## 2021-03-10 PROCEDURE — ? BIOPSY BY SHAVE METHOD

## 2021-03-10 PROCEDURE — 11102 TANGNTL BX SKIN SINGLE LES: CPT | Mod: 59

## 2021-03-10 PROCEDURE — ? LIQUID NITROGEN

## 2021-03-10 PROCEDURE — 17110 DESTRUCTION B9 LES UP TO 14: CPT

## 2021-03-10 ASSESSMENT — LOCATION ZONE DERM
LOCATION ZONE: FACE
LOCATION ZONE: TRUNK

## 2021-03-10 ASSESSMENT — LOCATION DETAILED DESCRIPTION DERM
LOCATION DETAILED: LEFT INFERIOR MEDIAL UPPER BACK
LOCATION DETAILED: LEFT INFERIOR MEDIAL FOREHEAD

## 2021-03-10 ASSESSMENT — LOCATION SIMPLE DESCRIPTION DERM
LOCATION SIMPLE: LEFT UPPER BACK
LOCATION SIMPLE: LEFT FOREHEAD

## 2021-03-10 NOTE — TELEPHONE ENCOUNTER
----- Message from Lizy Saleem sent at 3/10/2021  9:48 AM PST -----  Regarding: Prescription Question  Contact: [phone]  Hi,  Would you please refill my attached Bi-Est prescription with [facility] pharmacy? They said they sent messages this week and I also left a message with your medical assistant. I’m out of this because the top broke and I lost some of the liquid.  Thanks a lot!! Lizy Saleem

## 2021-03-15 ENCOUNTER — HOSPITAL ENCOUNTER (OUTPATIENT)
Dept: LAB | Facility: MEDICAL CENTER | Age: 61
End: 2021-03-15
Attending: PHYSICIAN ASSISTANT
Payer: COMMERCIAL

## 2021-03-15 DIAGNOSIS — E03.9 ACQUIRED HYPOTHYROIDISM: ICD-10-CM

## 2021-03-15 DIAGNOSIS — Z23 NEED FOR VACCINATION: ICD-10-CM

## 2021-03-15 LAB
T3FREE SERPL-MCNC: 2.45 PG/ML (ref 2–4.4)
T4 FREE SERPL-MCNC: 1.08 NG/DL (ref 0.93–1.7)
TSH SERPL DL<=0.005 MIU/L-ACNC: 0.94 UIU/ML (ref 0.38–5.33)

## 2021-03-15 PROCEDURE — 36415 COLL VENOUS BLD VENIPUNCTURE: CPT

## 2021-03-15 PROCEDURE — 84439 ASSAY OF FREE THYROXINE: CPT

## 2021-03-15 PROCEDURE — 84481 FREE ASSAY (FT-3): CPT

## 2021-03-15 PROCEDURE — 84443 ASSAY THYROID STIM HORMONE: CPT

## 2021-03-16 ENCOUNTER — TELEPHONE (OUTPATIENT)
Dept: MEDICAL GROUP | Facility: PHYSICIAN GROUP | Age: 61
End: 2021-03-16

## 2021-03-30 ENCOUNTER — TELEMEDICINE (OUTPATIENT)
Dept: MEDICAL GROUP | Facility: PHYSICIAN GROUP | Age: 61
End: 2021-03-30
Payer: COMMERCIAL

## 2021-03-30 VITALS — WEIGHT: 164 LBS | RESPIRATION RATE: 14 BRPM | HEIGHT: 66 IN | BODY MASS INDEX: 26.36 KG/M2

## 2021-03-30 DIAGNOSIS — M54.50 CHRONIC MIDLINE LOW BACK PAIN WITHOUT SCIATICA: ICD-10-CM

## 2021-03-30 DIAGNOSIS — G89.29 CHRONIC MIDLINE LOW BACK PAIN WITHOUT SCIATICA: ICD-10-CM

## 2021-03-30 PROCEDURE — 99213 OFFICE O/P EST LOW 20 MIN: CPT | Mod: 95,CR | Performed by: PHYSICIAN ASSISTANT

## 2021-03-30 ASSESSMENT — FIBROSIS 4 INDEX: FIB4 SCORE: 1.08
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
## 2021-03-31 ENCOUNTER — HOSPITAL ENCOUNTER (OUTPATIENT)
Dept: RADIOLOGY | Facility: MEDICAL CENTER | Age: 61
End: 2021-03-31
Attending: PHYSICIAN ASSISTANT
Payer: COMMERCIAL

## 2021-03-31 DIAGNOSIS — G89.29 CHRONIC MIDLINE LOW BACK PAIN WITHOUT SCIATICA: ICD-10-CM

## 2021-03-31 DIAGNOSIS — M54.50 CHRONIC MIDLINE LOW BACK PAIN WITHOUT SCIATICA: ICD-10-CM

## 2021-03-31 PROCEDURE — 72100 X-RAY EXAM L-S SPINE 2/3 VWS: CPT

## 2021-04-01 ENCOUNTER — PATIENT MESSAGE (OUTPATIENT)
Dept: MEDICAL GROUP | Facility: PHYSICIAN GROUP | Age: 61
End: 2021-04-01

## 2021-04-01 ENCOUNTER — TELEPHONE (OUTPATIENT)
Dept: MEDICAL GROUP | Facility: PHYSICIAN GROUP | Age: 61
End: 2021-04-01

## 2021-04-01 DIAGNOSIS — G89.29 CHRONIC MIDLINE LOW BACK PAIN WITHOUT SCIATICA: ICD-10-CM

## 2021-04-01 DIAGNOSIS — M54.50 CHRONIC MIDLINE LOW BACK PAIN WITHOUT SCIATICA: ICD-10-CM

## 2021-04-01 NOTE — TELEPHONE ENCOUNTER
----- Message from Yulisa Arcos P.A.-C. sent at 4/1/2021 10:55 AM PDT -----  Please call patient about their results.     Results showed: X-ray did show degenerative disc disease most significant at L2-L3.  We will continue with conservative care, and review the results at your follow-up visit.    Thank you,    Yulisa Arcos PA-C

## 2021-04-02 RX ORDER — LEVOTHYROXINE SODIUM 0.07 MG/1
TABLET ORAL
Qty: 90 TABLET | Refills: 3 | Status: SHIPPED | OUTPATIENT
Start: 2021-04-02 | End: 2022-05-02 | Stop reason: SDUPTHER

## 2021-04-02 NOTE — TELEPHONE ENCOUNTER
Last seen by PCP 03/30/2021.     TSH   Date Value Ref Range Status   03/15/2021 0.940 0.380 - 5.330 uIU/mL Final     Comment:     Please note new reference ranges effective 12/14/2017 10:00 AM  Pregnant Females, 1st Trimester  0.050-3.700  Pregnant Females, 2nd Trimester  0.310-4.350  Pregnant Females, 3rd Trimester  0.410-5.180

## 2021-04-06 ENCOUNTER — IMMUNIZATION (OUTPATIENT)
Dept: FAMILY PLANNING/WOMEN'S HEALTH CLINIC | Facility: IMMUNIZATION CENTER | Age: 61
End: 2021-04-06
Attending: INTERNAL MEDICINE
Payer: COMMERCIAL

## 2021-04-06 DIAGNOSIS — Z23 ENCOUNTER FOR VACCINATION: Primary | ICD-10-CM

## 2021-04-06 DIAGNOSIS — Z23 NEED FOR VACCINATION: ICD-10-CM

## 2021-04-06 PROCEDURE — 91300 PFIZER SARS-COV-2 VACCINE: CPT

## 2021-04-06 PROCEDURE — 0001A PFIZER SARS-COV-2 VACCINE: CPT

## 2021-04-14 ENCOUNTER — APPOINTMENT (OUTPATIENT)
Dept: RADIOLOGY | Facility: MEDICAL CENTER | Age: 61
End: 2021-04-14
Attending: PHYSICIAN ASSISTANT
Payer: COMMERCIAL

## 2021-04-18 ENCOUNTER — APPOINTMENT (OUTPATIENT)
Dept: RADIOLOGY | Facility: MEDICAL CENTER | Age: 61
End: 2021-04-18
Attending: PHYSICIAN ASSISTANT
Payer: COMMERCIAL

## 2021-04-29 ENCOUNTER — IMMUNIZATION (OUTPATIENT)
Dept: FAMILY PLANNING/WOMEN'S HEALTH CLINIC | Facility: IMMUNIZATION CENTER | Age: 61
End: 2021-04-29
Payer: COMMERCIAL

## 2021-04-29 DIAGNOSIS — Z23 ENCOUNTER FOR VACCINATION: Primary | ICD-10-CM

## 2021-04-29 PROCEDURE — 91300 PFIZER SARS-COV-2 VACCINE: CPT

## 2021-04-29 PROCEDURE — 0002A PFIZER SARS-COV-2 VACCINE: CPT

## 2021-05-02 ENCOUNTER — HOSPITAL ENCOUNTER (OUTPATIENT)
Dept: RADIOLOGY | Facility: MEDICAL CENTER | Age: 61
End: 2021-05-02
Attending: PHYSICIAN ASSISTANT
Payer: COMMERCIAL

## 2021-05-02 DIAGNOSIS — M54.50 CHRONIC MIDLINE LOW BACK PAIN WITHOUT SCIATICA: ICD-10-CM

## 2021-05-02 DIAGNOSIS — G89.29 CHRONIC MIDLINE LOW BACK PAIN WITHOUT SCIATICA: ICD-10-CM

## 2021-05-02 PROCEDURE — 72148 MRI LUMBAR SPINE W/O DYE: CPT

## 2021-05-03 ENCOUNTER — PATIENT MESSAGE (OUTPATIENT)
Dept: MEDICAL GROUP | Facility: PHYSICIAN GROUP | Age: 61
End: 2021-05-03

## 2021-05-03 ENCOUNTER — TELEPHONE (OUTPATIENT)
Dept: MEDICAL GROUP | Facility: PHYSICIAN GROUP | Age: 61
End: 2021-05-03

## 2021-05-03 DIAGNOSIS — Z11.59 SCREENING FOR VIRAL DISEASE: ICD-10-CM

## 2021-05-03 NOTE — PATIENT COMMUNICATION
Pt is having some symptoms 4 days post covid vaccine an is requesting a covid test order just in case.

## 2021-05-03 NOTE — TELEPHONE ENCOUNTER
Called patient and relayed the message below. Patient said she wasn't feeling very well this morning and would like to call back to make the follow up. I let her know she could absolutely do that and she can also contact us via [employer] message.     Pt thanked me for the call.

## 2021-05-03 NOTE — TELEPHONE ENCOUNTER
----- Message from Lizy Saleem sent at 5/3/2021  3:22 PM PDT -----  Regarding: RE: Test Result Question  Contact: [phone]  Would it be ok to go ahead & order the Covid test? Would [facility] to schedule & get at the drive-through at [facility]? How long is it taking to get the results?   Since April 29th, I felt perfectly normal, until this morning (4 days after the 2nd vaccine). Guess it wouldn’t hurt to have the order in just in case. I do have a 101.3 temp. Again, thanks for being there to help me.

## 2021-05-03 NOTE — TELEPHONE ENCOUNTER
----- Message from Yulisa Arocs P.A.-C. sent at 5/3/2021  6:31 AM PDT -----  Please call patient about their labs.     There are a few things on their imaging  I'd like to talk about. Please schedule a follow up.     Thank you,    Yulisa Arcos P.A.-C.

## 2021-05-04 ENCOUNTER — HOSPITAL ENCOUNTER (OUTPATIENT)
Facility: MEDICAL CENTER | Age: 61
End: 2021-05-04
Attending: PHYSICIAN ASSISTANT
Payer: COMMERCIAL

## 2021-05-04 DIAGNOSIS — Z11.59 SCREENING FOR VIRAL DISEASE: ICD-10-CM

## 2021-05-04 NOTE — ASSESSMENT & PLAN NOTE
MRI lumbar spine reviewed with patient today:     IMPRESSION:     1.  Severe discal and endplate degenerative changes at the L2-3 level.     2.  Mild lumbar spondylotic changes at the L2-3 and L4-5 levels.     3.  Mild central canal stenosis at the L2-3 and L4-5 levels.    She is overall feeling better since last visit. Hasn't embarked on PT yet. Wanted to wait until we discussed the MRI first. She is taking celebrex 200 mg once a day for pain.

## 2021-05-05 ENCOUNTER — TELEPHONE (OUTPATIENT)
Dept: MEDICAL GROUP | Facility: PHYSICIAN GROUP | Age: 61
End: 2021-05-05

## 2021-05-05 DIAGNOSIS — Z20.822 CLOSE EXPOSURE TO COVID-19 VIRUS: ICD-10-CM

## 2021-05-05 DIAGNOSIS — Z11.59 SCREENING FOR VIRAL DISEASE: ICD-10-CM

## 2021-05-06 ENCOUNTER — TELEMEDICINE (OUTPATIENT)
Dept: MEDICAL GROUP | Facility: PHYSICIAN GROUP | Age: 61
End: 2021-05-06
Payer: COMMERCIAL

## 2021-05-06 VITALS — HEIGHT: 66 IN | BODY MASS INDEX: 26.36 KG/M2 | RESPIRATION RATE: 12 BRPM | WEIGHT: 164 LBS

## 2021-05-06 DIAGNOSIS — E03.9 ACQUIRED HYPOTHYROIDISM: ICD-10-CM

## 2021-05-06 DIAGNOSIS — Z13.0 SCREENING FOR DEFICIENCY ANEMIA: ICD-10-CM

## 2021-05-06 DIAGNOSIS — M54.50 CHRONIC MIDLINE LOW BACK PAIN WITHOUT SCIATICA: ICD-10-CM

## 2021-05-06 DIAGNOSIS — K52.9 CHRONIC DIARRHEA: ICD-10-CM

## 2021-05-06 DIAGNOSIS — G89.29 CHRONIC MIDLINE LOW BACK PAIN WITHOUT SCIATICA: ICD-10-CM

## 2021-05-06 DIAGNOSIS — Z13.6 SCREENING FOR CARDIOVASCULAR CONDITION: ICD-10-CM

## 2021-05-06 PROCEDURE — 99213 OFFICE O/P EST LOW 20 MIN: CPT | Mod: 95 | Performed by: PHYSICIAN ASSISTANT

## 2021-05-06 ASSESSMENT — FIBROSIS 4 INDEX: FIB4 SCORE: 1.08

## 2021-05-06 NOTE — PROGRESS NOTES
Virtual Visit: Established Patient   This visit was conducted via Zoom using secure and encrypted videoconferencing technology. The patient was in a private location in the state of [place].    The patient's identity was confirmed and verbal consent was obtained for this virtual visit.    Subjective:   CC:   Chief Complaint   Patient presents with   • Results     MRI       Lizy Saleem is a 60 y.o. female presenting for evaluation and management of:    Health Maintenance: completed    Chronic midline low back pain without sciatica  MRI lumbar spine reviewed with patient today:     IMPRESSION:     1.  Severe discal and endplate degenerative changes at the L2-3 level.     2.  Mild lumbar spondylotic changes at the L2-3 and L4-5 levels.     3.  Mild central canal stenosis at the L2-3 and L4-5 levels.    She is overall feeling better since last visit. Hasn't embarked on PT yet. Wanted to wait until we discussed the MRI first. She is taking celebrex 200 mg once a day for pain.     Chronic diarrhea  Just saw GI doctor this morning. She is doing another SIBO test kit, may do another round Xifaxin to treat. Worked previously for a couple weeks.       ROS   Denies any recent fevers or chills. No nausea or vomiting. No chest pains or shortness of breath.     No Active Allergies    Current medicines (including changes today)  Current Outpatient Medications   Medication Sig Dispense Refill   • levothyroxine (SYNTHROID) 75 MCG Tab TAKE 1 TABLET BY MOUTH EVERY MORNING ON AN EMPTY STOMACH 90 tablet 3   • NON SPECIFIED C-E2/E3 25 mg/mlTake 0.1ml SL daily 5 Each 1   • celecoxib (CELEBREX) 200 MG Cap Take 1 Cap by mouth 2 times a day. 180 Cap 1   • EFFEXOR  MG extended-release capsule Take 1 Cap by mouth every day. 90 Cap 3   • Misc Natural Products (PROGESTERONE EX) Apply  topically. c-progesteron 60 mc/gram, 4 clicks nightly     • valacyclovir (VALTREX) 1 GM Tab 1 tab PO once a day 90 Tab 3   • Non Formulary  "Request C-progesterone 60 mg/gm CR - apply 4 clicks topically daily 90 Each 3     No current facility-administered medications for this visit.       Patient Active Problem List    Diagnosis Date Noted   • Arthritis 01/12/2021   • Chronic diarrhea 11/18/2020   • Chronic midline low back pain without sciatica 11/18/2020   • Well woman exam with routine gynecological exam 07/17/2019   • Herpes labialis 02/08/2019   • Family history of abdominal aortic aneurysm 02/08/2019   • Evans's neuroma of left foot 11/08/2018   • Anxiety reaction 11/01/2018   • Acquired hypothyroidism 11/01/2018   • Menopausal symptom 11/01/2018   • Health care maintenance 11/01/2018   • Other sleep apnea 11/01/2018   • Restless legs 11/01/2018   • Family history of malignant neoplasm of breast 11/17/2017       Family History   Problem Relation Age of Onset   • Breast Cancer Paternal Aunt    • Cancer Father    • Hyperlipidemia Father    • Hypertension Father    • Other Father         multiple aneurisms   • Thyroid Mother    • Cancer Mother         melanoma skin and eye   • Lung Disease Mother    • Cancer Cousin        She  has a past medical history of Acquired hypothyroidism (11/1/2018), Anesthesia, Anxiety reaction (11/1/2018), Chronic diarrhea (11/18/2020), Chronic midline low back pain without sciatica (11/18/2020), Family history of abdominal aortic aneurysm (2/8/2019), Herpes labialis (2/8/2019), and Evans's neuroma of left foot (11/8/2018).  She  has a past surgical history that includes bunionectomy (Left, 2000); bunionectomy (Right, 2003); arthroscopy, knee (Left, 1992); hysteroscopy with video diagnostic (10/23/2015); and dilation and curettage (10/23/2015).       Objective:   Resp 12   Ht 1.676 m (5' 6\")   Wt 74.4 kg (164 lb)   BMI 26.47 kg/m²     Physical Exam:  Constitutional: Alert, no distress, well-groomed.  Skin: No rashes in visible areas.  Eye: Round. Conjunctiva clear, lids normal. No icterus.   ENMT: Lips pink without " lesions, good dentition, moist mucous membranes. Phonation normal.  Neck: No masses, no thyromegaly. Moves freely without pain.  Respiratory: Unlabored respiratory effort, no cough or audible wheeze  Psych: Alert and oriented x3, normal affect and mood.       Assessment and Plan:   The following treatment plan was discussed:     1. Chronic midline low back pain without sciatica  We will continue with conservative therapy.  Would like patient to try physical therapy to see if this helps improve her symptoms or reduce her risk for exasperation.  If it is ineffective or if her back pain worsens would recommend referral to physiatry.  Patient will continue Celebrex 200 mg daily as needed.  2. Chronic diarrhea  Followed by gastro.  Discussed with patient may be a trial of FODMAP diet.  We will send her information on this.  She is getting tested for SIBO and may get treated with Xifaxan again.  Currently she is doing a keto diet.  She has tried elimination diet in the past it was ineffective.  3. Acquired hypothyroidism  - TSH; Future  - T3 FREE; Future  - FREE THYROXINE; Future  Chronic condition, patient continues levothyroxine 75 mcg daily.  Due for updated labs September 2021.  4. Screening for deficiency anemia  - CBC WITH DIFFERENTIAL; Future    5. Screening for cardiovascular condition  - Comp Metabolic Panel; Future  - Lipid Profile; Future         Follow-up: Return in about 4 months (around 9/6/2021) for Follow up on labs.        The patient verbalized agreement and understanding of current plan. All questions and concerns were addressed at time of visit.    Please note that this dictation was created using voice recognition software. I have made every reasonable attempt to correct obvious errors, but I expect that there are errors of grammar and possibly content that I did not discover before finalizing the note.

## 2021-05-06 NOTE — ASSESSMENT & PLAN NOTE
Just saw GI doctor this morning. She is doing another SIBO test kit, may do another round Xifaxin to treat. Worked previously for a couple weeks.

## 2021-05-12 ENCOUNTER — TELEPHONE (OUTPATIENT)
Dept: MEDICAL GROUP | Facility: PHYSICIAN GROUP | Age: 61
End: 2021-05-12

## 2021-05-12 NOTE — TELEPHONE ENCOUNTER
I spoke with Lizy.    Lizy advised that she can not sent other patients information through [facility].
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
## 2021-06-16 ENCOUNTER — APPOINTMENT (RX ONLY)
Dept: URBAN - METROPOLITAN AREA CLINIC 4 | Facility: CLINIC | Age: 61
Setting detail: DERMATOLOGY
End: 2021-06-16

## 2021-06-16 DIAGNOSIS — L81.4 OTHER MELANIN HYPERPIGMENTATION: ICD-10-CM

## 2021-06-16 DIAGNOSIS — D18.0 HEMANGIOMA: ICD-10-CM

## 2021-06-16 DIAGNOSIS — Z71.89 OTHER SPECIFIED COUNSELING: ICD-10-CM

## 2021-06-16 DIAGNOSIS — L57.0 ACTINIC KERATOSIS: ICD-10-CM

## 2021-06-16 DIAGNOSIS — L82.1 OTHER SEBORRHEIC KERATOSIS: ICD-10-CM

## 2021-06-16 DIAGNOSIS — D22 MELANOCYTIC NEVI: ICD-10-CM

## 2021-06-16 DIAGNOSIS — L82.0 INFLAMED SEBORRHEIC KERATOSIS: ICD-10-CM

## 2021-06-16 PROBLEM — D22.9 MELANOCYTIC NEVI, UNSPECIFIED: Status: ACTIVE | Noted: 2021-06-16

## 2021-06-16 PROBLEM — D18.01 HEMANGIOMA OF SKIN AND SUBCUTANEOUS TISSUE: Status: ACTIVE | Noted: 2021-06-16

## 2021-06-16 PROCEDURE — ? COUNSELING

## 2021-06-16 PROCEDURE — 17000 DESTRUCT PREMALG LESION: CPT | Mod: 59

## 2021-06-16 PROCEDURE — 99213 OFFICE O/P EST LOW 20 MIN: CPT | Mod: 25

## 2021-06-16 PROCEDURE — 17110 DESTRUCTION B9 LES UP TO 14: CPT

## 2021-06-16 PROCEDURE — ? SUNSCREEN RECOMMENDATIONS

## 2021-06-16 PROCEDURE — ? LIQUID NITROGEN

## 2021-06-16 ASSESSMENT — LOCATION SIMPLE DESCRIPTION DERM
LOCATION SIMPLE: LEFT EYEBROW
LOCATION SIMPLE: NECK
LOCATION SIMPLE: RIGHT FOREHEAD
LOCATION SIMPLE: RIGHT CHEEK
LOCATION SIMPLE: RIGHT HAND
LOCATION SIMPLE: LEFT CHEEK
LOCATION SIMPLE: SUPERIOR FOREHEAD

## 2021-06-16 ASSESSMENT — LOCATION DETAILED DESCRIPTION DERM
LOCATION DETAILED: LEFT CENTRAL LATERAL NECK
LOCATION DETAILED: LEFT LATERAL EYEBROW
LOCATION DETAILED: RIGHT LATERAL SUBMANDIBULAR CHEEK
LOCATION DETAILED: LEFT INFERIOR LATERAL MALAR CHEEK
LOCATION DETAILED: LEFT CENTRAL EYEBROW
LOCATION DETAILED: RIGHT CENTRAL BUCCAL CHEEK
LOCATION DETAILED: RIGHT SUPERIOR FOREHEAD
LOCATION DETAILED: RIGHT LATERAL FOREHEAD
LOCATION DETAILED: RIGHT INFERIOR CENTRAL MALAR CHEEK
LOCATION DETAILED: LEFT SUPERIOR CENTRAL BUCCAL CHEEK
LOCATION DETAILED: RIGHT RADIAL DORSAL HAND
LOCATION DETAILED: RIGHT MEDIAL FOREHEAD
LOCATION DETAILED: SUPERIOR MID FOREHEAD

## 2021-06-16 ASSESSMENT — LOCATION ZONE DERM
LOCATION ZONE: FACE
LOCATION ZONE: HAND
LOCATION ZONE: NECK

## 2021-06-16 NOTE — PROCEDURE: LIQUID NITROGEN
Number Of Freeze-Thaw Cycles: 2 freeze-thaw cycles
Consent: The patient's consent was obtained including but not limited to risks of crusting, scabbing, blistering, scarring, darker or lighter pigmentary change, recurrence, incomplete removal and infection.
Duration Of Freeze Thaw-Cycle (Seconds): 2
Render Note In Bullet Format When Appropriate: No
Post-Care Instructions: I reviewed with the patient in detail post-care instructions. Patient is to wear sunprotection, and avoid picking at any of the treated lesions. Pt may apply Vaseline to crusted or scabbing areas.
Detail Level: Simple
Medical Necessity Clause: This procedure was medically necessary because the lesions that were treated were: inflamed and itchy and very bothersome
Medical Necessity Information: It is in your best interest to select a reason for this procedure from the list below. All of these items fulfill various CMS LCD requirements except the new and changing color options.
Detail Level: Detailed

## 2021-07-17 RX ORDER — CELECOXIB 200 MG/1
CAPSULE ORAL
Qty: 180 CAPSULE | Refills: 1 | Status: SHIPPED | OUTPATIENT
Start: 2021-07-17 | End: 2022-05-02 | Stop reason: SDUPTHER

## 2021-08-04 ENCOUNTER — HOSPITAL ENCOUNTER (OUTPATIENT)
Dept: LAB | Facility: MEDICAL CENTER | Age: 61
End: 2021-08-04
Attending: INTERNAL MEDICINE
Payer: COMMERCIAL

## 2021-08-05 ENCOUNTER — HOSPITAL ENCOUNTER (OUTPATIENT)
Dept: LAB | Facility: MEDICAL CENTER | Age: 61
End: 2021-08-05
Attending: INTERNAL MEDICINE
Payer: COMMERCIAL

## 2021-08-05 LAB
G LAMBLIA+C PARVUM AG STL QL RAPID: NORMAL
SIGNIFICANT IND 70042: NORMAL
SITE SITE: NORMAL
SOURCE SOURCE: NORMAL

## 2021-08-05 PROCEDURE — 87328 CRYPTOSPORIDIUM AG IA: CPT

## 2021-08-05 PROCEDURE — 82542 COL CHROMOTOGRAPHY QUAL/QUAN: CPT

## 2021-08-05 PROCEDURE — 84302 ASSAY OF SWEAT SODIUM: CPT

## 2021-08-05 PROCEDURE — 82438 ASSAY OTHER FLUID CHLORIDES: CPT

## 2021-08-05 PROCEDURE — 87329 GIARDIA AG IA: CPT

## 2021-08-05 PROCEDURE — 36415 COLL VENOUS BLD VENIPUNCTURE: CPT

## 2021-08-05 PROCEDURE — 83520 IMMUNOASSAY QUANT NOS NONAB: CPT

## 2021-08-05 PROCEDURE — 84999 UNLISTED CHEMISTRY PROCEDURE: CPT

## 2021-08-06 ENCOUNTER — HOSPITAL ENCOUNTER (OUTPATIENT)
Facility: MEDICAL CENTER | Age: 61
End: 2021-08-06
Attending: INTERNAL MEDICINE
Payer: COMMERCIAL

## 2021-08-06 PROCEDURE — 83986 ASSAY PH BODY FLUID NOS: CPT

## 2021-08-06 PROCEDURE — 82705 FATS/LIPIDS FECES QUAL: CPT

## 2021-08-06 PROCEDURE — 83520 IMMUNOASSAY QUANT NOS NONAB: CPT

## 2021-08-06 PROCEDURE — 84999 UNLISTED CHEMISTRY PROCEDURE: CPT

## 2021-08-07 ENCOUNTER — PATIENT MESSAGE (OUTPATIENT)
Dept: MEDICAL GROUP | Facility: PHYSICIAN GROUP | Age: 61
End: 2021-08-07

## 2021-08-07 DIAGNOSIS — Z20.822 CLOSE EXPOSURE TO COVID-19 VIRUS: ICD-10-CM

## 2021-08-07 LAB
CHLORIDE STL-SCNC: 47 MMOL/L
POTASSIUM STL-SCNC: 51 MMOL/L
SODIUM STL-SCNC: <20 MMOL/L

## 2021-08-09 LAB
FAT STL QL: NORMAL
MISCELLANEOUS LAB RESULT MISCLAB: NORMAL
NEUTRAL FAT STL QL: NORMAL
OSMOLALITY STL: NORMAL MOSM/KG (ref 280–303)

## 2021-08-18 LAB — ELASTASE PANC STL-MCNT: 537 UG/G

## 2021-08-27 LAB — MISCELLANEOUS LAB RESULT MISCLAB: NORMAL

## 2021-08-30 ENCOUNTER — PATIENT MESSAGE (OUTPATIENT)
Dept: MEDICAL GROUP | Facility: PHYSICIAN GROUP | Age: 61
End: 2021-08-30

## 2021-08-31 ENCOUNTER — TELEPHONE (OUTPATIENT)
Dept: MEDICAL GROUP | Facility: PHYSICIAN GROUP | Age: 61
End: 2021-08-31

## 2021-09-09 ENCOUNTER — TELEPHONE (OUTPATIENT)
Dept: MEDICAL GROUP | Facility: PHYSICIAN GROUP | Age: 61
End: 2021-09-09

## 2021-09-09 NOTE — TELEPHONE ENCOUNTER
[facility] - [place] - [address]  [address]  [place] [address]  Phone: [phone] Fax: [phone]    Pharmacy sent in rx request for c-progesterone 60mg/gm  #90.

## 2021-09-10 ENCOUNTER — TELEPHONE (OUTPATIENT)
Dept: MEDICAL GROUP | Facility: PHYSICIAN GROUP | Age: 61
End: 2021-09-10

## 2021-09-10 NOTE — TELEPHONE ENCOUNTER
----- Message from Lizy Saleem sent at 9/9/2021 12:19 PM PDT -----  Regarding: Status of Effexor Prior Authorization   Hi,  I just spoke with my [facility] regarding status a prior authorization for the name brand Effexor 150mg XR. They said they’ve tried to contact you several times requesting the clinical notes showing I need this as the generic form does not work for me. I recently tried it again and the generic form does not work for me.  In the meantime, this week I am paying $20 a day for the Effexor 150 mg XR pill.  If you have already sent the clinical notes, I don’t know why they haven’t received them.  They suggested you lilliana “Urgent” or “Expedited” and resend the clinical notes to [facility].  At some point, please let me know what’s going on.    Thank you very much!!  Lizy Saleem  [phone]

## 2021-09-13 ENCOUNTER — TELEPHONE (OUTPATIENT)
Dept: MEDICAL GROUP | Facility: PHYSICIAN GROUP | Age: 61
End: 2021-09-13

## 2021-09-13 NOTE — TELEPHONE ENCOUNTER
----- Message from Yulisa Arcos P.A.-C. sent at 8/30/2021 12:40 PM PDT -----  Regarding: FW: Effexor Prior Authorization Request  Can we please start working on prior authorization for her?  I agree she needs brand-name only.    Thank you,    Yulisa Arcos PA-C    ----- Message -----  From: Lona Vogel, Med Ass't  Sent: 8/30/2021  10:41 AM PDT  To: Yulisa Arcos P.A.-C.  Subject: FW: Effexor Prior Authorization Request            ----- Message -----  From: Lizy Saleem  Sent: 8/30/2021  10:38 AM PDT  To: [facility]  Subject: Effexor Prior Authorization Request              Hi,  My [facility] RX program will not cover anything for the Effexor 150 mg XR.  It is not on their list of formulary drugs.  January to August, I’m able to use the X[employer]ex discount card ($30 monthly).  This only allows a yearly savings of $1800.  Now that I’ve reached this amount for  2021, the lowest price I can find is over $500 for a 30-day supply.  I recently tried the generic form Venlafaxine 150mg ER.  This does not help me.  I really need the name brand.  When I spoke with [facility] today, they said to contact you to start the prior authorization process with “Cover My Meds”.  I only have another week left of the Effexor 150mg XR.  Thanks so much for your assistance.  Lizy Saleem       
FINAL PRIOR AUTHORIZATION STATUS:    1.  Name of Medication & Dose: venlafaxine     2. Prior Auth Status: Approved through 09/13/2022     3. Action Taken: Pharmacy Notified: yes Patient Notified: yes  
Prior Auth started.  
CHEST DISCOMFORT/CHEST PAIN

## 2021-09-13 NOTE — TELEPHONE ENCOUNTER
[facility] - [place] - [address]  [address]  [place] [address]  Phone: [phone] Fax: [phone]    Pharmacy is asking for refill on c-progesterone 60mg/gm CR

## 2021-09-14 ENCOUNTER — TELEPHONE (OUTPATIENT)
Dept: MEDICAL GROUP | Facility: PHYSICIAN GROUP | Age: 61
End: 2021-09-14

## 2021-09-14 NOTE — TELEPHONE ENCOUNTER
Received rx request for c-progesterone 60mc/gram.  Called pharmacy to verify.  They received rx for C-E2/E3 which is wrong.

## 2021-09-22 ENCOUNTER — HOSPITAL ENCOUNTER (OUTPATIENT)
Dept: LAB | Facility: MEDICAL CENTER | Age: 61
End: 2021-09-22
Attending: PHYSICIAN ASSISTANT
Payer: COMMERCIAL

## 2021-09-22 ENCOUNTER — TELEPHONE (OUTPATIENT)
Dept: MEDICAL GROUP | Facility: PHYSICIAN GROUP | Age: 61
End: 2021-09-22

## 2021-09-22 DIAGNOSIS — Z13.0 SCREENING FOR DEFICIENCY ANEMIA: ICD-10-CM

## 2021-09-22 DIAGNOSIS — Z13.6 SCREENING FOR CARDIOVASCULAR CONDITION: ICD-10-CM

## 2021-09-22 DIAGNOSIS — D64.9 ANEMIA, UNSPECIFIED TYPE: ICD-10-CM

## 2021-09-22 DIAGNOSIS — E03.9 ACQUIRED HYPOTHYROIDISM: ICD-10-CM

## 2021-09-22 LAB
ALBUMIN SERPL BCP-MCNC: 4.1 G/DL (ref 3.2–4.9)
ALBUMIN/GLOB SERPL: 1.7 G/DL
ALP SERPL-CCNC: 57 U/L (ref 30–99)
ALT SERPL-CCNC: 27 U/L (ref 2–50)
ANION GAP SERPL CALC-SCNC: 9 MMOL/L (ref 7–16)
AST SERPL-CCNC: 22 U/L (ref 12–45)
BASOPHILS # BLD AUTO: 0.7 % (ref 0–1.8)
BASOPHILS # BLD: 0.04 K/UL (ref 0–0.12)
BILIRUB SERPL-MCNC: 0.3 MG/DL (ref 0.1–1.5)
BUN SERPL-MCNC: 18 MG/DL (ref 8–22)
CALCIUM SERPL-MCNC: 8.8 MG/DL (ref 8.5–10.5)
CHLORIDE SERPL-SCNC: 103 MMOL/L (ref 96–112)
CHOLEST SERPL-MCNC: 147 MG/DL (ref 100–199)
CO2 SERPL-SCNC: 27 MMOL/L (ref 20–33)
CREAT SERPL-MCNC: 0.81 MG/DL (ref 0.5–1.4)
EOSINOPHIL # BLD AUTO: 0.15 K/UL (ref 0–0.51)
EOSINOPHIL NFR BLD: 2.6 % (ref 0–6.9)
ERYTHROCYTE [DISTWIDTH] IN BLOOD BY AUTOMATED COUNT: 47.9 FL (ref 35.9–50)
FASTING STATUS PATIENT QL REPORTED: NORMAL
GLOBULIN SER CALC-MCNC: 2.4 G/DL (ref 1.9–3.5)
GLUCOSE SERPL-MCNC: 90 MG/DL (ref 65–99)
HCT VFR BLD AUTO: 39.8 % (ref 37–47)
HDLC SERPL-MCNC: 89 MG/DL
HGB BLD-MCNC: 13.2 G/DL (ref 12–16)
IMM GRANULOCYTES # BLD AUTO: 0.01 K/UL (ref 0–0.11)
IMM GRANULOCYTES NFR BLD AUTO: 0.2 % (ref 0–0.9)
LDLC SERPL CALC-MCNC: 52 MG/DL
LYMPHOCYTES # BLD AUTO: 1.44 K/UL (ref 1–4.8)
LYMPHOCYTES NFR BLD: 25.2 % (ref 22–41)
MCH RBC QN AUTO: 31.7 PG (ref 27–33)
MCHC RBC AUTO-ENTMCNC: 33.2 G/DL (ref 33.6–35)
MCV RBC AUTO: 95.4 FL (ref 81.4–97.8)
MONOCYTES # BLD AUTO: 0.42 K/UL (ref 0–0.85)
MONOCYTES NFR BLD AUTO: 7.4 % (ref 0–13.4)
NEUTROPHILS # BLD AUTO: 3.65 K/UL (ref 2–7.15)
NEUTROPHILS NFR BLD: 63.9 % (ref 44–72)
NRBC # BLD AUTO: 0 K/UL
NRBC BLD-RTO: 0 /100 WBC
PLATELET # BLD AUTO: 206 K/UL (ref 164–446)
PMV BLD AUTO: 12 FL (ref 9–12.9)
POTASSIUM SERPL-SCNC: 4.3 MMOL/L (ref 3.6–5.5)
PROT SERPL-MCNC: 6.5 G/DL (ref 6–8.2)
RBC # BLD AUTO: 4.17 M/UL (ref 4.2–5.4)
SODIUM SERPL-SCNC: 139 MMOL/L (ref 135–145)
T3FREE SERPL-MCNC: 2.48 PG/ML (ref 2–4.4)
T4 FREE SERPL-MCNC: 1.13 NG/DL (ref 0.93–1.7)
TRIGL SERPL-MCNC: 28 MG/DL (ref 0–149)
TSH SERPL DL<=0.005 MIU/L-ACNC: 1.81 UIU/ML (ref 0.38–5.33)
WBC # BLD AUTO: 5.7 K/UL (ref 4.8–10.8)

## 2021-09-22 PROCEDURE — 80061 LIPID PANEL: CPT

## 2021-09-22 PROCEDURE — 84439 ASSAY OF FREE THYROXINE: CPT

## 2021-09-22 PROCEDURE — 84481 FREE ASSAY (FT-3): CPT

## 2021-09-22 PROCEDURE — 80053 COMPREHEN METABOLIC PANEL: CPT

## 2021-09-22 PROCEDURE — 85025 COMPLETE CBC W/AUTO DIFF WBC: CPT

## 2021-09-22 PROCEDURE — 84443 ASSAY THYROID STIM HORMONE: CPT

## 2021-09-22 PROCEDURE — 36415 COLL VENOUS BLD VENIPUNCTURE: CPT

## 2021-09-22 NOTE — TELEPHONE ENCOUNTER
----- Message from Yulisa Arcos P.A.-C. sent at 9/22/2021  3:08 PM PDT -----  Please call patient about their results.     Results showed: Labs overall look good. Thyroid panel within normal limits. Cholesterol looks excellent. CBC indicates slight anemia. Would recommend checking your iron panel. I have wrote a lab order for this. Would like to get your earliest convenience. It is a nonfasting lab.    If you have any questions or concerns, do not hesitate to contact me or my Medical Assistant. Thank you for your time today.     Respectfully,     Yulisa Arcos PA-C

## 2021-10-25 ENCOUNTER — PATIENT MESSAGE (OUTPATIENT)
Dept: MEDICAL GROUP | Facility: PHYSICIAN GROUP | Age: 61
End: 2021-10-25

## 2021-11-16 ENCOUNTER — TELEPHONE (OUTPATIENT)
Dept: MEDICAL GROUP | Facility: PHYSICIAN GROUP | Age: 61
End: 2021-11-16

## 2021-11-16 NOTE — TELEPHONE ENCOUNTER
FINAL PRIOR AUTHORIZATION STATUS:    1.  Name of Medication & Dose: Effexor XR     2. Prior Auth Status: Approved through 11/16/2022     3. Action Taken: Pharmacy Notified: yes Patient Notified: yes

## 2021-12-28 ENCOUNTER — HOSPITAL ENCOUNTER (OUTPATIENT)
Dept: RADIOLOGY | Facility: MEDICAL CENTER | Age: 61
End: 2021-12-28
Attending: PHYSICIAN ASSISTANT
Payer: COMMERCIAL

## 2021-12-28 DIAGNOSIS — Z12.31 VISIT FOR SCREENING MAMMOGRAM: ICD-10-CM

## 2021-12-28 PROCEDURE — 77063 BREAST TOMOSYNTHESIS BI: CPT

## 2022-01-20 ENCOUNTER — TELEMEDICINE (OUTPATIENT)
Dept: MEDICAL GROUP | Facility: PHYSICIAN GROUP | Age: 62
End: 2022-01-20
Payer: COMMERCIAL

## 2022-01-20 VITALS — HEIGHT: 66 IN | WEIGHT: 160 LBS | RESPIRATION RATE: 16 BRPM | BODY MASS INDEX: 25.71 KG/M2

## 2022-01-20 DIAGNOSIS — Z79.890 HORMONE REPLACEMENT THERAPY (HRT): ICD-10-CM

## 2022-01-20 DIAGNOSIS — R92.2 DENSE BREASTS: ICD-10-CM

## 2022-01-20 DIAGNOSIS — Z13.1 ENCOUNTER FOR SCREENING FOR DIABETES MELLITUS: ICD-10-CM

## 2022-01-20 DIAGNOSIS — F41.9 ANXIETY: ICD-10-CM

## 2022-01-20 DIAGNOSIS — R92.30 DENSE BREASTS: ICD-10-CM

## 2022-01-20 DIAGNOSIS — Z13.0 SCREENING FOR DEFICIENCY ANEMIA: ICD-10-CM

## 2022-01-20 DIAGNOSIS — Z13.6 SCREENING FOR CARDIOVASCULAR CONDITION: ICD-10-CM

## 2022-01-20 DIAGNOSIS — E03.9 ACQUIRED HYPOTHYROIDISM: ICD-10-CM

## 2022-01-20 DIAGNOSIS — N95.1 MENOPAUSAL SYNDROME (HOT FLASHES): ICD-10-CM

## 2022-01-20 PROCEDURE — 99204 OFFICE O/P NEW MOD 45 MIN: CPT | Performed by: INTERNAL MEDICINE

## 2022-01-20 RX ORDER — NYSTATIN 100000 U/G
OINTMENT TOPICAL
COMMUNITY
Start: 2021-11-17 | End: 2022-02-01

## 2022-01-20 RX ORDER — RIFAXIMIN 550 MG/1
TABLET ORAL
COMMUNITY
Start: 2021-11-20 | End: 2022-01-20

## 2022-01-20 RX ORDER — ALBENDAZOLE 200 MG/1
TABLET, FILM COATED ORAL
COMMUNITY
Start: 2022-01-08 | End: 2022-02-01

## 2022-01-20 ASSESSMENT — FIBROSIS 4 INDEX: FIB4 SCORE: 1.23

## 2022-01-20 ASSESSMENT — PATIENT HEALTH QUESTIONNAIRE - PHQ9: CLINICAL INTERPRETATION OF PHQ2 SCORE: 0

## 2022-01-20 NOTE — PROGRESS NOTES
Virtual Visit: New Patient   This visit was conducted via Zoom using secure and encrypted videoconferencing technology.   The patient was in a private location in the state of [place].    The patient's identity was confirmed and verbal consent was obtained for this virtual visit.    Subjective:     CC: Establish care    Lizy Saleem is a 61 y.o. female presenting to establish care and to discuss the evaluation and management of:    The patient feels well.  No acute complaints.  The patient states she takes Effexor for chronic anxiety.  She states she requires the brand name medication, she has tried generic versions and they are not effective.  The patient wishes to obtain additional ultrasound imaging for her dense breasts given her significant family history for breast cancer as well as being on continuous hormone replacement therapy.  The patient wishes to taper off of her HRT, but has found it difficult so far.    ROS  See HPI    Current medicines (including changes today)  Current Outpatient Medications   Medication Sig Dispense Refill   • nystatin (MYCOSTATIN) 248763 UNIT/GM Ointment      • EFFEXOR  MG extended-release capsule Take 1 Capsule by mouth every day. (Patient taking differently: Take 150 mg by mouth every day. Patient NEEDS NAME BRAND, ......NO GENERIC) 30 Capsule 0   • NON SPECIFIED C-E2/E3 25 mg/mlTake 0.1ml SL daily 5 Each 1   • NON SPECIFIED C-Progesterone 60mc/gram. 4 clicks nightly 1 Each 2   • celecoxib (CELEBREX) 200 MG Cap TAKE 1 CAPSULE BY MOUTH TWICE A  capsule 1   • levothyroxine (SYNTHROID) 75 MCG Tab TAKE 1 TABLET BY MOUTH EVERY MORNING ON AN EMPTY STOMACH 90 tablet 3   • Misc Natural Products (PROGESTERONE EX) Apply  topically. c-progesteron 60 mc/gram, 4 clicks nightly     • valacyclovir (VALTREX) 1 GM Tab 1 tab PO once a day 90 Tab 3   • Non Formulary Request C-progesterone 60 mg/gm CR - apply 4 clicks topically daily 90 Each 3   • albendazole (ALBENZA) 200 MG Tab    "     No current facility-administered medications for this visit.       Patient Active Problem List    Diagnosis Date Noted   • Arthritis 01/12/2021   • Chronic diarrhea 11/18/2020   • Chronic midline low back pain without sciatica 11/18/2020   • Herpes labialis 02/08/2019   • Family history of abdominal aortic aneurysm 02/08/2019   • Evans's neuroma of left foot 11/08/2018   • Anxiety reaction 11/01/2018   • Acquired hypothyroidism 11/01/2018   • Menopausal symptom 11/01/2018   • Other sleep apnea 11/01/2018   • Restless legs 11/01/2018   • Family history of malignant neoplasm of breast 11/17/2017        Objective:   Resp 16   Ht 1.676 m (5' 6\")   Wt 72.6 kg (160 lb)   BMI 25.82 kg/m²     Physical Exam:  Constitutional: Alert, no distress, well-groomed.  Skin: No rashes in visible areas.  Eye: Round. Conjunctiva clear, lids normal. No icterus.   ENMT: Lips pink without lesions, good dentition, moist mucous membranes. Phonation normal.  Neck: No masses, no thyromegaly. Moves freely without pain.  Respiratory: Unlabored respiratory effort, no cough or audible wheeze  Psych: Alert and oriented x3, normal affect and mood.     Assessment and Plan:   The following treatment plan was discussed:     Acquired hypothyroidism  This is a chronic medical condition.  Ongoing and well controlled.  Labs from 9/22/2021 showed a normal TSH of 1.1, normal free T4 1.13.  The patient is clinically euthyroid.  -Continue levothyroxine 75 mcg daily  - TSH; Future  - FREE THYROXINE; Future    Anxiety   This is a chronic medical condition.  Ongoing and well controlled.  The patient requires Effexor (brand name) 150 mg ER daily to effectively control her symptoms.  -Continue Effexor (brand name) 150 mg ER daily    Menopausal syndrome (hot flashes)  Hormone replacement therapy (HRT)  This is a chronic medical condition.  Stable.  The patient is on progesterone and Bi-Est, obtain through [facility].  The patient is aware " of the risks of continuous HRT, including increased risk of cardiovascular disease, stroke, breast and other cancers.  She wishes to taper off the medication, but is having difficulty thus far.    Dense breasts  This is a chronic medical condition.  Ongoing.  The patient had a screening mammogram on 12/28/2021 which was notable for heterogeneously dense breast, but no evidence of malignancy or interval change.  The patient had 3D breast ultrasound imaging on 1/20/2021 which showed no sonographic abnormality bilaterally.  - US-SCREENING WHOLE BREAST (3D SCREENING); Future    Screening for deficiency anemia  - CBC WITH DIFFERENTIAL; Future    Encounter for screening for diabetes mellitus  - Comp Metabolic Panel; Future    Screening for cardiovascular condition  - Lipid Profile; Future      Follow-up: Return in about 6 months (around 7/20/2022) for f/u labs.     Please note that this dictation was created using voice recognition software. I have made every reasonable attempt to correct obvious errors, but I expect that there are errors of grammar and possibly content that I did not discover before finalizing the note.

## 2022-01-21 PROBLEM — Z79.890 HORMONE REPLACEMENT THERAPY (HRT): Status: ACTIVE | Noted: 2022-01-21

## 2022-01-21 PROBLEM — R92.2 DENSE BREASTS: Status: ACTIVE | Noted: 2022-01-21

## 2022-01-21 PROBLEM — R92.30 DENSE BREASTS: Status: ACTIVE | Noted: 2022-01-21

## 2022-01-25 ENCOUNTER — HOSPITAL ENCOUNTER (OUTPATIENT)
Dept: RADIOLOGY | Facility: MEDICAL CENTER | Age: 62
End: 2022-01-25
Attending: INTERNAL MEDICINE
Payer: COMMERCIAL

## 2022-01-25 DIAGNOSIS — R92.2 DENSE BREASTS: ICD-10-CM

## 2022-01-25 DIAGNOSIS — R92.30 DENSE BREASTS: ICD-10-CM

## 2022-01-25 PROCEDURE — 76641 ULTRASOUND BREAST COMPLETE: CPT

## 2022-01-31 NOTE — PROGRESS NOTES
Subjective:     CC:   Chief Complaint   Patient presents with   • Hand Pain     left hand and thumb         HPI:   Lizy presents today to discuss the following issues:    The patient reports left thumb pain x1 year.  The pain is described as a dull ache.  It is in her left hand and she is right-hand dominant.  She states that when she gets massages she has tenderness over the area.  She does not recall any repetitive activities requiring the use of that hand.    The patient is very concerned about the possibility of breast cancer given that she is on HRT.  She reports an aunt and cousin both with postmenopausal breast cancer.  Screening mammogram on 12/28/2021 was benign.  3D whole breast ultrasound screening on 1/25/2022 was unremarkable.  The patient wonders if she would get more useful information from a breast MRI.      Past Medical History:   Diagnosis Date   • Acquired hypothyroidism 11/1/2018   • Anesthesia     PONV   • Anxiety reaction 11/1/2018   • Chronic diarrhea 11/18/2020   • Chronic midline low back pain without sciatica 11/18/2020   • Family history of abdominal aortic aneurysm 2/8/2019   • Herpes labialis 2/8/2019   • Evans's neuroma of left foot 11/8/2018       Social History     Tobacco Use   • Smoking status: Never Smoker   • Smokeless tobacco: Never Used   Vaping Use   • Vaping Use: Never used   Substance Use Topics   • Alcohol use: Yes     Comment: 1xmonth   • Drug use: No       Current Outpatient Medications Ordered in Epic   Medication Sig Dispense Refill   • EFFEXOR  MG extended-release capsule Take 1 Capsule by mouth every day. (Patient taking differently: Take 150 mg by mouth every day. Patient NEEDS NAME BRAND, ......NO GENERIC) 30 Capsule 0   • NON SPECIFIED C-E2/E3 25 mg/mlTake 0.1ml SL daily 5 Each 1   • celecoxib (CELEBREX) 200 MG Cap TAKE 1 CAPSULE BY MOUTH TWICE A  capsule 1   • levothyroxine (SYNTHROID) 75 MCG Tab TAKE 1 TABLET BY MOUTH EVERY MORNING ON AN EMPTY  "STOMACH 90 tablet 3   • valacyclovir (VALTREX) 1 GM Tab 1 tab PO once a day 90 Tab 3   • Non Formulary Request C-progesterone 60 mg/gm CR - apply 4 clicks topically daily 90 Each 3     No current Epic-ordered facility-administered medications on file.       Allergies:  Patient has no active allergies.    Health Maintenance: Completed      Objective:       Exam:  /74 (BP Location: Left arm, Patient Position: Sitting, BP Cuff Size: Adult)   Pulse 70   Temp 37.2 °C (99 °F) (Temporal)   Resp 16   Ht 1.676 m (5' 6\")   Wt 80.5 kg (177 lb 6.4 oz)   SpO2 97%   BMI 28.63 kg/m²  Body mass index is 28.63 kg/m².    Gen: Alert and oriented, No apparent distress.  Left Hand: Tenderness over base of thumb, Finkelstein test negative        Assessment & Plan:     61 y.o. female with the following -     Pain of left hand  This is a chronic condition.  Ongoing.  The patient reports pain at the base of her left thumb over the last year.  No antecedent trauma or repetitive activities noted.  She is right-hand dominant.  Differential includes osteoarthritis versus de Quervain's tenosynovitis.  The patient was given an informational handout on de Quervain's tenosynovitis.  - DX-HAND 2- LEFT; Future  - DX-WRIST-COMPLETE 3+ LEFT; Future    Encounter for screening for malignant neoplasm of breast, unspecified screening modality  Family history of malignant neoplasm of breast  This is a chronic medical condition.  Ongoing.  The patient is very concerned about the possibility of breast cancer given that she is on HRT.  She reports an aunt and cousin both with postmenopausal breast cancer.  Screening mammogram on 12/28/2021 was benign.  3D whole breast ultrasound screening on 1/25/2022 was unremarkable.  The patient wonders if she would get more useful information from a breast MRI.  - MR-BREAST-BILATERAL-W/O; Future    Return if symptoms worsen or fail to improve.    Please note that this dictation was created using voice " recognition software. I have made every reasonable attempt to correct obvious errors, but I expect that there are errors of grammar and possibly content that I did not discover before finalizing the note.

## 2022-02-01 ENCOUNTER — OFFICE VISIT (OUTPATIENT)
Dept: MEDICAL GROUP | Facility: PHYSICIAN GROUP | Age: 62
End: 2022-02-01
Payer: COMMERCIAL

## 2022-02-01 VITALS
HEIGHT: 66 IN | DIASTOLIC BLOOD PRESSURE: 74 MMHG | WEIGHT: 177.4 LBS | TEMPERATURE: 99 F | RESPIRATION RATE: 16 BRPM | OXYGEN SATURATION: 97 % | BODY MASS INDEX: 28.51 KG/M2 | SYSTOLIC BLOOD PRESSURE: 112 MMHG | HEART RATE: 70 BPM

## 2022-02-01 DIAGNOSIS — Z80.3 FAMILY HISTORY OF MALIGNANT NEOPLASM OF BREAST: ICD-10-CM

## 2022-02-01 DIAGNOSIS — M79.642 PAIN OF LEFT HAND: ICD-10-CM

## 2022-02-01 DIAGNOSIS — Z12.39 ENCOUNTER FOR SCREENING FOR MALIGNANT NEOPLASM OF BREAST, UNSPECIFIED SCREENING MODALITY: ICD-10-CM

## 2022-02-01 PROCEDURE — 99213 OFFICE O/P EST LOW 20 MIN: CPT | Performed by: INTERNAL MEDICINE

## 2022-02-01 ASSESSMENT — FIBROSIS 4 INDEX: FIB4 SCORE: 1.23

## 2022-02-04 ENCOUNTER — APPOINTMENT (OUTPATIENT)
Dept: RADIOLOGY | Facility: MEDICAL CENTER | Age: 62
End: 2022-02-04
Attending: INTERNAL MEDICINE
Payer: COMMERCIAL

## 2022-02-09 ENCOUNTER — HOSPITAL ENCOUNTER (OUTPATIENT)
Dept: RADIOLOGY | Facility: MEDICAL CENTER | Age: 62
End: 2022-02-09
Attending: INTERNAL MEDICINE
Payer: COMMERCIAL

## 2022-02-09 DIAGNOSIS — M79.642 PAIN OF LEFT HAND: ICD-10-CM

## 2022-02-09 PROCEDURE — 73110 X-RAY EXAM OF WRIST: CPT | Mod: LT

## 2022-02-09 PROCEDURE — 73120 X-RAY EXAM OF HAND: CPT | Mod: LT

## 2022-02-18 RX ORDER — VALACYCLOVIR HYDROCHLORIDE 1 G/1
TABLET, FILM COATED ORAL
Qty: 90 TABLET | Refills: 3 | Status: SHIPPED | OUTPATIENT
Start: 2022-02-18 | End: 2022-07-11 | Stop reason: SDUPTHER

## 2022-04-06 ENCOUNTER — HOSPITAL ENCOUNTER (OUTPATIENT)
Dept: LAB | Facility: MEDICAL CENTER | Age: 62
End: 2022-04-06
Attending: INTERNAL MEDICINE
Payer: COMMERCIAL

## 2022-04-06 DIAGNOSIS — E03.9 ACQUIRED HYPOTHYROIDISM: ICD-10-CM

## 2022-04-06 LAB
T4 FREE SERPL-MCNC: 1.07 NG/DL (ref 0.93–1.7)
TSH SERPL DL<=0.005 MIU/L-ACNC: 1.88 UIU/ML (ref 0.38–5.33)

## 2022-04-06 PROCEDURE — 84439 ASSAY OF FREE THYROXINE: CPT

## 2022-04-06 PROCEDURE — 84443 ASSAY THYROID STIM HORMONE: CPT

## 2022-04-06 PROCEDURE — 36415 COLL VENOUS BLD VENIPUNCTURE: CPT

## 2022-05-03 RX ORDER — CELECOXIB 200 MG/1
200 CAPSULE ORAL 2 TIMES DAILY
Qty: 180 CAPSULE | Refills: 1 | Status: SHIPPED | OUTPATIENT
Start: 2022-05-03 | End: 2022-07-22 | Stop reason: SDUPTHER

## 2022-05-03 RX ORDER — LEVOTHYROXINE SODIUM 0.07 MG/1
75 TABLET ORAL
Qty: 90 TABLET | Refills: 3 | Status: SHIPPED | OUTPATIENT
Start: 2022-05-03 | End: 2022-10-31 | Stop reason: SDUPTHER

## 2022-06-14 NOTE — PROGRESS NOTES
Subjective:     CC:   Chief Complaint   Patient presents with   • Pain     Right knee         HPI:   Lizy Saleem is a 61-year-old female who presents to discuss the following issues:    Chronic pain of both knees  The patient reports bilateral knee pain, right greater than left.  Her right knee has been particularly bothersome over the last month.  No associated swelling or redness.  No antecedent trauma.  She takes Celebrex 200 mg twice daily.     Overweight with body mass index (BMI) 25.0-29.9  The patient reports difficulty losing weight despite dietary modification with an intermittent fasting diet wonders if there are potential interventions available to assist her with weight loss.    Recurrent HSV (herpes simplex virus)  The patient is on valacyclovir 1 g daily for prophylaxis, and increases the dose to 2 or 3 times daily for acute flares.  She does report recently having 2 episodes of cold sores, likely related to the stress of her mother's recent lung cancer diagnosis.      Past Medical History:   Diagnosis Date   • Acquired hypothyroidism 11/1/2018   • Anesthesia     PONV   • Anxiety reaction 11/1/2018   • Chronic diarrhea 11/18/2020   • Chronic midline low back pain without sciatica 11/18/2020   • Family history of abdominal aortic aneurysm 2/8/2019   • Herpes labialis 2/8/2019   • Evans's neuroma of left foot 11/8/2018       Social History     Tobacco Use   • Smoking status: Never Smoker   • Smokeless tobacco: Never Used   Vaping Use   • Vaping Use: Never used   Substance Use Topics   • Alcohol use: Yes     Comment: 1xmonth   • Drug use: No       Current Outpatient Medications Ordered in Epic   Medication Sig Dispense Refill   • nystatin (MYCOSTATIN) 576569 UNIT/GM Ointment APPLY TO AFFECTED AREA 2 TO 4 TIMES A DAY     • levothyroxine (SYNTHROID) 75 MCG Tab Take 1 Tablet by mouth every morning on an empty stomach. 90 Tablet 3   • celecoxib (CELEBREX) 200 MG Cap Take 1 Capsule by mouth 2 times a day.  "180 Capsule 1   • valacyclovir (VALTREX) 1 GM Tab TAKE 1 TABLET BY MOUTH EVERY DAY 90 Tablet 3   • EFFEXOR  MG extended-release capsule Take 1 Capsule by mouth every day. BRAND NAME 90 Capsule 3   • NON SPECIFIED C-E2/E3 25 mg/mlTake 0.1ml SL daily 5 Each 1   • Non Formulary Request C-progesterone 60 mg/gm CR - apply 4 clicks topically daily 90 Each 3     No current Epic-ordered facility-administered medications on file.       Allergies:  Patient has no known allergies.    Health Maintenance: Completed    ROS:   Denies any recent fevers or chills. No nausea or vomiting. No chest pains or shortness of breath.      Objective:       Exam:  Resp 18 Comment: per pt  Ht 1.676 m (5' 6\") Comment: per pt  Wt 81.6 kg (180 lb) Comment: per pt  BMI 29.05 kg/m²  Body mass index is 29.05 kg/m².    Gen: Alert and oriented, No apparent distress.  Lungs: Normal effort, CTA bilaterally, no wheezes, rhonchi, or rales  CV: Regular rate and rhythm. No murmurs, rubs, or gallops.  Ext: No clubbing, cyanosis, edema.        Assessment & Plan:     61 y.o. female with the following -     Chronic pain of both knees  Chronic medical condition.  Progressive.  The patient reports bilateral knee pain, right greater than left.  Her right knee has been particularly bothersome over the last month.  No associated swelling or redness.  No antecedent trauma.  She takes Celebrex 200 mg twice daily.  I am unable to examine the knees in person as this is a virtual visit.  - DX-KNEE 3 VIEWS LEFT; Future  - DX-KNEE 3 VIEWS RIGHT; Future    Overweight with body mass index (BMI) 25.0-29.9  Chronic medical condition.  Progressive.  The patient reports difficulty losing weight despite dietary modification with an intermittent fasting diet.  We discussed potential possibilities for interventions including referral to the [facility] weight loss program as well as medication assisted weight loss.  He    Acquired hypothyroidism  Chronic medical condition.  " Well-controlled.  The patient is on levothyroxine 75 mcg daily.  Labs from 4/6/2022 showed a normal TSH of 1.88 and a normal free T4 1.07.  The patient is clinically euthyroid.  -Continue levothyroxine 75 mcg daily    Recurrent HSV (herpes simplex virus)  Chronic medical condition.  The patient is on valacyclovir 1 g daily for prophylaxis, and increases the dose to 2 or 3 times daily for acute flares.  She does report recently having 2 episodes of cold sores, likely related to the stress of her mother's recent lung cancer diagnosis.  -Continue Valtrex 1 g daily for HSV prophylaxis, increasing to 2 or 3 times daily for acute flares      Return if symptoms worsen or fail to improve.    Please note that this dictation was created using voice recognition software. I have made every reasonable attempt to correct obvious errors, but I expect that there are errors of grammar and possibly content that I did not discover before finalizing the note.

## 2022-06-15 ENCOUNTER — TELEMEDICINE (OUTPATIENT)
Dept: MEDICAL GROUP | Facility: PHYSICIAN GROUP | Age: 62
End: 2022-06-15
Payer: COMMERCIAL

## 2022-06-15 VITALS — WEIGHT: 180 LBS | HEIGHT: 66 IN | RESPIRATION RATE: 18 BRPM | BODY MASS INDEX: 28.93 KG/M2

## 2022-06-15 DIAGNOSIS — M25.561 CHRONIC PAIN OF BOTH KNEES: ICD-10-CM

## 2022-06-15 DIAGNOSIS — M25.562 CHRONIC PAIN OF BOTH KNEES: ICD-10-CM

## 2022-06-15 DIAGNOSIS — E03.9 ACQUIRED HYPOTHYROIDISM: ICD-10-CM

## 2022-06-15 DIAGNOSIS — E66.3 OVERWEIGHT WITH BODY MASS INDEX (BMI) 25.0-29.9: ICD-10-CM

## 2022-06-15 DIAGNOSIS — G89.29 CHRONIC PAIN OF BOTH KNEES: ICD-10-CM

## 2022-06-15 DIAGNOSIS — B00.9 RECURRENT HSV (HERPES SIMPLEX VIRUS): ICD-10-CM

## 2022-06-15 PROCEDURE — 99214 OFFICE O/P EST MOD 30 MIN: CPT | Mod: 95 | Performed by: INTERNAL MEDICINE

## 2022-06-15 RX ORDER — NYSTATIN 100000 U/G
OINTMENT TOPICAL
COMMUNITY
Start: 2022-06-01 | End: 2023-02-17

## 2022-06-15 ASSESSMENT — FIBROSIS 4 INDEX: FIB4 SCORE: 1.23

## 2022-06-19 PROBLEM — M25.561 CHRONIC PAIN OF BOTH KNEES: Status: ACTIVE | Noted: 2022-06-19

## 2022-06-19 PROBLEM — E66.3 OVERWEIGHT WITH BODY MASS INDEX (BMI) 25.0-29.9: Status: ACTIVE | Noted: 2022-06-19

## 2022-06-19 PROBLEM — M25.562 CHRONIC PAIN OF BOTH KNEES: Status: ACTIVE | Noted: 2022-06-19

## 2022-06-19 PROBLEM — G89.29 CHRONIC PAIN OF BOTH KNEES: Status: ACTIVE | Noted: 2022-06-19

## 2022-06-19 PROBLEM — B00.9 RECURRENT HSV (HERPES SIMPLEX VIRUS): Status: ACTIVE | Noted: 2022-06-19

## 2022-06-24 NOTE — PROGRESS NOTES
Virtual Visit: Established Patient   This visit was conducted via Zoom using secure and encrypted videoconferencing technology. The patient was in a private location in the state of [place].    The patient's identity was confirmed and verbal consent was obtained for this virtual visit.    Subjective:     Chief Complaint   Patient presents with   • Pain     Right knee       Lizy Saleem is a 61 y.o. female presenting for evaluation and management of:    Chronic pain of both knees  The patient reports bilateral knee pain, right greater than left.  Her right knee has been particularly bothersome over the last month.  No associated swelling or redness.  No antecedent trauma.  She takes Celebrex 200 mg twice daily.      Overweight with body mass index (BMI) 25.0-29.9  The patient reports difficulty losing weight despite dietary modification with an intermittent fasting diet wonders if there are potential interventions available to assist her with weight loss.     Recurrent HSV (herpes simplex virus)  The patient is on valacyclovir 1 g daily for prophylaxis, and increases the dose to 2 or 3 times daily for acute flares.  She does report recently having 2 episodes of cold sores, likely related to the stress of her mother's recent lung cancer diagnosis.    ROS   Denies any recent fevers or chills. No nausea or vomiting. No chest pains or shortness of breath.     No Known Allergies    Current medicines (including changes today)  Current Outpatient Medications   Medication Sig Dispense Refill   • nystatin (MYCOSTATIN) [id number] UNIT/GM Ointment APPLY TO AFFECTED AREA 2 TO 4 TIMES A DAY     • levothyroxine (SYNTHROID) 75 MCG Tab Take 1 Tablet by mouth every morning on an empty stomach. 90 Tablet 3   • celecoxib (CELEBREX) 200 MG Cap Take 1 Capsule by mouth 2 times a day. 180 Capsule 1   • valacyclovir (VALTREX) 1 GM Tab TAKE 1 TABLET BY MOUTH EVERY DAY 90 Tablet 3   • EFFEXOR  MG extended-release capsule Take 1 Capsule  by mouth every day. BRAND NAME 90 Capsule 3   • NON SPECIFIED C-E2/E3 25 mg/mlTake 0.1ml SL daily 5 Each 1   • Non Formulary Request C-progesterone 60 mg/gm CR - apply 4 clicks topically daily 90 Each 3     No current facility-administered medications for this visit.       Patient Active Problem List    Diagnosis Date Noted   • Overweight with body mass index (BMI) 25.0-29.9 06/19/2022   • Chronic pain of both knees 06/19/2022   • Recurrent HSV (herpes simplex virus) 06/19/2022   • Dense breasts 01/21/2022   • Hormone replacement therapy (HRT) 01/21/2022   • Arthritis 01/12/2021   • Chronic diarrhea 11/18/2020   • Chronic midline low back pain without sciatica 11/18/2020   • Herpes labialis 02/08/2019   • Family history of abdominal aortic aneurysm 02/08/2019   • Evans's neuroma of left foot 11/08/2018   • Anxiety reaction 11/01/2018   • Acquired hypothyroidism 11/01/2018   • Menopausal syndrome (hot flashes) 11/01/2018   • Other sleep apnea 11/01/2018   • Restless legs 11/01/2018   • Family history of malignant neoplasm of breast 11/17/2017       Family History   Problem Relation Age of Onset   • Breast Cancer Paternal Aunt    • Cancer Father    • Hyperlipidemia Father    • Hypertension Father    • Other Father         multiple aneurisms   • Thyroid Mother    • Cancer Mother         melanoma skin and eye   • Lung Disease Mother    • Cancer Cousin        She  has a past medical history of Acquired hypothyroidism (11/1/2018), Anesthesia, Anxiety reaction (11/1/2018), Chronic diarrhea (11/18/2020), Chronic midline low back pain without sciatica (11/18/2020), Family history of abdominal aortic aneurysm (2/8/2019), Herpes labialis (2/8/2019), and Evans's neuroma of left foot (11/8/2018).  She  has a past surgical history that includes bunionectomy (Left, 2000); bunionectomy (Right, 2003); arthroscopy, knee (Left, 1992); hysteroscopy with video diagnostic (10/23/2015); and dilation and curettage (10/23/2015).        "Objective:   Resp 18 Comment: per pt  Ht 1.676 m (5' 6\") Comment: per pt  Wt 81.6 kg (180 lb) Comment: per pt  BMI 29.05 kg/m²     Physical Exam:  Constitutional: Alert, no distress, well-groomed.  Skin: No rashes in visible areas.  Eye: Round. Conjunctiva clear, lids normal. No icterus.   ENMT: Lips pink without lesions, good dentition, moist mucous membranes. Phonation normal.  Neck: No masses, no thyromegaly. Moves freely without pain.  Respiratory: Unlabored respiratory effort, no cough or audible wheeze  Psych: Alert and oriented x3, normal affect and mood.       Assessment and Plan:   The following treatment plan was discussed:     Chronic pain of both knees  Chronic medical condition.  Progressive.  The patient reports bilateral knee pain, right greater than left.  Her right knee has been particularly bothersome over the last month.  No associated swelling or redness.  No antecedent trauma.  She takes Celebrex 200 mg twice daily.  I am unable to examine the knees in person as this is a virtual visit.  - DX-KNEE 3 VIEWS LEFT; Future  - DX-KNEE 3 VIEWS RIGHT; Future     Overweight with body mass index (BMI) 25.0-29.9  Chronic medical condition.  Progressive.  The patient reports difficulty losing weight despite dietary modification with an intermittent fasting diet.  We discussed potential possibilities for interventions including referral to the [facility] weight loss program as well as medication assisted weight loss.  He     Acquired hypothyroidism  Chronic medical condition.  Well-controlled.  The patient is on levothyroxine 75 mcg daily.  Labs from 4/6/2022 showed a normal TSH of 1.88 and a normal free T4 1.07.  The patient is clinically euthyroid.  -Continue levothyroxine 75 mcg daily     Recurrent HSV (herpes simplex virus)  Chronic medical condition.  The patient is on valacyclovir 1 g daily for prophylaxis, and increases the dose to 2 or 3 times daily for acute flares.  She does report recently " having 2 episodes of cold sores, likely related to the stress of her mother's recent lung cancer diagnosis.  -Continue Valtrex 1 g daily for HSV prophylaxis, increasing to 2 or 3 times daily for acute flares    Other orders  - nystatin (MYCOSTATIN) 118216 UNIT/GM Ointment; APPLY TO AFFECTED AREA 2 TO 4 TIMES A DAY    Follow-up: Return if symptoms worsen or fail to improve.     Please note that this dictation was created using voice recognition software. I have made every reasonable attempt to correct obvious errors, but I expect that there are errors of grammar and possibly content that I did not discover before finalizing the note.

## 2022-07-11 ENCOUNTER — PATIENT MESSAGE (OUTPATIENT)
Dept: MEDICAL GROUP | Facility: PHYSICIAN GROUP | Age: 62
End: 2022-07-11
Payer: COMMERCIAL

## 2022-07-11 RX ORDER — VALACYCLOVIR HYDROCHLORIDE 1 G/1
TABLET, FILM COATED ORAL
Qty: 90 TABLET | Refills: 3 | Status: SHIPPED
Start: 2022-07-11 | End: 2023-04-07

## 2022-07-22 RX ORDER — CELECOXIB 200 MG/1
200 CAPSULE ORAL 2 TIMES DAILY
Qty: 60 CAPSULE | Refills: 0 | Status: SHIPPED | OUTPATIENT
Start: 2022-07-22 | End: 2022-10-30

## 2022-09-29 ENCOUNTER — HOSPITAL ENCOUNTER (OUTPATIENT)
Dept: LAB | Facility: MEDICAL CENTER | Age: 62
End: 2022-09-29
Attending: INTERNAL MEDICINE
Payer: COMMERCIAL

## 2022-09-29 DIAGNOSIS — Z13.1 ENCOUNTER FOR SCREENING FOR DIABETES MELLITUS: ICD-10-CM

## 2022-09-29 DIAGNOSIS — Z13.0 SCREENING FOR DEFICIENCY ANEMIA: ICD-10-CM

## 2022-09-29 DIAGNOSIS — Z13.6 SCREENING FOR CARDIOVASCULAR CONDITION: ICD-10-CM

## 2022-09-29 LAB
ALBUMIN SERPL BCP-MCNC: 4 G/DL (ref 3.2–4.9)
ALBUMIN/GLOB SERPL: 1.7 G/DL
ALP SERPL-CCNC: 48 U/L (ref 30–99)
ALT SERPL-CCNC: 23 U/L (ref 2–50)
ANION GAP SERPL CALC-SCNC: 9 MMOL/L (ref 7–16)
AST SERPL-CCNC: 22 U/L (ref 12–45)
BASOPHILS # BLD AUTO: 0.7 % (ref 0–1.8)
BASOPHILS # BLD: 0.04 K/UL (ref 0–0.12)
BILIRUB SERPL-MCNC: 0.2 MG/DL (ref 0.1–1.5)
BUN SERPL-MCNC: 30 MG/DL (ref 8–22)
CALCIUM SERPL-MCNC: 8.8 MG/DL (ref 8.5–10.5)
CHLORIDE SERPL-SCNC: 103 MMOL/L (ref 96–112)
CHOLEST SERPL-MCNC: 156 MG/DL (ref 100–199)
CO2 SERPL-SCNC: 27 MMOL/L (ref 20–33)
CREAT SERPL-MCNC: 0.78 MG/DL (ref 0.5–1.4)
EOSINOPHIL # BLD AUTO: 0.14 K/UL (ref 0–0.51)
EOSINOPHIL NFR BLD: 2.4 % (ref 0–6.9)
ERYTHROCYTE [DISTWIDTH] IN BLOOD BY AUTOMATED COUNT: 49 FL (ref 35.9–50)
FASTING STATUS PATIENT QL REPORTED: NORMAL
GFR SERPLBLD CREATININE-BSD FMLA CKD-EPI: 86 ML/MIN/1.73 M 2
GLOBULIN SER CALC-MCNC: 2.3 G/DL (ref 1.9–3.5)
GLUCOSE SERPL-MCNC: 88 MG/DL (ref 65–99)
HCT VFR BLD AUTO: 41.8 % (ref 37–47)
HDLC SERPL-MCNC: 81 MG/DL
HGB BLD-MCNC: 14 G/DL (ref 12–16)
IMM GRANULOCYTES # BLD AUTO: 0.03 K/UL (ref 0–0.11)
IMM GRANULOCYTES NFR BLD AUTO: 0.5 % (ref 0–0.9)
LDLC SERPL CALC-MCNC: 68 MG/DL
LYMPHOCYTES # BLD AUTO: 1.49 K/UL (ref 1–4.8)
LYMPHOCYTES NFR BLD: 25.2 % (ref 22–41)
MCH RBC QN AUTO: 31.7 PG (ref 27–33)
MCHC RBC AUTO-ENTMCNC: 33.5 G/DL (ref 33.6–35)
MCV RBC AUTO: 94.8 FL (ref 81.4–97.8)
MONOCYTES # BLD AUTO: 0.35 K/UL (ref 0–0.85)
MONOCYTES NFR BLD AUTO: 5.9 % (ref 0–13.4)
NEUTROPHILS # BLD AUTO: 3.86 K/UL (ref 2–7.15)
NEUTROPHILS NFR BLD: 65.3 % (ref 44–72)
NRBC # BLD AUTO: 0 K/UL
NRBC BLD-RTO: 0 /100 WBC
PLATELET # BLD AUTO: 225 K/UL (ref 164–446)
PMV BLD AUTO: 12.6 FL (ref 9–12.9)
POTASSIUM SERPL-SCNC: 4.3 MMOL/L (ref 3.6–5.5)
PROT SERPL-MCNC: 6.3 G/DL (ref 6–8.2)
RBC # BLD AUTO: 4.41 M/UL (ref 4.2–5.4)
SODIUM SERPL-SCNC: 139 MMOL/L (ref 135–145)
TRIGL SERPL-MCNC: 37 MG/DL (ref 0–149)
WBC # BLD AUTO: 5.9 K/UL (ref 4.8–10.8)

## 2022-09-29 PROCEDURE — 80053 COMPREHEN METABOLIC PANEL: CPT

## 2022-09-29 PROCEDURE — 85025 COMPLETE CBC W/AUTO DIFF WBC: CPT

## 2022-09-29 PROCEDURE — 80061 LIPID PANEL: CPT

## 2022-09-29 PROCEDURE — 36415 COLL VENOUS BLD VENIPUNCTURE: CPT

## 2022-09-30 DIAGNOSIS — E03.9 ACQUIRED HYPOTHYROIDISM: ICD-10-CM

## 2022-10-18 RX ORDER — PRAMOXINE HYDROCHLORIDE 10 MG/G
AEROSOL, FOAM TOPICAL
Qty: 15 G | Refills: 2 | Status: SHIPPED | OUTPATIENT
Start: 2022-10-18 | End: 2023-02-17

## 2022-10-30 RX ORDER — CELECOXIB 200 MG/1
CAPSULE ORAL
Qty: 180 CAPSULE | Refills: 1 | Status: SHIPPED | OUTPATIENT
Start: 2022-10-30 | End: 2023-04-17

## 2022-11-01 RX ORDER — LEVOTHYROXINE SODIUM 0.07 MG/1
75 TABLET ORAL
Qty: 90 TABLET | Refills: 3 | Status: SHIPPED | OUTPATIENT
Start: 2022-11-01 | End: 2023-12-18

## 2022-11-02 ENCOUNTER — APPOINTMENT (RX ONLY)
Dept: URBAN - METROPOLITAN AREA CLINIC 6 | Facility: CLINIC | Age: 62
Setting detail: DERMATOLOGY
End: 2022-11-02

## 2022-11-02 DIAGNOSIS — L82.1 OTHER SEBORRHEIC KERATOSIS: ICD-10-CM

## 2022-11-02 DIAGNOSIS — D22 MELANOCYTIC NEVI: ICD-10-CM

## 2022-11-02 DIAGNOSIS — L24 IRRITANT CONTACT DERMATITIS: ICD-10-CM

## 2022-11-02 DIAGNOSIS — L57.0 ACTINIC KERATOSIS: ICD-10-CM

## 2022-11-02 DIAGNOSIS — Z71.89 OTHER SPECIFIED COUNSELING: ICD-10-CM

## 2022-11-02 DIAGNOSIS — L81.4 OTHER MELANIN HYPERPIGMENTATION: ICD-10-CM

## 2022-11-02 DIAGNOSIS — D18.0 HEMANGIOMA: ICD-10-CM

## 2022-11-02 PROBLEM — D22.5 MELANOCYTIC NEVI OF TRUNK: Status: ACTIVE | Noted: 2022-11-02

## 2022-11-02 PROBLEM — L24.9 IRRITANT CONTACT DERMATITIS, UNSPECIFIED CAUSE: Status: ACTIVE | Noted: 2022-11-02

## 2022-11-02 PROBLEM — D18.01 HEMANGIOMA OF SKIN AND SUBCUTANEOUS TISSUE: Status: ACTIVE | Noted: 2022-11-02

## 2022-11-02 PROCEDURE — ? LIQUID NITROGEN

## 2022-11-02 PROCEDURE — ? COUNSELING

## 2022-11-02 PROCEDURE — 17004 DESTROY PREMAL LESIONS 15/>: CPT

## 2022-11-02 PROCEDURE — 99214 OFFICE O/P EST MOD 30 MIN: CPT | Mod: 25

## 2022-11-02 PROCEDURE — ? ADDITIONAL NOTES

## 2022-11-02 PROCEDURE — ? MEDICATION COUNSELING

## 2022-11-02 PROCEDURE — ? PRESCRIPTION

## 2022-11-02 RX ORDER — TRIAMCINOLONE ACETONIDE 1 MG/G
1 CREAM TOPICAL BID
Qty: 30 | Refills: 1 | Status: ERX | COMMUNITY
Start: 2022-11-02

## 2022-11-02 RX ADMIN — TRIAMCINOLONE ACETONIDE 1: 1 CREAM TOPICAL at 00:00

## 2022-11-02 ASSESSMENT — LOCATION SIMPLE DESCRIPTION DERM
LOCATION SIMPLE: LEFT CHEEK
LOCATION SIMPLE: LEFT FOREHEAD
LOCATION SIMPLE: LEFT LIP
LOCATION SIMPLE: RIGHT BREAST
LOCATION SIMPLE: RIGHT FOREHEAD
LOCATION SIMPLE: RIGHT TEMPLE
LOCATION SIMPLE: RIGHT FOREARM
LOCATION SIMPLE: CHEST
LOCATION SIMPLE: NOSE
LOCATION SIMPLE: LEFT HAND
LOCATION SIMPLE: ABDOMEN
LOCATION SIMPLE: LEFT UPPER BACK
LOCATION SIMPLE: RIGHT CHEEK
LOCATION SIMPLE: RIGHT LIP
LOCATION SIMPLE: RIGHT HAND

## 2022-11-02 ASSESSMENT — LOCATION ZONE DERM
LOCATION ZONE: TRUNK
LOCATION ZONE: FACE
LOCATION ZONE: ARM
LOCATION ZONE: NOSE
LOCATION ZONE: LIP
LOCATION ZONE: HAND

## 2022-11-02 ASSESSMENT — LOCATION DETAILED DESCRIPTION DERM
LOCATION DETAILED: RIGHT FOREHEAD
LOCATION DETAILED: RIGHT RADIAL DORSAL HAND
LOCATION DETAILED: LEFT INFERIOR LATERAL FOREHEAD
LOCATION DETAILED: LEFT INFERIOR MEDIAL MALAR CHEEK
LOCATION DETAILED: RIGHT SUPERIOR CENTRAL BUCCAL CHEEK
LOCATION DETAILED: RIGHT INFERIOR FOREHEAD
LOCATION DETAILED: RIGHT MEDIAL BREAST 4-5:00 REGION
LOCATION DETAILED: RIGHT SUPERIOR MEDIAL BUCCAL CHEEK
LOCATION DETAILED: NASAL DORSUM
LOCATION DETAILED: RIGHT UPPER CUTANEOUS LIP
LOCATION DETAILED: RIGHT MEDIAL FOREHEAD
LOCATION DETAILED: PERIUMBILICAL SKIN
LOCATION DETAILED: RIGHT MEDIAL SUPERIOR CHEST
LOCATION DETAILED: LEFT UPPER CUTANEOUS LIP
LOCATION DETAILED: EPIGASTRIC SKIN
LOCATION DETAILED: RIGHT CENTRAL BUCCAL CHEEK
LOCATION DETAILED: LEFT ULNAR DORSAL HAND
LOCATION DETAILED: RIGHT DISTAL DORSAL FOREARM
LOCATION DETAILED: LEFT MID-UPPER BACK
LOCATION DETAILED: RIGHT INFERIOR LATERAL FOREHEAD
LOCATION DETAILED: RIGHT CENTRAL TEMPLE

## 2022-11-02 NOTE — PROCEDURE: LIQUID NITROGEN
Consent: The patient's consent was obtained including but not limited to risks of crusting, scabbing, blistering, scarring, darker or lighter pigmentary change, recurrence, incomplete removal and infection.
Show Aperture Variable?: Yes
Render Note In Bullet Format When Appropriate: No
Application Tool (Optional): Cry-AC
Duration Of Freeze Thaw-Cycle (Seconds): 3
Post-Care Instructions: I reviewed with the patient in detail post-care instructions. Patient is to wear sunprotection, and avoid picking at any of the treated lesions. Pt may apply Vaseline to crusted or scabbing areas.
Detail Level: Detailed
Number Of Freeze-Thaw Cycles: 3 freeze-thaw cycles

## 2022-11-02 NOTE — PROCEDURE: ADDITIONAL NOTES
Detail Level: Simple
Additional Notes: Will refer for Red-Light treatment.  Patient takes Acyclovir daily as suppressive therapy
Render Risk Assessment In Note?: no

## 2022-12-29 ENCOUNTER — HOSPITAL ENCOUNTER (OUTPATIENT)
Dept: RADIOLOGY | Facility: MEDICAL CENTER | Age: 62
End: 2022-12-29
Attending: INTERNAL MEDICINE
Payer: COMMERCIAL

## 2022-12-29 DIAGNOSIS — Z12.31 VISIT FOR SCREENING MAMMOGRAM: ICD-10-CM

## 2022-12-29 PROCEDURE — 77063 BREAST TOMOSYNTHESIS BI: CPT

## 2023-01-04 DIAGNOSIS — Z80.3 FAMILY HISTORY OF MALIGNANT NEOPLASM OF BREAST: ICD-10-CM

## 2023-01-04 DIAGNOSIS — R92.2 DENSE BREASTS: ICD-10-CM

## 2023-01-04 DIAGNOSIS — R92.30 DENSE BREASTS: ICD-10-CM

## 2023-01-11 ENCOUNTER — TELEPHONE (OUTPATIENT)
Dept: MEDICAL GROUP | Facility: PHYSICIAN GROUP | Age: 63
End: 2023-01-11
Payer: COMMERCIAL

## 2023-01-11 NOTE — TELEPHONE ENCOUNTER
DOCUMENTATION OF PAR STATUS:    1. Name of Medication & Dose: Effexor 150mg cap     2. Name of Prescription Coverage Company & phone #: [employer]    3. Date Prior Auth Submitted: 01/11/23    4. What information was given to obtain insurance decision? Cover My Meds    5. Prior Auth Status? Pending    6. Patient Notified: yes

## 2023-01-16 NOTE — TELEPHONE ENCOUNTER
FINAL PRIOR AUTHORIZATION STATUS:    1.  Name of Medication & Dose: Effexor     2. Prior Auth Status: Denied.  Reason: pt's plan does not pay for brand name Effexor .  Covered alternative is generic venlafaxine.    3. Action Taken: Pharmacy Notified: no Patient Notified: no

## 2023-02-07 ENCOUNTER — TELEPHONE (OUTPATIENT)
Dept: MEDICAL GROUP | Facility: PHYSICIAN GROUP | Age: 63
End: 2023-02-07
Payer: COMMERCIAL

## 2023-02-17 ENCOUNTER — OFFICE VISIT (OUTPATIENT)
Dept: MEDICAL GROUP | Facility: PHYSICIAN GROUP | Age: 63
End: 2023-02-17
Payer: COMMERCIAL

## 2023-02-17 VITALS
SYSTOLIC BLOOD PRESSURE: 132 MMHG | TEMPERATURE: 98.2 F | HEART RATE: 74 BPM | OXYGEN SATURATION: 98 % | HEIGHT: 66 IN | DIASTOLIC BLOOD PRESSURE: 80 MMHG | WEIGHT: 178.38 LBS | RESPIRATION RATE: 20 BRPM | BODY MASS INDEX: 28.67 KG/M2

## 2023-02-17 DIAGNOSIS — M79.674 PAIN OF TOE OF RIGHT FOOT: ICD-10-CM

## 2023-02-17 PROCEDURE — 99213 OFFICE O/P EST LOW 20 MIN: CPT | Performed by: NURSE PRACTITIONER

## 2023-02-17 ASSESSMENT — PATIENT HEALTH QUESTIONNAIRE - PHQ9: CLINICAL INTERPRETATION OF PHQ2 SCORE: 0

## 2023-02-17 ASSESSMENT — FIBROSIS 4 INDEX: FIB4 SCORE: 1.24

## 2023-02-17 NOTE — ASSESSMENT & PLAN NOTE
Acute and worsening. She states that for the past month she has noticed some pain to her right second toe on her right foot. She states that she use to wear a toe ring for years and a few years ago she decided to get it cut off because it was so tight on her right second toe. She states that for the past week she has noticed a stabbing pain all the time that will radiate into the top of her foot. She has tried OTC Ibuprofen and used topical BioFreeze but has not had much improvement with the pain.

## 2023-02-17 NOTE — PROGRESS NOTES
Subjective  Chief Complaint  Toe Pain    History of Present Illness  Lizy Saleem is a 62 y.o. female. This established patient is here today to discuss toe pain.    Her PCP is Dr. Kathy Talavera.    Pain of toe of right foot  Acute and worsening. She states that for the past month she has noticed some pain to her right second toe on her right foot. She states that she use to wear a toe ring for years and a few years ago she decided to get it cut off because it was so tight on her right second toe. She states that for the past week she has noticed a stabbing pain all the time that will radiate into the top of her foot. She has tried OTC Ibuprofen and used topical BioFreeze but has not had much improvement with the pain.    Past Medical History    Allergies: Patient has no known allergies.  Past Medical History:   Diagnosis Date    Acquired hypothyroidism 11/1/2018    Anesthesia     PONV    Anxiety reaction 11/1/2018    Chronic diarrhea 11/18/2020    Chronic midline low back pain without sciatica 11/18/2020    Family history of abdominal aortic aneurysm 2/8/2019    Herpes labialis 2/8/2019    Evans's neuroma of left foot 11/8/2018     Past Surgical History:   Procedure Laterality Date    HYSTEROSCOPY WITH VIDEO DIAGNOSTIC  10/23/2015    Procedure: HYSTEROSCOPY WITH VIDEO DIAGNOSTIC;  Surgeon: Lesli Frias M.D.;  Location: SURGERY [facility];  Service:     DILATION AND CURETTAGE  10/23/2015    Procedure: DILATION AND CURETTAGE;  Surgeon: Lesli Frias M.D.;  Location: SURGERY [facility];  Service:     BUNIONECTOMY Right 2003    BUNIONECTOMY Left 2000    ARTHROSCOPY, KNEE Left 1992     Current Outpatient Medications Ordered in Epic   Medication Sig Dispense Refill    EFFEXOR  MG extended-release capsule TAKE 1 CAPSULE BY MOUTH EVERY DAY. BRAND NAME 90 Capsule 3    levothyroxine (SYNTHROID) 75 MCG Tab Take 1 Tablet by mouth every morning on an empty stomach. 90 Tablet 3     "celecoxib (CELEBREX) 200 MG Cap TAKE 1 CAPSULE BY MOUTH TWICE A  Capsule 1    valacyclovir (VALTREX) 1 GM Tab TAKE 1 TABLET BY MOUTH EVERY DAY 90 Tablet 3    NON SPECIFIED C-E2/E3 25 mg/mlTake 0.1ml SL daily 5 Each 1    Non Formulary Request C-progesterone 60 mg/gm CR - apply 4 clicks topically daily 90 Each 3     No current Epic-ordered facility-administered medications on file.     Family History:    Family History   Problem Relation Age of Onset    Breast Cancer Paternal Aunt     Cancer Father     Hyperlipidemia Father     Hypertension Father     Other Father         multiple aneurisms    Thyroid Mother     Cancer Mother         melanoma skin and eye    Lung Disease Mother     Cancer Cousin       Personal/Social History:    Social History     Tobacco Use    Smoking status: Never    Smokeless tobacco: Never   Vaping Use    Vaping Use: Never used   Substance Use Topics    Alcohol use: Yes     Comment: 1xmonth    Drug use: No     Social History     Social History Narrative    Not on file      Review of Systems:   General: Negative for fever/chills and unexpected weight change.   Respiratory:  Negative for cough and dyspnea.    Cardiovascular:  Negative for chest pain and palpitations.  Musculoskeletal: Positive for toe pain.  Skin:  Negative for rash.     Objective  Physical Exam:   /80 (BP Location: Left arm, Patient Position: Sitting, BP Cuff Size: Adult)   Pulse 74   Temp 36.8 °C (98.2 °F) (Temporal)   Resp 20   Ht 1.676 m (5' 6\")   Wt 80.9 kg (178 lb 6 oz)   SpO2 98%  Body mass index is 28.79 kg/m².  General:  Alert and oriented.  Well appearing.  NAD  Neck: Supple without JVD. No lymphadenopathy.  Pulmonary:  Normal effort.  Clear to ausculation without rales, ronchi, or wheezing.  Cardiovascular:  Regular rate and rhythm without murmur, rubs or gallop.   Skin:  Warm and dry.  No obvious lesions.  Musculoskeletal: Right second toe tender to palpation, no erythema or swelling " noted.      Assessment/Plan  1. Pain of toe of right foot  Acute and worsening.  Discussed with her about a referral to Podiatry since she saw one in the past and got injections for it, she is agreeable and would like a referral placed at this time.  Discussed trying heat or ice to her toe to see if that helps with the pain, she verbalized understanding.  - Referral to Podiatry      Health Maintenance: Completed    Return if symptoms worsen or fail to improve.    Discussed that the patient carries some responsibility in management of their health care.    Please note that this dictation was created using voice recognition software. I have made every reasonable attempt to correct obvious errors, but I expect that there are errors of grammar and possibly content that I did not discover before finalizing the note.    KISHOR Read  [facility]

## 2023-02-22 DIAGNOSIS — G89.29 CHRONIC MIDLINE LOW BACK PAIN WITHOUT SCIATICA: ICD-10-CM

## 2023-02-22 DIAGNOSIS — M54.50 CHRONIC MIDLINE LOW BACK PAIN WITHOUT SCIATICA: ICD-10-CM

## 2023-02-23 ENCOUNTER — TELEMEDICINE (OUTPATIENT)
Dept: MEDICAL GROUP | Facility: PHYSICIAN GROUP | Age: 63
End: 2023-02-23
Payer: COMMERCIAL

## 2023-02-23 VITALS — HEIGHT: 66 IN | WEIGHT: 178 LBS | BODY MASS INDEX: 28.61 KG/M2 | RESPIRATION RATE: 16 BRPM

## 2023-02-23 DIAGNOSIS — F41.1 GAD (GENERALIZED ANXIETY DISORDER): ICD-10-CM

## 2023-02-23 DIAGNOSIS — E03.9 ACQUIRED HYPOTHYROIDISM: ICD-10-CM

## 2023-02-23 DIAGNOSIS — M25.50 PAIN IN JOINT, MULTIPLE SITES: ICD-10-CM

## 2023-02-23 PROCEDURE — 99214 OFFICE O/P EST MOD 30 MIN: CPT | Mod: 95 | Performed by: INTERNAL MEDICINE

## 2023-02-23 ASSESSMENT — FIBROSIS 4 INDEX: FIB4 SCORE: 1.24

## 2023-02-23 NOTE — PROGRESS NOTES
Virtual Visit: Established Patient   This visit was conducted via Zoom using secure and encrypted videoconferencing technology. The patient was in a private location in the state of [place].    The patient's identity was confirmed and verbal consent was obtained for this virtual visit.    Subjective:   CC:   Chief Complaint   Patient presents with    Medication Management     Pharmacy does not have the brand name Effexor, generic not the same. Other options.     Requesting Labs     HLA antibodies ([employer] [place] message 2/22/23) increase inflammation     Other     3D US breast, Machine is broken. Alternative imaging.        Lizy Saleem is a 62 y.o. female presenting for evaluation and management of:    Pain in joint, multiple sites  The patient has longstanding diffuse arthralgias.  She would like to be tested for  rheumatologic conditions.  Her arthralgias are not associated with swollen or erythematous joints.  She also has chronic lower back pain.    Acquired hypothyroidism  The patient currently takes levothyroxine 75 mcg daily.  She is due for updated labs which have previously been ordered.    DERREK (generalized anxiety disorder)  The patient requires Effexor (brand name) 150 mg ER daily to effectively control her symptoms.  She reports she has been having difficulty remaining on the brand-name medication secondary to prohibitive cost as well as getting the brand name medication.  Previously used discount cards are now associated with a higher co-pay.  The patient wonders if it would be better for her to switch to a different antianxiety medication.        ROS   Denies any recent fevers or chills. No nausea or vomiting. No chest pains or shortness of breath.     No Known Allergies    Current medicines (including changes today)  Current Outpatient Medications   Medication Sig Dispense Refill    EFFEXOR  MG extended-release capsule TAKE 1 CAPSULE BY MOUTH EVERY DAY. BRAND NAME 90 Capsule 3     levothyroxine (SYNTHROID) 75 MCG Tab Take 1 Tablet by mouth every morning on an empty stomach. 90 Tablet 3    celecoxib (CELEBREX) 200 MG Cap TAKE 1 CAPSULE BY MOUTH TWICE A  Capsule 1    valacyclovir (VALTREX) 1 GM Tab TAKE 1 TABLET BY MOUTH EVERY DAY 90 Tablet 3    NON SPECIFIED C-E2/E3 25 mg/mlTake 0.1ml SL daily 5 Each 1    Non Formulary Request C-progesterone 60 mg/gm CR - apply 4 clicks topically daily 90 Each 3     No current facility-administered medications for this visit.       Patient Active Problem List    Diagnosis Date Noted    Pain of toe of right foot 02/17/2023    Overweight with body mass index (BMI) 25.0-29.9 06/19/2022    Chronic pain of both knees 06/19/2022    Recurrent HSV (herpes simplex virus) 06/19/2022    Dense breasts 01/21/2022    Hormone replacement therapy (HRT) 01/21/2022    Arthritis 01/12/2021    Chronic diarrhea 11/18/2020    Chronic midline low back pain without sciatica 11/18/2020    Herpes labialis 02/08/2019    Family history of abdominal aortic aneurysm 02/08/2019    Evans's neuroma of left foot 11/08/2018    Anxiety reaction 11/01/2018    Acquired hypothyroidism 11/01/2018    Menopausal syndrome (hot flashes) 11/01/2018    Other sleep apnea 11/01/2018    Restless legs 11/01/2018    Family history of malignant neoplasm of breast 11/17/2017       Family History   Problem Relation Age of Onset    Breast Cancer Paternal Aunt     Cancer Father     Hyperlipidemia Father     Hypertension Father     Other Father         multiple aneurisms    Thyroid Mother     Cancer Mother         melanoma skin and eye    Lung Disease Mother     Cancer Cousin        She  has a past medical history of Acquired hypothyroidism (11/1/2018), Anesthesia, Anxiety reaction (11/1/2018), Chronic diarrhea (11/18/2020), Chronic midline low back pain without sciatica (11/18/2020), Family history of abdominal aortic aneurysm (2/8/2019), Herpes labialis (2/8/2019), and Evans's neuroma of left foot  "(11/8/2018).  She  has a past surgical history that includes bunionectomy (Left, 2000); bunionectomy (Right, 2003); arthroscopy, knee (Left, 1992); hysteroscopy with video diagnostic (10/23/2015); and dilation and curettage (10/23/2015).       Objective:   Resp 16   Ht 1.676 m (5' 6\") Comment: per pt  Wt 80.7 kg (178 lb) Comment: per pt  BMI 28.73 kg/m²     Physical Exam:  Constitutional: Alert, no distress, well-groomed.  Skin: No rashes in visible areas.  Eye: Round. Conjunctiva clear, lids normal. No icterus.   ENMT: Lips pink without lesions, good dentition, moist mucous membranes. Phonation normal.  Neck: No masses, no thyromegaly. Moves freely without pain.  Respiratory: Unlabored respiratory effort, no cough or audible wheeze  Psych: Alert and oriented x3, normal affect and mood.       Assessment and Plan:   The following treatment plan was discussed:     Pain in joint, multiple sites  Chronic medical condition.  The patient has longstanding diffuse arthralgias.  She would like to be tested for  rheumatologic conditions.  Her arthralgias are not associated with swollen or erythematous joints.  She also has chronic lower back pain.  - MONSE ANTIBODY WITH REFLEX; Future  - RHEUMATOID ARTHRITIS FACTOR; Future  - CCP ANTIBODY; Future  - Sed Rate; Future  - CRP QUANTITIVE (NON-CARDIAC); Future    Acquired hypothyroidism  Chronic medical condition.  Ongoing and well controlled.  The patient currently takes levothyroxine 75 mcg daily.  She is clinically euthyroid.  Labs from 4/6/2022 showed a TSH of 1.88 and a free T41.07.  The patient is due for updated labs.  -Continue levothyroxine 75 mcg daily   -Updated thyroid labs ordered at previous visit    DERREK (generalized anxiety disorder)  Chronic medical condition.  Ongoing and well controlled.  The patient requires Effexor (brand name) 150 mg ER daily to effectively control her symptoms.  She reports she has been having difficulty remaining on the brand-name " medication secondary to prohibitive cost as well as getting the brand name medication.  Previously used discount cards are now associated with a higher co-pay.  The patient wonders if it would be better for her to switch to a different antianxiety medication.  Recommended patient contact the  of the Effexor to see if they have any discount programs and to check with her insurance regarding the availability of brand-name Effexor through a mail order pharmacy  -Continue Effexor (brand name) 150 mg ER daily       Follow-up: Return in about 1 year (around 2/23/2024) for Annual/Wellness Visit.    Please note that this dictation was created using voice recognition software. I have made every reasonable attempt to correct obvious errors, but I expect that there are errors of grammar and possibly content that I did not discover before finalizing the note.

## 2023-03-21 ENCOUNTER — HOSPITAL ENCOUNTER (OUTPATIENT)
Dept: LAB | Facility: MEDICAL CENTER | Age: 63
End: 2023-03-21
Attending: INTERNAL MEDICINE
Payer: COMMERCIAL

## 2023-03-21 DIAGNOSIS — G89.29 CHRONIC MIDLINE LOW BACK PAIN WITHOUT SCIATICA: ICD-10-CM

## 2023-03-21 DIAGNOSIS — E03.9 ACQUIRED HYPOTHYROIDISM: ICD-10-CM

## 2023-03-21 DIAGNOSIS — M54.50 CHRONIC MIDLINE LOW BACK PAIN WITHOUT SCIATICA: ICD-10-CM

## 2023-03-21 DIAGNOSIS — M25.50 PAIN IN JOINT, MULTIPLE SITES: ICD-10-CM

## 2023-03-21 LAB
CRP SERPL HS-MCNC: <0.3 MG/DL (ref 0–0.75)
ERYTHROCYTE [SEDIMENTATION RATE] IN BLOOD BY WESTERGREN METHOD: 7 MM/HOUR (ref 0–25)

## 2023-03-21 PROCEDURE — 36415 COLL VENOUS BLD VENIPUNCTURE: CPT

## 2023-03-21 PROCEDURE — 86256 FLUORESCENT ANTIBODY TITER: CPT

## 2023-03-21 PROCEDURE — 86039 ANTINUCLEAR ANTIBODIES (ANA): CPT

## 2023-03-21 PROCEDURE — 86431 RHEUMATOID FACTOR QUANT: CPT

## 2023-03-21 PROCEDURE — 84443 ASSAY THYROID STIM HORMONE: CPT

## 2023-03-21 PROCEDURE — 86140 C-REACTIVE PROTEIN: CPT

## 2023-03-21 PROCEDURE — 85652 RBC SED RATE AUTOMATED: CPT

## 2023-03-21 PROCEDURE — 86225 DNA ANTIBODY NATIVE: CPT

## 2023-03-21 PROCEDURE — 86812 HLA TYPING A B OR C: CPT

## 2023-03-21 PROCEDURE — 84439 ASSAY OF FREE THYROXINE: CPT

## 2023-03-21 PROCEDURE — 86235 NUCLEAR ANTIGEN ANTIBODY: CPT | Mod: 91

## 2023-03-21 PROCEDURE — 86038 ANTINUCLEAR ANTIBODIES: CPT

## 2023-03-21 PROCEDURE — 86200 CCP ANTIBODY: CPT

## 2023-03-22 LAB
RHEUMATOID FACT SER IA-ACNC: <10 IU/ML (ref 0–14)
T4 FREE SERPL-MCNC: 1.13 NG/DL (ref 0.93–1.7)
TSH SERPL DL<=0.005 MIU/L-ACNC: 1.17 UIU/ML (ref 0.38–5.33)

## 2023-03-23 LAB
CCP IGG SERPL-ACNC: 3 UNITS (ref 0–19)
HLA-B27 QL FC: NEGATIVE
NUCLEAR IGG SER QL IA: DETECTED

## 2023-03-24 LAB
ANA INTERPRETIVE COMMENT Q5143: ABNORMAL
ANA PATTERN Q5144: ABNORMAL
ANA TITER Q5145: ABNORMAL
ANTINUCLEAR ANTIBODY (ANA), HEP-2, IGG Q5142: DETECTED

## 2023-03-25 DIAGNOSIS — Z80.3 FAMILY HISTORY OF MALIGNANT NEOPLASM OF BREAST: ICD-10-CM

## 2023-03-25 DIAGNOSIS — Z79.890 HORMONE REPLACEMENT THERAPY (HRT): ICD-10-CM

## 2023-03-25 DIAGNOSIS — R92.2 DENSE BREASTS: ICD-10-CM

## 2023-03-25 DIAGNOSIS — R92.30 DENSE BREASTS: ICD-10-CM

## 2023-03-26 DIAGNOSIS — M79.671 BILATERAL FOOT PAIN: ICD-10-CM

## 2023-03-26 DIAGNOSIS — M79.672 BILATERAL FOOT PAIN: ICD-10-CM

## 2023-03-26 DIAGNOSIS — M79.641 BILATERAL HAND PAIN: ICD-10-CM

## 2023-03-26 DIAGNOSIS — M79.642 BILATERAL HAND PAIN: ICD-10-CM

## 2023-03-26 LAB — DSDNA AB TITR SER CLIF: NORMAL {TITER}

## 2023-03-27 LAB — DSDNA IGG TITR SER CLIF: ABNORMAL {TITER}

## 2023-03-27 NOTE — PROGRESS NOTES
CC: There were no encounter diagnoses.    Subjective                                                                                                                                       HPI:   Lizy presents today with the following concerns:    There are no diagnoses linked to this encounter.     Patient Active Problem List    Diagnosis Date Noted    Pain of toe of right foot 02/17/2023    Overweight with body mass index (BMI) 25.0-29.9 06/19/2022    Chronic pain of both knees 06/19/2022    Recurrent HSV (herpes simplex virus) 06/19/2022    Dense breasts 01/21/2022    Hormone replacement therapy (HRT) 01/21/2022    Arthritis 01/12/2021    Chronic diarrhea 11/18/2020    Chronic midline low back pain without sciatica 11/18/2020    Herpes labialis 02/08/2019    Family history of abdominal aortic aneurysm 02/08/2019    Evans's neuroma of left foot 11/08/2018    DERREK (generalized anxiety disorder) 11/01/2018    Acquired hypothyroidism 11/01/2018    Menopausal syndrome (hot flashes) 11/01/2018    Other sleep apnea 11/01/2018    Restless legs 11/01/2018    Family history of malignant neoplasm of breast 11/17/2017       Current Outpatient Medications   Medication Sig Dispense Refill    EFFEXOR  MG extended-release capsule TAKE 1 CAPSULE BY MOUTH EVERY DAY. BRAND NAME 90 Capsule 3    levothyroxine (SYNTHROID) 75 MCG Tab Take 1 Tablet by mouth every morning on an empty stomach. 90 Tablet 3    celecoxib (CELEBREX) 200 MG Cap TAKE 1 CAPSULE BY MOUTH TWICE A  Capsule 1    valacyclovir (VALTREX) 1 GM Tab TAKE 1 TABLET BY MOUTH EVERY DAY 90 Tablet 3    NON SPECIFIED C-E2/E3 25 mg/mlTake 0.1ml SL daily 5 Each 1    Non Formulary Request C-progesterone 60 mg/gm CR - apply 4 clicks topically daily 90 Each 3     No current facility-administered medications for this visit.         Allergies as of 03/26/2023    (No Known Allergies)          Objective      There were no vitals taken for this visit.    Physical  Exam:  Gen:         Alert and oriented, No apparent distress.  Neck:        No Lymphadenopathy or Bruits.  Lungs:     Clear to auscultation bilaterally  CV:          Regular rate and rhythm. No murmurs, rubs or gallops.               Ext:          No clubbing, cyanosis, edema.    ***    Assessment & Plan    62 y.o. female with the following issues:    No problem-specific Assessment & Plan notes found for this encounter.

## 2023-03-28 LAB — U1 SNRNP IGG SER QL: 2 UNITS (ref 0–19)

## 2023-03-29 LAB
ENA JO1 AB TITR SER: 0 AU/ML (ref 0–40)
ENA SCL70 IGG SER QL: 0 AU/ML (ref 0–40)
ENA SM IGG SER-ACNC: 0 AU/ML (ref 0–40)
ENA SS-B IGG SER IA-ACNC: 0 AU/ML (ref 0–40)
SSA52 R0ENA AB IGG Q0420: 0 AU/ML (ref 0–40)
SSA60 R0ENA AB IGG Q0419: 0 AU/ML (ref 0–40)

## 2023-04-02 NOTE — PROCEDURE: MEDICATION COUNSELING
Goal Outcome Evaluation:  Plan of Care Reviewed With: patient        Progress: no change  Outcome Evaluation: Patient has done well today.  Wound care tolerated well.  VSS.  Continue current plan of care.  
Adbry Counseling: I discussed with the patient the risks of tralokinumab including but not limited to eye infection and irritation, cold sores, injection site reactions, worsening of asthma, allergic reactions and increased risk of parasitic infection.  Live vaccines should be avoided while taking tralokinumab. The patient understands that monitoring is required and they must alert us or the primary physician if symptoms of infection or other concerning signs are noted.
Xolair Counseling:  Patient informed of potential adverse effects including but not limited to fever, muscle aches, rash and allergic reactions.  The patient verbalized understanding of the proper use and possible adverse effects of Xolair.  All of the patient's questions and concerns were addressed.
VTAMA Counseling: I discussed with the patient that VTAMA is not for use in the eyes, mouth or mouth. They should call the office if they develop any signs of allergic reactions to VTAMA. The patient verbalized understanding of the proper use and possible adverse effects of VTAMA.  All of the patient's questions and concerns were addressed.
Cantharidin Counseling: Calcipotriene Counseling:  I discussed with the patient the risks of calcipotriene including but not limited to erythema, scaling, itching, and irritation.
Rifampin Counseling: I discussed with the patient the risks of rifampin including but not limited to liver damage, kidney damage, red-orange body fluids, nausea/vomiting and severe allergy.
Oral Minoxidil Pregnancy And Lactation Text: This medication should only be used when clearly needed if you are pregnant, attempting to become pregnant or breast feeding.
Mirvaso Counseling: Mirvaso is a topical medication which can decrease superficial blood flow where applied. Side effects are uncommon and include stinging, redness and allergic reactions.
Clofazimine Counseling:  I discussed with the patient the risks of clofazimine including but not limited to skin and eye pigmentation, liver damage, nausea/vomiting, gastrointestinal bleeding and allergy.
Sotyktu Counseling:  I discussed the most common side effects of Sotyktu including: common cold, sore throat, sinus infections, cold sores, canker sores, folliculitis, and acne.  I also discussed more serious side effects of Sotyktu including but not limited to: serious allergic reactions; increased risk for infections such as TB; cancers such as lymphomas; rhabdomyolysis and elevated CPK; and elevated triglycerides and liver enzymes. 
Eucrisa Pregnancy And Lactation Text: This medication has not been assigned a Pregnancy Risk Category but animal studies failed to show danger with the topical medication. It is unknown if the medication is excreted in breast milk.
Enbrel Pregnancy And Lactation Text: This medication is Pregnancy Category B and is considered safe during pregnancy. It is unknown if this medication is excreted in breast milk.
Thalidomide Counseling: I discussed with the patient the risks of thalidomide including but not limited to birth defects, anxiety, weakness, chest pain, dizziness, cough and severe allergy.
Qbrexza Pregnancy And Lactation Text: There is no available data on Qbrexza use in pregnant women.  There is no available data on Qbrexza use in lactation.
Hydroquinone Counseling:  Patient advised that medication may result in skin irritation, lightening (hypopigmentation), dryness, and burning.  In the event of skin irritation, the patient was advised to reduce the amount of the drug applied or use it less frequently.  Rarely, spots that are treated with hydroquinone can become darker (pseudoochronosis).  Should this occur, patient instructed to stop medication and call the office. The patient verbalized understanding of the proper use and possible adverse effects of hydroquinone.  All of the patient's questions and concerns were addressed.
Benzoyl Peroxide Counseling: Patient counseled that medicine may cause skin irritation and bleach clothing.  In the event of skin irritation, the patient was advised to reduce the amount of the drug applied or use it less frequently.   The patient verbalized understanding of the proper use and possible adverse effects of benzoyl peroxide.  All of the patient's questions and concerns were addressed.
Azathioprine Pregnancy And Lactation Text: This medication is Pregnancy Category D and isn't considered safe during pregnancy. It is unknown if this medication is excreted in breast milk.
Prednisone Counseling:  I discussed with the patient the risks of prolonged use of prednisone including but not limited to weight gain, insomnia, osteoporosis, mood changes, diabetes, susceptibility to infection, glaucoma and high blood pressure.  In cases where prednisone use is prolonged, patients should be monitored with blood pressure checks, serum glucose levels and an eye exam.  Additionally, the patient may need to be placed on GI prophylaxis, PCP prophylaxis, and calcium and vitamin D supplementation and/or a bisphosphonate.  The patient verbalized understanding of the proper use and the possible adverse effects of prednisone.  All of the patient's questions and concerns were addressed.
Rhofade Counseling: Rhofade is a topical medication which can decrease superficial blood flow where applied. Side effects are uncommon and include stinging, redness and allergic reactions.
Thalidomide Pregnancy And Lactation Text: This medication is Pregnancy Category X and is absolutely contraindicated during pregnancy. It is unknown if it is excreted in breast milk.
Siliq Pregnancy And Lactation Text: The risk during pregnancy and breastfeeding is uncertain with this medication.
Topical Clindamycin Pregnancy And Lactation Text: This medication is Pregnancy Category B and is considered safe during pregnancy. It is unknown if it is excreted in breast milk.
Doxycycline Pregnancy And Lactation Text: This medication is Pregnancy Category D and not consider safe during pregnancy. It is also excreted in breast milk but is considered safe for shorter treatment courses.
Isotretinoin Pregnancy And Lactation Text: This medication is Pregnancy Category X and is considered extremely dangerous during pregnancy. It is unknown if it is excreted in breast milk.
Low Dose Naltrexone Counseling- I discussed with the patient the potential risks and side effects of low dose naltrexone including but not limited to: more vivid dreams, headaches, nausea, vomiting, abdominal pain, fatigue, dizziness, and anxiety.
Doxepin Counseling:  Patient advised that the medication is sedating and not to drive a car after taking this medication. Patient informed of potential adverse effects including but not limited to dry mouth, urinary retention, and blurry vision.  The patient verbalized understanding of the proper use and possible adverse effects of doxepin.  All of the patient's questions and concerns were addressed.
Dutasteride Male Counseling: Dustasteride Counseling:  I discussed with the patient the risks of use of dutasteride including but not limited to decreased libido, decreased ejaculate volume, and gynecomastia. Women who can become pregnant should not handle medication.  All of the patient's questions and concerns were addressed.
Griseofulvin Pregnancy And Lactation Text: This medication is Pregnancy Category X and is known to cause serious birth defects. It is unknown if this medication is excreted in breast milk but breast feeding should be avoided.
Doxepin Pregnancy And Lactation Text: This medication is Pregnancy Category C and it isn't known if it is safe during pregnancy. It is also excreted in breast milk and breast feeding isn't recommended.
Erythromycin Counseling:  I discussed with the patient the risks of erythromycin including but not limited to GI upset, allergic reaction, drug rash, diarrhea, increase in liver enzymes, and yeast infections.
Skyrizi Counseling: I discussed with the patient the risks of risankizumab-rzaa including but not limited to immunosuppression, and serious infections.  The patient understands that monitoring is required including a PPD at baseline and must alert us or the primary physician if symptoms of infection or other concerning signs are noted.
Itraconazole Counseling:  I discussed with the patient the risks of itraconazole including but not limited to liver damage, nausea/vomiting, neuropathy, and severe allergy.  The patient understands that this medication is best absorbed when taken with acidic beverages such as non-diet cola or ginger ale.  The patient understands that monitoring is required including baseline LFTs and repeat LFTs at intervals.  The patient understands that they are to contact us or the primary physician if concerning signs are noted.
Xolair Pregnancy And Lactation Text: This medication is Pregnancy Category B and is considered safe during pregnancy. This medication is excreted in breast milk.
Vtama Pregnancy And Lactation Text: It is unknown if this medication can cause problems during pregnancy and breastfeeding.
Sotyktu Pregnancy And Lactation Text: There is insufficient data to evaluate whether or not Sotyktu is safe to use during pregnancy.   It is not known if Sotyktu passes into breast milk and whether or not it is safe to use when breastfeeding.  
Adbry Pregnancy And Lactation Text: It is unknown if this medication will adversely affect pregnancy or breast feeding.
Low Dose Naltrexone Pregnancy And Lactation Text: Naltrexone is pregnancy category C.  There have been no adequate and well-controlled studies in pregnant women.  It should be used in pregnancy only if the potential benefit justifies the potential risk to the fetus.   Limited data indicates that naltrexone is minimally excreted into breastmilk.
Dutasteride Pregnancy And Lactation Text: This medication is absolutely contraindicated in women, especially during pregnancy and breast feeding. Feminization of male fetuses is possible if taking while pregnant.
Cantharidin Pregnancy And Lactation Text: The use of this medication during pregnancy or lactation is not recommended as there is insufficient data.
Humira Counseling:  I discussed with the patient the risks of adalimumab including but not limited to myelosuppression, immunosuppression, autoimmune hepatitis, demyelinating diseases, lymphoma, and serious infections.  The patient understands that monitoring is required including a PPD at baseline and must alert us or the primary physician if symptoms of infection or other concerning signs are noted.
Otezla Counseling: The side effects of Otezla were discussed with the patient, including but not limited to worsening or new depression, weight loss, diarrhea, nausea, upper respiratory tract infection, and headache. Patient instructed to call the office should any adverse effect occur.  The patient verbalized understanding of the proper use and possible adverse effects of Otezla.  All the patient's questions and concerns were addressed.
Rifampin Pregnancy And Lactation Text: This medication is Pregnancy Category C and it isn't know if it is safe during pregnancy. It is also excreted in breast milk and should not be used if you are breast feeding.
Mirvaso Pregnancy And Lactation Text: This medication has not been assigned a Pregnancy Risk Category. It is unknown if the medication is excreted in breast milk.
Clofazimine Pregnancy And Lactation Text: This medication is Pregnancy Category C and isn't considered safe during pregnancy. It is excreted in breast milk.
5-Fu Counseling: 5-Fluorouracil Counseling:  I discussed with the patient the risks of 5-fluorouracil including but not limited to erythema, scaling, itching, weeping, crusting, and pain.
Sarecycline Counseling: Patient advised regarding possible photosensitivity and discoloration of the teeth, skin, lips, tongue and gums.  Patient instructed to avoid sunlight, if possible.  When exposed to sunlight, patients should wear protective clothing, sunglasses, and sunscreen.  The patient was instructed to call the office immediately if the following severe adverse effects occur:  hearing changes, easy bruising/bleeding, severe headache, or vision changes.  The patient verbalized understanding of the proper use and possible adverse effects of sarecycline.  All of the patient's questions and concerns were addressed.
Cellcept Counseling:  I discussed with the patient the risks of mycophenolate mofetil including but not limited to infection/immunosuppression, GI upset, hypokalemia, hypercholesterolemia, bone marrow suppression, lymphoproliferative disorders, malignancy, GI ulceration/bleed/perforation, colitis, interstitial lung disease, kidney failure, progressive multifocal leukoencephalopathy, and birth defects.  The patient understands that monitoring is required including a baseline creatinine and regular CBC testing. In addition, patient must alert us immediately if symptoms of infection or other concerning signs are noted.
Colchicine Counseling:  Patient counseled regarding adverse effects including but not limited to stomach upset (nausea, vomiting, stomach pain, or diarrhea).  Patient instructed to limit alcohol consumption while taking this medication.  Colchicine may reduce blood counts especially with prolonged use.  The patient understands that monitoring of kidney function and blood counts may be required, especially at baseline. The patient verbalized understanding of the proper use and possible adverse effects of colchicine.  All of the patient's questions and concerns were addressed.
Opzelura Counseling:  I discussed with the patient the risks of Opzelura including but not limited to nasopharngitis, bronchitis, ear infection, eosinophila, hives, diarrhea, folliculitis, tonsillitis, and rhinorrhea.  Taken orally, this medication has been linked to serious infections; higher rate of mortality; malignancy and lymphoproliferative disorders; major adverse cardiovascular events; thrombosis; thrombocytopenia, anemia, and neutropenia; and lipid elevations.
Tranexamic Acid Counseling:  Patient advised of the small risk of bleeding problems with tranexamic acid. They were also instructed to call if they developed any nausea, vomiting or diarrhea. All of the patient's questions and concerns were addressed.
Prednisone Pregnancy And Lactation Text: This medication is Pregnancy Category C and it isn't know if it is safe during pregnancy. This medication is excreted in breast milk.
Dapsone Counseling: I discussed with the patient the risks of dapsone including but not limited to hemolytic anemia, agranulocytosis, rashes, methemoglobinemia, kidney failure, peripheral neuropathy, headaches, GI upset, and liver toxicity.  Patients who start dapsone require monitoring including baseline LFTs and weekly CBCs for the first month, then every month thereafter.  The patient verbalized understanding of the proper use and possible adverse effects of dapsone.  All of the patient's questions and concerns were addressed.
High Dose Vitamin A Counseling: Side effects reviewed, pt to contact office should one occur.
Topical Ketoconazole Counseling: Patient counseled that this medication may cause skin irritation or allergic reactions.  In the event of skin irritation, the patient was advised to reduce the amount of the drug applied or use it less frequently.   The patient verbalized understanding of the proper use and possible adverse effects of ketoconazole.  All of the patient's questions and concerns were addressed.
Bactrim Counseling:  I discussed with the patient the risks of sulfa antibiotics including but not limited to GI upset, allergic reaction, drug rash, diarrhea, dizziness, photosensitivity, and yeast infections.  Rarely, more serious reactions can occur including but not limited to aplastic anemia, agranulocytosis, methemoglobinemia, blood dyscrasias, liver or kidney failure, lung infiltrates or desquamative/blistering drug rashes.
Hydroxyzine Counseling: Patient advised that the medication is sedating and not to drive a car after taking this medication.  Patient informed of potential adverse effects including but not limited to dry mouth, urinary retention, and blurry vision.  The patient verbalized understanding of the proper use and possible adverse effects of hydroxyzine.  All of the patient's questions and concerns were addressed.
Finasteride Male Counseling: Finasteride Counseling:  I discussed with the patient the risks of use of finasteride including but not limited to decreased libido, decreased ejaculate volume, gynecomastia, and depression. Women should not handle medication.  All of the patient's questions and concerns were addressed.
Erythromycin Pregnancy And Lactation Text: This medication is Pregnancy Category B and is considered safe during pregnancy. It is also excreted in breast milk.
Itraconazole Pregnancy And Lactation Text: This medication is Pregnancy Category C and it isn't know if it is safe during pregnancy. It is also excreted in breast milk.
Zyclara Counseling:  I discussed with the patient the risks of imiquimod including but not limited to erythema, scaling, itching, weeping, crusting, and pain.  Patient understands that the inflammatory response to imiquimod is variable from person to person and was educated regarded proper titration schedule.  If flu-like symptoms develop, patient knows to discontinue the medication and contact us.
Niacinamide Counseling: I recommended taking niacin or niacinamide, also know as vitamin B3, twice daily. Recent evidence suggests that taking vitamin B3 (500 mg twice daily) can reduce the risk of actinic keratoses and non-melanoma skin cancers. Side effects of vitamin B3 include flushing and headache.
Cimzia Counseling:  I discussed with the patient the risks of Cimzia including but not limited to immunosuppression, allergic reactions and infections.  The patient understands that monitoring is required including a PPD at baseline and must alert us or the primary physician if symptoms of infection or other concerning signs are noted.
Otezla Pregnancy And Lactation Text: This medication is Pregnancy Category C and it isn't known if it is safe during pregnancy. It is unknown if it is excreted in breast milk.
Xeljanz Counseling: I discussed with the patient the risks of Xeljanz therapy including increased risk of infection, liver issues, headache, diarrhea, or cold symptoms. Live vaccines should be avoided. They were instructed to call if they have any problems.
Xeltoroz Pregnancy And Lactation Text: This medication is Pregnancy Category D and is not considered safe during pregnancy.  The risk during breast feeding is also uncertain.
Cimzia Pregnancy And Lactation Text: This medication crosses the placenta but can be considered safe in certain situations. Cimzia may be excreted in breast milk.
Sarecycline Pregnancy And Lactation Text: This medication is Pregnancy Category D and not consider safe during pregnancy. It is also excreted in breast milk.
Imiquimod Counseling:  I discussed with the patient the risks of imiquimod including but not limited to erythema, scaling, itching, weeping, crusting, and pain.  Patient understands that the inflammatory response to imiquimod is variable from person to person and was educated regarded proper titration schedule.  If flu-like symptoms develop, patient knows to discontinue the medication and contact us.
Oxybutynin Counseling:  I discussed with the patient the risks of oxybutynin including but not limited to skin rash, drowsiness, dry mouth, difficulty urinating, and blurred vision.
Ilumya Counseling: I discussed with the patient the risks of tildrakizumab including but not limited to immunosuppression, malignancy, posterior leukoencephalopathy syndrome, and serious infections.  The patient understands that monitoring is required including a PPD at baseline and must alert us or the primary physician if symptoms of infection or other concerning signs are noted.
5-Fu Pregnancy And Lactation Text: This medication is Pregnancy Category X and contraindicated in pregnancy and in women who may become pregnant. It is unknown if this medication is excreted in breast milk.
Solaraze Counseling:  I discussed with the patient the risks of Solaraze including but not limited to erythema, scaling, itching, weeping, crusting, and pain.
Erivedge Counseling- I discussed with the patient the risks of Erivedge including but not limited to nausea, vomiting, diarrhea, constipation, weight loss, changes in the sense of taste, decreased appetite, muscle spasms, and hair loss.  The patient verbalized understanding of the proper use and possible adverse effects of Erivedge.  All of the patient's questions and concerns were addressed.
Opzelura Pregnancy And Lactation Text: There is insufficient data to evaluate drug-associated risk for major birth defects, miscarriage, or other adverse maternal or fetal outcomes.  There is a pregnancy registry that monitors pregnancy outcomes in pregnant persons exposed to the medication during pregnancy.  It is unknown if this medication is excreted in breast milk.  Do not breastfeed during treatment and for about 4 weeks after the last dose.
Tranexamic Acid Pregnancy And Lactation Text: It is unknown if this medication is safe during pregnancy or breast feeding.
Dapsone Pregnancy And Lactation Text: This medication is Pregnancy Category C and is not considered safe during pregnancy or breast feeding.
High Dose Vitamin A Pregnancy And Lactation Text: High dose vitamin A therapy is contraindicated during pregnancy and breast feeding.
Bactrim Pregnancy And Lactation Text: This medication is Pregnancy Category D and is known to cause fetal risk.  It is also excreted in breast milk.
Cyclophosphamide Counseling:  I discussed with the patient the risks of cyclophosphamide including but not limited to hair loss, hormonal abnormalities, decreased fertility, abdominal pain, diarrhea, nausea and vomiting, bone marrow suppression and infection. The patient understands that monitoring is required while taking this medication.
Stelara Counseling:  I discussed with the patient the risks of ustekinumab including but not limited to immunosuppression, malignancy, posterior leukoencephalopathy syndrome, and serious infections.  The patient understands that monitoring is required including a PPD at baseline and must alert us or the primary physician if symptoms of infection or other concerning signs are noted.
Hydroxyzine Pregnancy And Lactation Text: This medication is not safe during pregnancy and should not be taken. It is also excreted in breast milk and breast feeding isn't recommended.
Gabapentin Counseling: I discussed with the patient the risks of gabapentin including but not limited to dizziness, somnolence, fatigue and ataxia.
Valtrex Counseling: I discussed with the patient the risks of valacyclovir including but not limited to kidney damage, nausea, vomiting and severe allergy.  The patient understands that if the infection seems to be worsening or is not improving, they are to call.
Topical Sulfur Applications Counseling: Topical Sulfur Counseling: Patient counseled that this medication may cause skin irritation or allergic reactions.  In the event of skin irritation, the patient was advised to reduce the amount of the drug applied or use it less frequently.   The patient verbalized understanding of the proper use and possible adverse effects of topical sulfur application.  All of the patient's questions and concerns were addressed.
Metronidazole Counseling:  I discussed with the patient the risks of metronidazole including but not limited to seizures, nausea/vomiting, a metallic taste in the mouth, nausea/vomiting and severe allergy.
Detail Level: Simple
Finasteride Pregnancy And Lactation Text: This medication is absolutely contraindicated during pregnancy. It is unknown if it is excreted in breast milk.
Niacinamide Pregnancy And Lactation Text: These medications are considered safe during pregnancy.
Zyclara Pregnancy And Lactation Text: This medication is Pregnancy Category C. It is unknown if this medication is excreted in breast milk.
Ketoconazole Counseling:   Patient counseled regarding improving absorption with orange juice.  Adverse effects include but are not limited to breast enlargement, headache, diarrhea, nausea, upset stomach, liver function test abnormalities, taste disturbance, and stomach pain.  There is a rare possibility of liver failure that can occur when taking ketoconazole. The patient understands that monitoring of LFTs may be required, especially at baseline. The patient verbalized understanding of the proper use and possible adverse effects of ketoconazole.  All of the patient's questions and concerns were addressed.
Cibinqo Counseling: I discussed with the patient the risks of Cibinqo therapy including but not limited to common cold, nausea, headache, cold sores, increased blood CPK levels, dizziness, UTIs, fatigue, acne, and vomitting. Live vaccines should be avoided.  This medication has been linked to serious infections; higher rate of mortality; malignancy and lymphoproliferative disorders; major adverse cardiovascular events; thrombosis; thrombocytopenia and lymphopenia; lipid elevations; and retinal detachment.
Metronidazole Pregnancy And Lactation Text: This medication is Pregnancy Category B and considered safe during pregnancy.  It is also excreted in breast milk.
Cibinqo Pregnancy And Lactation Text: It is unknown if this medication will adversely affect pregnancy or breast feeding.  You should not take this medication if you are currently pregnant or planning a pregnancy or while breastfeeding.
Cosentyx Counseling:  I discussed with the patient the risks of Cosentyx including but not limited to worsening of Crohn's disease, immunosuppression, allergic reactions and infections.  The patient understands that monitoring is required including a PPD at baseline and must alert us or the primary physician if symptoms of infection or other concerning signs are noted.
Drysol Counseling:  I discussed with the patient the risks of drysol/aluminum chloride including but not limited to skin rash, itching, irritation, burning.
Ketoconazole Pregnancy And Lactation Text: This medication is Pregnancy Category C and it isn't know if it is safe during pregnancy. It is also excreted in breast milk and breast feeding isn't recommended.
Benzoyl Peroxide Pregnancy And Lactation Text: This medication is Pregnancy Category C. It is unknown if benzoyl peroxide is excreted in breast milk.
Tetracycline Counseling: Patient counseled regarding possible photosensitivity and increased risk for sunburn.  Patient instructed to avoid sunlight, if possible.  When exposed to sunlight, patients should wear protective clothing, sunglasses, and sunscreen.  The patient was instructed to call the office immediately if the following severe adverse effects occur:  hearing changes, easy bruising/bleeding, severe headache, or vision changes.  The patient verbalized understanding of the proper use and possible adverse effects of tetracycline.  All of the patient's questions and concerns were addressed. Patient understands to avoid pregnancy while on therapy due to potential birth defects.
Picato Counseling:  I discussed with the patient the risks of Picato including but not limited to erythema, scaling, itching, weeping, crusting, and pain.
Solaraze Pregnancy And Lactation Text: This medication is Pregnancy Category B and is considered safe. There is some data to suggest avoiding during the third trimester. It is unknown if this medication is excreted in breast milk.
Klisyri Counseling:  I discussed with the patient the risks of Klisyri including but not limited to erythema, scaling, itching, weeping, crusting, and pain.
Infliximab Counseling:  I discussed with the patient the risks of infliximab including but not limited to myelosuppression, immunosuppression, autoimmune hepatitis, demyelinating diseases, lymphoma, and serious infections.  The patient understands that monitoring is required including a PPD at baseline and must alert us or the primary physician if symptoms of infection or other concerning signs are noted.
Cyclophosphamide Pregnancy And Lactation Text: This medication is Pregnancy Category D and it isn't considered safe during pregnancy. This medication is excreted in breast milk.
Topical Retinoid counseling:  Patient advised to apply a pea-sized amount only at bedtime and wait 30 minutes after washing their face before applying.  If too drying, patient may add a non-comedogenic moisturizer. The patient verbalized understanding of the proper use and possible adverse effects of retinoids.  All of the patient's questions and concerns were addressed.
Valtrex Pregnancy And Lactation Text: this medication is Pregnancy Category B and is considered safe during pregnancy. This medication is not directly found in breast milk but it's metabolite acyclovir is present.
Acitretin Counseling:  I discussed with the patient the risks of acitretin including but not limited to hair loss, dry lips/skin/eyes, liver damage, hyperlipidemia, depression/suicidal ideation, photosensitivity.  Serious rare side effects can include but are not limited to pancreatitis, pseudotumor cerebri, bony changes, clot formation/stroke/heart attack.  Patient understands that alcohol is contraindicated since it can result in liver toxicity and significantly prolong the elimination of the drug by many years.
Opioid Counseling: I discussed with the patient the potential side effects of opioids including but not limited to addiction, altered mental status, and depression. I stressed avoiding alcohol, benzodiazepines, muscle relaxants and sleep aids unless specifically okayed by a physician. The patient verbalized understanding of the proper use and possible adverse effects of opioids. All of the patient's questions and concerns were addressed. They were instructed to flush the remaining pills down the toilet if they did not need them for pain.
Cephalexin Counseling: I counseled the patient regarding use of cephalexin as an antibiotic for prophylactic and/or therapeutic purposes. Cephalexin (commonly prescribed under brand name Keflex) is a cephalosporin antibiotic which is active against numerous classes of bacteria, including most skin bacteria. Side effects may include nausea, diarrhea, gastrointestinal upset, rash, hives, yeast infections, and in rare cases, hepatitis, kidney disease, seizures, fever, confusion, neurologic symptoms, and others. Patients with severe allergies to penicillin medications are cautioned that there is about a 10% incidence of cross-reactivity with cephalosporins. When possible, patients with penicillin allergies should use alternatives to cephalosporins for antibiotic therapy.
Aklief counseling:  Patient advised to apply a pea-sized amount only at bedtime and wait 30 minutes after washing their face before applying.  If too drying, patient may add a non-comedogenic moisturizer.  The most commonly reported side effects including irritation, redness, scaling, dryness, stinging, burning, itching, and increased risk of sunburn.  The patient verbalized understanding of the proper use and possible adverse effects of retinoids.  All of the patient's questions and concerns were addressed.
Topical Sulfur Applications Pregnancy And Lactation Text: This medication is Pregnancy Category C and has an unknown safety profile during pregnancy. It is unknown if this topical medication is excreted in breast milk.
Nsaids Counseling: NSAID Counseling: I discussed with the patient that NSAIDs should be taken with food. Prolonged use of NSAIDs can result in the development of stomach ulcers.  Patient advised to stop taking NSAIDs if abdominal pain occurs.  The patient verbalized understanding of the proper use and possible adverse effects of NSAIDs.  All of the patient's questions and concerns were addressed.
Include Pregnancy/Lactation Warning?: No
Birth Control Pills Counseling: Birth Control Pill Counseling: I discussed with the patient the potential side effects of OCPs including but not limited to increased risk of stroke, heart attack, thrombophlebitis, deep venous thrombosis, hepatic adenomas, breast changes, GI upset, headaches, and depression.  The patient verbalized understanding of the proper use and possible adverse effects of OCPs. All of the patient's questions and concerns were addressed.
Taltz Counseling: I discussed with the patient the risks of ixekizumab including but not limited to immunosuppression, serious infections, worsening of inflammatory bowel disease and drug reactions.  The patient understands that monitoring is required including a PPD at baseline and must alert us or the primary physician if symptoms of infection or other concerning signs are noted.
Birth Control Pills Pregnancy And Lactation Text: This medication should be avoided if pregnant and for the first 30 days post-partum.
Nsaids Pregnancy And Lactation Text: These medications are considered safe up to 30 weeks gestation. It is excreted in breast milk.
Minocycline Counseling: Patient advised regarding possible photosensitivity and discoloration of the teeth, skin, lips, tongue and gums.  Patient instructed to avoid sunlight, if possible.  When exposed to sunlight, patients should wear protective clothing, sunglasses, and sunscreen.  The patient was instructed to call the office immediately if the following severe adverse effects occur:  hearing changes, easy bruising/bleeding, severe headache, or vision changes.  The patient verbalized understanding of the proper use and possible adverse effects of minocycline.  All of the patient's questions and concerns were addressed.
Carac Counseling:  I discussed with the patient the risks of Carac including but not limited to erythema, scaling, itching, weeping, crusting, and pain.
Terbinafine Counseling: Patient counseling regarding adverse effects of terbinafine including but not limited to headache, diarrhea, rash, upset stomach, liver function test abnormalities, itching, taste/smell disturbance, nausea, abdominal pain, and flatulence.  There is a rare possibility of liver failure that can occur when taking terbinafine.  The patient understands that a baseline LFT and kidney function test may be required. The patient verbalized understanding of the proper use and possible adverse effects of terbinafine.  All of the patient's questions and concerns were addressed.
Olumiant Counseling: I discussed with the patient the risks of Olumiant therapy including but not limited to upper respiratory tract infections, shingles, cold sores, and nausea. Live vaccines should be avoided.  This medication has been linked to serious infections; higher rate of mortality; malignancy and lymphoproliferative disorders; major adverse cardiovascular events; thrombosis; gastrointestinal perforations; neutropenia; lymphopenia; anemia; liver enzyme elevations; and lipid elevations.
Drysol Pregnancy And Lactation Text: This medication is considered safe during pregnancy and breast feeding.
Propranolol Counseling:  I discussed with the patient the risks of propranolol including but not limited to low heart rate, low blood pressure, low blood sugar, restlessness and increased cold sensitivity. They should call the office if they experience any of these side effects.
Albendazole Counseling:  I discussed with the patient the risks of albendazole including but not limited to cytopenia, kidney damage, nausea/vomiting and severe allergy.  The patient understands that this medication is being used in an off-label manner.
Libtayo Counseling- I discussed with the patient the risks of Libtayo including but not limited to nausea, vomiting, diarrhea, and bone or muscle pain.  The patient verbalized understanding of the proper use and possible adverse effects of Libtayo.  All of the patient's questions and concerns were addressed.
Libtayo Pregnancy And Lactation Text: This medication is contraindicated in pregnancy and when breast feeding.
Elidel Counseling: Patient may experience a mild burning sensation during topical application. Elidel is not approved in children less than 2 years of age. There have been case reports of hematologic and skin malignancies in patients using topical calcineurin inhibitors although causality is questionable.
Albendazole Pregnancy And Lactation Text: This medication is Pregnancy Category C and it isn't known if it is safe during pregnancy. It is also excreted in breast milk.
Cyclosporine Counseling:  I discussed with the patient the risks of cyclosporine including but not limited to hypertension, gingival hyperplasia,myelosuppression, immunosuppression, liver damage, kidney damage, neurotoxicity, lymphoma, and serious infections. The patient understands that monitoring is required including baseline blood pressure, CBC, CMP, lipid panel and uric acid, and then 1-2 times monthly CMP and blood pressure.
Protopic Counseling: Patient may experience a mild burning sensation during topical application. Protopic is not approved in children less than 2 years of age. There have been case reports of hematologic and skin malignancies in patients using topical calcineurin inhibitors although causality is questionable.
Propranolol Pregnancy And Lactation Text: This medication is Pregnancy Category C and it isn't known if it is safe during pregnancy. It is excreted in breast milk.
Acitretin Pregnancy And Lactation Text: This medication is Pregnancy Category X and should not be given to women who are pregnant or may become pregnant in the future. This medication is excreted in breast milk.
Aklief Pregnancy And Lactation Text: It is unknown if this medication is safe to use during pregnancy.  It is unknown if this medication is excreted in breast milk.  Breastfeeding women should use the topical cream on the smallest area of the skin for the shortest time needed while breastfeeding.  Do not apply to nipple and areola.
Cephalexin Pregnancy And Lactation Text: This medication is Pregnancy Category B and considered safe during pregnancy.  It is also excreted in breast milk but can be used safely for shorter doses.
Glycopyrrolate Counseling:  I discussed with the patient the risks of glycopyrrolate including but not limited to skin rash, drowsiness, dry mouth, difficulty urinating, and blurred vision.
Opioid Pregnancy And Lactation Text: These medications can lead to premature delivery and should be avoided during pregnancy. These medications are also present in breast milk in small amounts.
Wartpeel Counseling:  I discussed with the patient the risks of Wartpeel including but not limited to erythema, scaling, itching, weeping, crusting, and pain.
Clindamycin Counseling: I counseled the patient regarding use of clindamycin as an antibiotic for prophylactic and/or therapeutic purposes. Clindamycin is active against numerous classes of bacteria, including skin bacteria. Side effects may include nausea, diarrhea, gastrointestinal upset, rash, hives, yeast infections, and in rare cases, colitis.
Fluconazole Counseling:  Patient counseled regarding adverse effects of fluconazole including but not limited to headache, diarrhea, nausea, upset stomach, liver function test abnormalities, taste disturbance, and stomach pain.  There is a rare possibility of liver failure that can occur when taking fluconazole.  The patient understands that monitoring of LFTs and kidney function test may be required, especially at baseline. The patient verbalized understanding of the proper use and possible adverse effects of fluconazole.  All of the patient's questions and concerns were addressed.
Spironolactone Counseling: Patient advised regarding risks of diarrhea, abdominal pain, hyperkalemia, birth defects (for female patients), liver toxicity and renal toxicity. The patient may need blood work to monitor liver and kidney function and potassium levels while on therapy. The patient verbalized understanding of the proper use and possible adverse effects of spironolactone.  All of the patient's questions and concerns were addressed.
Glycopyrrolate Pregnancy And Lactation Text: This medication is Pregnancy Category B and is considered safe during pregnancy. It is unknown if it is excreted breast milk.
Terbinafine Pregnancy And Lactation Text: This medication is Pregnancy Category B and is considered safe during pregnancy. It is also excreted in breast milk and breast feeding isn't recommended.
Olanzapine Counseling- I discussed with the patient the common side effects of olanzapine including but are not limited to: lack of energy, dry mouth, increased appetite, sleepiness, tremor, constipation, dizziness, changes in behavior, or restlessness.  Explained that teenagers are more likely to experience headaches, abdominal pain, pain in the arms or legs, tiredness, and sleepiness.  Serious side effects include but are not limited: increased risk of death in elderly patients who are confused, have memory loss, or dementia-related psychosis; hyperglycemia; increased cholesterol and triglycerides; and weight gain.
Olumiant Pregnancy And Lactation Text: Based on animal studies, Olumiant may cause embryo-fetal harm when administered to pregnant women.  The medication should not be used in pregnancy.  Breastfeeding is not recommended during treatment.
Dupixent Counseling: I discussed with the patient the risks of dupilumab including but not limited to eye infection and irritation, cold sores, injection site reactions, worsening of asthma, allergic reactions and increased risk of parasitic infection.  Live vaccines should be avoided while taking dupilumab. Dupilumab will also interact with certain medications such as warfarin and cyclosporine. The patient understands that monitoring is required and they must alert us or the primary physician if symptoms of infection or other concerning signs are noted.
Klisyri Pregnancy And Lactation Text: It is unknown if this medication can harm a developing fetus or if it is excreted in breast milk.
Dupixent Pregnancy And Lactation Text: This medication likely crosses the placenta but the risk for the fetus is uncertain. This medication is excreted in breast milk.
Minoxidil Counseling: Minoxidil is a topical medication which can increase blood flow where it is applied. It is uncertain how this medication increases hair growth. Side effects are uncommon and include stinging and allergic reactions.
Rituxan Counseling:  I discussed with the patient the risks of Rituxan infusions. Side effects can include infusion reactions, severe drug rashes including mucocutaneous reactions, reactivation of latent hepatitis and other infections and rarely progressive multifocal leukoencephalopathy.  All of the patient's questions and concerns were addressed.
Tazorac Counseling:  Patient advised that medication is irritating and drying.  Patient may need to apply sparingly and wash off after an hour before eventually leaving it on overnight.  The patient verbalized understanding of the proper use and possible adverse effects of tazorac.  All of the patient's questions and concerns were addressed.
SSKI Counseling:  I discussed with the patient the risks of SSKI including but not limited to thyroid abnormalities, metallic taste, GI upset, fever, headache, acne, arthralgias, paraesthesias, lymphadenopathy, easy bleeding, arrhythmias, and allergic reaction.
Ivermectin Counseling:  Patient instructed to take medication on an empty stomach with a full glass of water.  Patient informed of potential adverse effects including but not limited to nausea, diarrhea, dizziness, itching, and swelling of the extremities or lymph nodes.  The patient verbalized understanding of the proper use and possible adverse effects of ivermectin.  All of the patient's questions and concerns were addressed.
Odomzo Counseling- I discussed with the patient the risks of Odomzo including but not limited to nausea, vomiting, diarrhea, constipation, weight loss, changes in the sense of taste, decreased appetite, muscle spasms, and hair loss.  The patient verbalized understanding of the proper use and possible adverse effects of Odomzo.  All of the patient's questions and concerns were addressed.
Protopic Pregnancy And Lactation Text: This medication is Pregnancy Category C. It is unknown if this medication is excreted in breast milk when applied topically.
Bexarotene Counseling:  I discussed with the patient the risks of bexarotene including but not limited to hair loss, dry lips/skin/eyes, liver abnormalities, hyperlipidemia, pancreatitis, depression/suicidal ideation, photosensitivity, drug rash/allergic reactions, hypothyroidism, anemia, leukopenia, infection, cataracts, and teratogenicity.  Patient understands that they will need regular blood tests to check lipid profile, liver function tests, white blood cell count, thyroid function tests and pregnancy test if applicable.
Azelaic Acid Counseling: Patient counseled that medicine may cause skin irritation and to avoid applying near the eyes.  In the event of skin irritation, the patient was advised to reduce the amount of the drug applied or use it less frequently.   The patient verbalized understanding of the proper use and possible adverse effects of azelaic acid.  All of the patient's questions and concerns were addressed.
Cimetidine Counseling:  I discussed with the patient the risks of Cimetidine including but not limited to gynecomastia, headache, diarrhea, nausea, drowsiness, arrhythmias, pancreatitis, skin rashes, psychosis, bone marrow suppression and kidney toxicity.
Tremfya Counseling: I discussed with the patient the risks of guselkumab including but not limited to immunosuppression, serious infections, and drug reactions.  The patient understands that monitoring is required including a PPD at baseline and must alert us or the primary physician if symptoms of infection or other concerning signs are noted.
Clindamycin Pregnancy And Lactation Text: This medication can be used in pregnancy if certain situations. Clindamycin is also present in breast milk.
Bexarotene Pregnancy And Lactation Text: This medication is Pregnancy Category X and should not be given to women who are pregnant or may become pregnant. This medication should not be used if you are breast feeding.
Hydroxychloroquine Counseling:  I discussed with the patient that a baseline ophthalmologic exam is needed at the start of therapy and every year thereafter while on therapy. A CBC may also be warranted for monitoring.  The side effects of this medication were discussed with the patient, including but not limited to agranulocytosis, aplastic anemia, seizures, rashes, retinopathy, and liver toxicity. Patient instructed to call the office should any adverse effect occur.  The patient verbalized understanding of the proper use and possible adverse effects of Plaquenil.  All the patient's questions and concerns were addressed.
Winlevi Counseling:  I discussed with the patient the risks of topical clascoterone including but not limited to erythema, scaling, itching, and stinging. Patient voiced their understanding.
Azithromycin Counseling:  I discussed with the patient the risks of azithromycin including but not limited to GI upset, allergic reaction, drug rash, diarrhea, and yeast infections.
Quinolones Counseling:  I discussed with the patient the risks of fluoroquinolones including but not limited to GI upset, allergic reaction, drug rash, diarrhea, dizziness, photosensitivity, yeast infections, liver function test abnormalities, tendonitis/tendon rupture.
Spironolactone Pregnancy And Lactation Text: This medication can cause feminization of the male fetus and should be avoided during pregnancy. The active metabolite is also found in breast milk.
Olanzapine Pregnancy And Lactation Text: This medication is pregnancy category C.   There are no adequate and well controlled trials with olanzapine in pregnant females.  Olanzapine should be used during pregnancy only if the potential benefit justifies the potential risk to the fetus.   In a study in lactating healthy women, olanzapine was excreted in breast milk.  It is recommended that women taking olanzapine should not breast feed.
Arava Counseling:  Patient counseled regarding adverse effects of Arava including but not limited to nausea, vomiting, abnormalities in liver function tests. Patients may develop mouth sores, rash, diarrhea, and abnormalities in blood counts. The patient understands that monitoring is required including LFTs and blood counts.  There is a rare possibility of scarring of the liver and lung problems that can occur when taking methotrexate. Persistent nausea, loss of appetite, pale stools, dark urine, cough, and shortness of breath should be reported immediately. Patient advised to discontinue Arava treatment and consult with a physician prior to attempting conception. The patient will have to undergo a treatment to eliminate Arava from the body prior to conception.
Rinvoq Counseling: I discussed with the patient the risks of Rinvoq therapy including but not limited to upper respiratory tract infections, shingles, cold sores, bronchitis, nausea, cough, fever, acne, and headache. Live vaccines should be avoided.  This medication has been linked to serious infections; higher rate of mortality; malignancy and lymphoproliferative disorders; major adverse cardiovascular events; thrombosis; thrombocytopenia, anemia, and neutropenia; lipid elevations; liver enzyme elevations; and gastrointestinal perforations.
Rinvoq Pregnancy And Lactation Text: Based on animal studies, Rinvoq may cause embryo-fetal harm when administered to pregnant women.  The medication should not be used in pregnancy.  Breastfeeding is not recommended during treatment and for 6 days after the last dose.
Calcipotriene Pregnancy And Lactation Text: This medication has not been proven safe during pregnancy. It is unknown if this medication is excreted in breast milk.
Eucrisa Counseling: Patient may experience a mild burning sensation during topical application. Eucrisa is not approved in children less than 2 years of age.
Oral Minoxidil Counseling- I discussed with the patient the risks of oral minoxidil including but not limited to shortness of breath, swelling of the feet or ankles, dizziness, lightheadedness, unwanted hair growth and allergic reaction.  The patient verbalized understanding of the proper use and possible adverse effects of oral minoxidil.  All of the patient's questions and concerns were addressed.
Enbrel Counseling:  I discussed with the patient the risks of etanercept including but not limited to myelosuppression, immunosuppression, autoimmune hepatitis, demyelinating diseases, lymphoma, and infections.  The patient understands that monitoring is required including a PPD at baseline and must alert us or the primary physician if symptoms of infection or other concerning signs are noted.
Qbrexza Counseling:  I discussed with the patient the risks of Qbrexza including but not limited to headache, mydriasis, blurred vision, dry eyes, nasal dryness, dry mouth, dry throat, dry skin, urinary hesitation, and constipation.  Local skin reactions including erythema, burning, stinging, and itching can also occur.
Methotrexate Counseling:  Patient counseled regarding adverse effects of methotrexate including but not limited to nausea, vomiting, abnormalities in liver function tests. Patients may develop mouth sores, rash, diarrhea, and abnormalities in blood counts. The patient understands that monitoring is required including LFT's and blood counts.  There is a rare possibility of scarring of the liver and lung problems that can occur when taking methotrexate. Persistent nausea, loss of appetite, pale stools, dark urine, cough, and shortness of breath should be reported immediately. Patient advised to discontinue methotrexate treatment at least three months before attempting to become pregnant.  I discussed the need for folate supplements while taking methotrexate.  These supplements can decrease side effects during methotrexate treatment. The patient verbalized understanding of the proper use and possible adverse effects of methotrexate.  All of the patient's questions and concerns were addressed.
Sski Pregnancy And Lactation Text: This medication is Pregnancy Category D and isn't considered safe during pregnancy. It is excreted in breast milk.
Rituxan Pregnancy And Lactation Text: This medication is Pregnancy Category C and it isn't know if it is safe during pregnancy. It is unknown if this medication is excreted in breast milk but similar antibodies are known to be excreted.
Tazorac Pregnancy And Lactation Text: This medication is not safe during pregnancy. It is unknown if this medication is excreted in breast milk.
Siliq Counseling:  I discussed with the patient the risks of Siliq including but not limited to new or worsening depression, suicidal thoughts and behavior, immunosuppression, malignancy, posterior leukoencephalopathy syndrome, and serious infections.  The patient understands that monitoring is required including a PPD at baseline and must alert us or the primary physician if symptoms of infection or other concerning signs are noted. There is also a special program designed to monitor depression which is required with Siliq.
Methotrexate Pregnancy And Lactation Text: This medication is Pregnancy Category X and is known to cause fetal harm. This medication is excreted in breast milk.
Topical Clindamycin Counseling: Patient counseled that this medication may cause skin irritation or allergic reactions.  In the event of skin irritation, the patient was advised to reduce the amount of the drug applied or use it less frequently.   The patient verbalized understanding of the proper use and possible adverse effects of clindamycin.  All of the patient's questions and concerns were addressed.
Azathioprine Counseling:  I discussed with the patient the risks of azathioprine including but not limited to myelosuppression, immunosuppression, hepatotoxicity, lymphoma, and infections.  The patient understands that monitoring is required including baseline LFTs, Creatinine, possible TPMP genotyping and weekly CBCs for the first month and then every 2 weeks thereafter.  The patient verbalized understanding of the proper use and possible adverse effects of azathioprine.  All of the patient's questions and concerns were addressed.
Doxycycline Counseling:  Patient counseled regarding possible photosensitivity and increased risk for sunburn.  Patient instructed to avoid sunlight, if possible.  When exposed to sunlight, patients should wear protective clothing, sunglasses, and sunscreen.  The patient was instructed to call the office immediately if the following severe adverse effects occur:  hearing changes, easy bruising/bleeding, severe headache, or vision changes.  The patient verbalized understanding of the proper use and possible adverse effects of doxycycline.  All of the patient's questions and concerns were addressed.
Isotretinoin Counseling: Patient should get monthly blood tests, not donate blood, not drive at night if vision affected, not share medication, and not undergo elective surgery for 6 months after tx completed. Side effects reviewed, pt to contact office should one occur.
Griseofulvin Counseling:  I discussed with the patient the risks of griseofulvin including but not limited to photosensitivity, cytopenia, liver damage, nausea/vomiting and severe allergy.  The patient understands that this medication is best absorbed when taken with a fatty meal (e.g., ice cream or french fries).
Hydroxychloroquine Pregnancy And Lactation Text: This medication has been shown to cause fetal harm but it isn't assigned a Pregnancy Risk Category. There are small amounts excreted in breast milk.
Winlevi Pregnancy And Lactation Text: This medication is considered safe during pregnancy and breastfeeding.
Azithromycin Pregnancy And Lactation Text: This medication is considered safe during pregnancy and is also secreted in breast milk.
Simponi Counseling:  I discussed with the patient the risks of golimumab including but not limited to myelosuppression, immunosuppression, autoimmune hepatitis, demyelinating diseases, lymphoma, and serious infections.  The patient understands that monitoring is required including a PPD at baseline and must alert us or the primary physician if symptoms of infection or other concerning signs are noted.

## 2023-04-07 RX ORDER — VALACYCLOVIR HYDROCHLORIDE 1 G/1
TABLET, FILM COATED ORAL
Qty: 90 TABLET | Refills: 3 | Status: SHIPPED | OUTPATIENT
Start: 2023-04-07

## 2023-04-26 ENCOUNTER — TELEPHONE (OUTPATIENT)
Dept: MEDICAL GROUP | Facility: PHYSICIAN GROUP | Age: 63
End: 2023-04-26
Payer: COMMERCIAL

## 2023-04-26 NOTE — TELEPHONE ENCOUNTER
DOCUMENTATION OF PAR STATUS:    1. Name of Medication & Dose: Effexor XR 150MG er capsules     2. Name of Prescription Coverage Company & phone #: [employer] ePA Form 2017 NCPDP    3. Date Prior Auth Submitted: 04/26/23    4. What information was given to obtain insurance decision? ICD Code     5. Prior Auth Status? Yes - Pending    6. Patient Notified: no

## 2023-05-03 ENCOUNTER — TELEPHONE (OUTPATIENT)
Dept: MEDICAL GROUP | Facility: PHYSICIAN GROUP | Age: 63
End: 2023-05-03
Payer: COMMERCIAL

## 2023-05-03 NOTE — TELEPHONE ENCOUNTER
FINAL PRIOR AUTHORIZATION STATUS:    1.  Name of Medication & Dose: Effexor XR 150MG er capsules     2. Prior Auth Status: Denied.  Reason: [employer] ePA Form 2017 NCPDP    3. Action Taken: Pharmacy Notified: yes Patient Notified: yes

## 2023-05-15 ENCOUNTER — APPOINTMENT (OUTPATIENT)
Dept: RADIOLOGY | Facility: MEDICAL CENTER | Age: 63
End: 2023-05-15
Attending: INTERNAL MEDICINE
Payer: COMMERCIAL

## 2023-06-07 ENCOUNTER — APPOINTMENT (RX ONLY)
Dept: URBAN - METROPOLITAN AREA CLINIC 4 | Facility: CLINIC | Age: 63
Setting detail: DERMATOLOGY
End: 2023-06-07

## 2023-06-07 DIAGNOSIS — L82.1 OTHER SEBORRHEIC KERATOSIS: ICD-10-CM

## 2023-06-07 DIAGNOSIS — L81.4 OTHER MELANIN HYPERPIGMENTATION: ICD-10-CM

## 2023-06-07 DIAGNOSIS — L82.0 INFLAMED SEBORRHEIC KERATOSIS: ICD-10-CM

## 2023-06-07 DIAGNOSIS — L73.8 OTHER SPECIFIED FOLLICULAR DISORDERS: ICD-10-CM

## 2023-06-07 DIAGNOSIS — D22 MELANOCYTIC NEVI: ICD-10-CM

## 2023-06-07 DIAGNOSIS — Z71.89 OTHER SPECIFIED COUNSELING: ICD-10-CM

## 2023-06-07 DIAGNOSIS — D18.0 HEMANGIOMA: ICD-10-CM

## 2023-06-07 PROBLEM — D22.5 MELANOCYTIC NEVI OF TRUNK: Status: ACTIVE | Noted: 2023-06-07

## 2023-06-07 PROBLEM — D18.01 HEMANGIOMA OF SKIN AND SUBCUTANEOUS TISSUE: Status: ACTIVE | Noted: 2023-06-07

## 2023-06-07 PROCEDURE — ? COUNSELING

## 2023-06-07 PROCEDURE — 99213 OFFICE O/P EST LOW 20 MIN: CPT

## 2023-06-07 PROCEDURE — ? ADDITIONAL NOTES

## 2023-06-07 ASSESSMENT — LOCATION DETAILED DESCRIPTION DERM
LOCATION DETAILED: LEFT ULNAR DORSAL HAND
LOCATION DETAILED: RIGHT MEDIAL BREAST 4-5:00 REGION
LOCATION DETAILED: PERIUMBILICAL SKIN
LOCATION DETAILED: RIGHT LATERAL ZYGOMA
LOCATION DETAILED: RIGHT RADIAL DORSAL HAND
LOCATION DETAILED: RIGHT SUPERIOR HELIX
LOCATION DETAILED: RIGHT LATERAL MANDIBULAR CHEEK
LOCATION DETAILED: RIGHT MEDIAL SUPERIOR CHEST
LOCATION DETAILED: RIGHT CENTRAL LATERAL NECK
LOCATION DETAILED: LEFT INFERIOR MEDIAL MALAR CHEEK
LOCATION DETAILED: RIGHT FOREHEAD
LOCATION DETAILED: LEFT CENTRAL MALAR CHEEK
LOCATION DETAILED: EPIGASTRIC SKIN
LOCATION DETAILED: LEFT DISTAL PRETIBIAL REGION

## 2023-06-07 ASSESSMENT — LOCATION ZONE DERM
LOCATION ZONE: TRUNK
LOCATION ZONE: NECK
LOCATION ZONE: HAND
LOCATION ZONE: EAR
LOCATION ZONE: LEG
LOCATION ZONE: FACE

## 2023-06-07 ASSESSMENT — LOCATION SIMPLE DESCRIPTION DERM
LOCATION SIMPLE: RIGHT HAND
LOCATION SIMPLE: ABDOMEN
LOCATION SIMPLE: RIGHT FOREHEAD
LOCATION SIMPLE: RIGHT BREAST
LOCATION SIMPLE: LEFT CHEEK
LOCATION SIMPLE: LEFT HAND
LOCATION SIMPLE: RIGHT CHEEK
LOCATION SIMPLE: LEFT PRETIBIAL REGION
LOCATION SIMPLE: NECK
LOCATION SIMPLE: RIGHT ZYGOMA
LOCATION SIMPLE: CHEST
LOCATION SIMPLE: RIGHT EAR

## 2023-06-19 ENCOUNTER — HOSPITAL ENCOUNTER (OUTPATIENT)
Dept: RADIOLOGY | Facility: MEDICAL CENTER | Age: 63
End: 2023-06-19
Attending: INTERNAL MEDICINE
Payer: COMMERCIAL

## 2023-06-19 DIAGNOSIS — M79.671 BILATERAL FOOT PAIN: ICD-10-CM

## 2023-06-19 DIAGNOSIS — M79.642 BILATERAL HAND PAIN: ICD-10-CM

## 2023-06-19 DIAGNOSIS — M79.672 BILATERAL FOOT PAIN: ICD-10-CM

## 2023-06-19 DIAGNOSIS — M79.641 BILATERAL HAND PAIN: ICD-10-CM

## 2023-06-19 PROCEDURE — 77077 JOINT SURVEY SINGLE VIEW: CPT

## 2023-08-08 ENCOUNTER — APPOINTMENT (OUTPATIENT)
Dept: MEDICAL GROUP | Facility: PHYSICIAN GROUP | Age: 63
End: 2023-08-08
Payer: COMMERCIAL

## 2023-08-22 ENCOUNTER — APPOINTMENT (RX ONLY)
Dept: URBAN - METROPOLITAN AREA CLINIC 20 | Facility: CLINIC | Age: 63
Setting detail: DERMATOLOGY
End: 2023-08-22

## 2023-08-22 DIAGNOSIS — L57.0 ACTINIC KERATOSIS: ICD-10-CM

## 2023-08-22 PROCEDURE — ? PDT: RED

## 2023-08-22 PROCEDURE — 96567 PDT DSTR PRMLG LES SKN: CPT

## 2023-08-22 PROCEDURE — ? PHOTO-DOCUMENTATION

## 2023-08-22 PROCEDURE — ? PHOTODYNAMIC THERAPY COUNSELING

## 2023-08-22 ASSESSMENT — LOCATION SIMPLE DESCRIPTION DERM: LOCATION SIMPLE: SUPERIOR FOREHEAD

## 2023-08-22 ASSESSMENT — LOCATION DETAILED DESCRIPTION DERM: LOCATION DETAILED: SUPERIOR MID FOREHEAD

## 2023-08-22 ASSESSMENT — LOCATION ZONE DERM: LOCATION ZONE: FACE

## 2023-08-22 NOTE — PROCEDURE: PDT: RED
Consent: Written consent obtained.  The risks were reviewed with the patient including but not limited to: pigmentary changes, pain, blistering, scabbing, redness, and the remote possibility of scarring.
Post-Care Instructions: I reviewed with the patient in detail post-care instructions. Patient is to avoid sunlight for the next 2 days, and wear sun protection. Patients may expect sunburn like redness, discomfort and scabbing.
Expiration Date (Optional): 7/2024
History Of Hsv?: No
Photosensitizer: Ameluz
Debridement Text (Will Only Render In Visit Note If You Select Debridement Option Under Who Performed The Pdt Field): Prior to application of the photodynamic medication the hyperkeratotic lesions were curetted to make them more amenable to therapy.
Who Performed The Pdt?: Performed by Nurse, MA or  with Pre-Procedure Debridement of Hyperkeratotic Lesions (42733)
Detail Level: Zone
Number Of Kerasticks/Tubes Of Metvixia/Tubes Of Ameluz Used: 1
Lot # (Optional): [id number]
Eye Protection Applied?: Yes
Illumination Time: 7 min 7 sec
Incubation Time (Set To 00:00:00 If Not Wanted): 01:30:00
Application Method: without occlusion
Anesthesia Type: 1% lidocaine with epinephrine
Who Performed The Pdt (Optional): Enma Ramos MA
Ndc# (Optional): [id number]
Total Number Of Aks Treated (Optional To Report): 0

## 2023-09-09 NOTE — PROGRESS NOTES
Virtual Visit: Established Patient   This visit was conducted via Zoom using secure and encrypted videoconferencing technology. The patient was in a private location in the state of [place].    The patient's identity was confirmed and verbal consent was obtained for this virtual visit.    Subjective:   CC:   Chief Complaint   Patient presents with    Medication Management     Wants something to sleep at night     Requesting Labs       Lizy Saleem is a 63 y.o. female presenting for evaluation and management of:    DERREK (generalized anxiety disorder)  The patient requires Effexor (brand name) 150 mg ER daily to effectively control her symptoms.  The patient states she is interested in a medication change as she has difficulty paying for the brand-name medication due to its prohibitive cost as well as difficulty finding the medication in stock.  Review of the patient's records indicates that when the patient was previously switched to generic venlafaxine that her dose was 75 mg when she was on 150 mg of the brand-name medication.  Suggested we try 150 mg of generic venlafaxine before switching medications since it does seem to effectively control her symptoms.    Primary insomnia  The patient reports difficulty sleeping due to stressors of caring for her .  She is interested in taking a medication to help her sleep.      ROS   Denies any recent fevers or chills. No nausea or vomiting. No chest pains or shortness of breath.     No Known Allergies    Current medicines (including changes today)  Current Outpatient Medications   Medication Sig Dispense Refill    venlafaxine (EFFEXOR-XR) 150 MG extended-release capsule Take 1 Capsule by mouth every day. 90 Capsule 3    traZODone (DESYREL) 50 MG Tab Take 1 Tablet by mouth every evening. 30 Tablet 1    valacyclovir (VALTREX) 1 GM Tab TAKE 1 TABLET BY MOUTH EVERY DAY 90 Tablet 3    levothyroxine (SYNTHROID) 75 MCG Tab Take 1 Tablet by mouth every morning on  an empty stomach. 90 Tablet 3    Non Formulary Request C-progesterone 60 mg/gm CR - apply 4 clicks topically daily 90 Each 3    levothyroxine (SYNTHROID) 75 MCG Tab Take 1 Tablet by mouth every day.      levothyroxine (SYNTHROID) 88 MCG Tab Take 1 Tablet by mouth every day.      meperidine (DEMEROL) 50 MG Tab       norethindrone (AYGESTIN) 5 MG tablet       miSOPROStol (CYTOTEC) 200 MCG Tab       XIFAXAN 550 MG Tab tablet TAKE 1 TABLET BY MOUTH THREE TIMES A DAY FOR 14 DAYS TO TREAT IBS-D      promethazine (PHENERGAN) 12.5 MG Suppos       valACYclovir (VALTREX) 500 MG Tab       NON SPECIFIED C-E2/E3 25 mg/mlTake 0.1ml SL daily (Patient not taking: Reported on 9/13/2023) 5 Each 1     No current facility-administered medications for this visit.       Patient Active Problem List    Diagnosis Date Noted    Bacterial intestinal infection, unspecified 09/30/2023    Diarrhea 09/30/2023    Other and unspecified ovarian cyst 09/30/2023    Pain of toe of right foot 02/17/2023    Overweight with body mass index (BMI) 25.0-29.9 06/19/2022    Chronic pain of both knees 06/19/2022    Recurrent HSV (herpes simplex virus) 06/19/2022    Dense breasts 01/21/2022    Hormone replacement therapy (HRT) 01/21/2022    Arthritis 01/12/2021    First degree hemorrhoids 12/02/2020    Chronic diarrhea 11/18/2020    Chronic midline low back pain without sciatica 11/18/2020    Herpes labialis 02/08/2019    Family history of abdominal aortic aneurysm 02/08/2019    Evans's neuroma of left foot 11/08/2018    DERREK (generalized anxiety disorder) 11/01/2018    Acquired hypothyroidism 11/01/2018    Menopausal syndrome (hot flashes) 11/01/2018    Other sleep apnea 11/01/2018    Restless legs 11/01/2018    Family history of malignant neoplasm of breast 11/17/2017       Family History   Problem Relation Age of Onset    Breast Cancer Paternal Aunt     Cancer Father     Hyperlipidemia Father     Hypertension Father     Other Father         multiple aneurisms  "   Thyroid Mother     Cancer Mother         melanoma skin and eye    Lung Disease Mother     Cancer Cousin        She  has a past medical history of Acquired hypothyroidism (11/1/2018), Anesthesia, Anxiety reaction (11/1/2018), Chronic diarrhea (11/18/2020), Chronic midline low back pain without sciatica (11/18/2020), Family history of abdominal aortic aneurysm (2/8/2019), Herpes labialis (2/8/2019), and Evans's neuroma of left foot (11/8/2018).  She  has a past surgical history that includes bunionectomy (Left, 2000); bunionectomy (Right, 2003); arthroscopy, knee (Left, 1992); hysteroscopy with video diagnostic (10/23/2015); and dilation and curettage (10/23/2015).       Objective:   Ht 1.676 m (5' 6\")   Wt 77.1 kg (170 lb)   BMI 27.44 kg/m²     Physical Exam:  Constitutional: Alert, no distress, well-groomed.  Skin: No rashes in visible areas.  Eye: Round. Conjunctiva clear, lids normal. No icterus.   ENMT: Lips pink without lesions, good dentition, moist mucous membranes. Phonation normal.  Neck: No masses, no thyromegaly. Moves freely without pain.  Respiratory: Unlabored respiratory effort, no cough or audible wheeze  Psych: Alert and oriented x3, normal affect and mood.       Assessment and Plan:   The following treatment plan was discussed:     Acquired hypothyroidism  Chronic condition, controlled.  The patient currently takes levothyroxine 75 mcg daily.  She is clinically euthyroid.  Labs from 3/21/2023 showed a normal TSH of 1.17 and a normal free T41.13.  The patient is due for updated labs.  -Continue current regimen of levothyroxine 75 mcg daily pending updated labs  - TSH; Future  - FREE THYROXINE; Future     DERREK (generalized anxiety disorder)  Chronic condition, controlled.  The patient requires Effexor (brand name) 150 mg ER daily to effectively control her symptoms.  The patient states she is interested in a medication change as she has difficulty paying for the brand-name medication due to its " prohibitive cost as well as difficulty finding the medication in stock.  Review of the patient's records indicates that when the patient was previously switched to generic venlafaxine that her dose was 75 mg when she was on 150 mg of the brand-name medication.  Suggested we try 150 mg of generic venlafaxine before switching medications since it does seem to effectively control her symptoms.  -Changed to generic venlafaxine 150 mg ER daily  - venlafaxine (EFFEXOR-XR) 150 MG extended-release capsule; Take 1 Capsule by mouth every day.  Dispense: 90 Capsule; Refill: 3    Primary insomnia  Acute condition.  The patient reports difficulty sleeping due to stressors of caring for her .  She is interested in taking a medication to help her sleep.  -Start trial of trazodone 50 mg nightly  - traZODone (DESYREL) 50 MG Tab; Take 1 Tablet by mouth every evening.  Dispense: 30 Tablet; Refill: 1    Screening for deficiency anemia  - CBC WITH DIFFERENTIAL; Future    Encounter for screening for diabetes mellitus  - Comp Metabolic Panel; Future    Screening for cardiovascular condition  - Lipid Profile; Future      Follow-up: Return in about 3 months (around 12/13/2023) for 3-month f/u visit.    Please note that this dictation was created using voice recognition software. I have made every reasonable attempt to correct obvious errors, but I expect that there are errors of grammar and possibly content that I did not discover before finalizing the note.

## 2023-09-13 ENCOUNTER — TELEMEDICINE (OUTPATIENT)
Dept: MEDICAL GROUP | Facility: PHYSICIAN GROUP | Age: 63
End: 2023-09-13
Payer: COMMERCIAL

## 2023-09-13 VITALS — WEIGHT: 170 LBS | HEIGHT: 66 IN | BODY MASS INDEX: 27.32 KG/M2

## 2023-09-13 DIAGNOSIS — F51.01 PRIMARY INSOMNIA: ICD-10-CM

## 2023-09-13 DIAGNOSIS — Z13.0 SCREENING FOR DEFICIENCY ANEMIA: ICD-10-CM

## 2023-09-13 DIAGNOSIS — F41.1 GAD (GENERALIZED ANXIETY DISORDER): ICD-10-CM

## 2023-09-13 DIAGNOSIS — Z13.1 ENCOUNTER FOR SCREENING FOR DIABETES MELLITUS: ICD-10-CM

## 2023-09-13 DIAGNOSIS — E03.9 ACQUIRED HYPOTHYROIDISM: ICD-10-CM

## 2023-09-13 DIAGNOSIS — Z13.6 SCREENING FOR CARDIOVASCULAR CONDITION: ICD-10-CM

## 2023-09-13 PROCEDURE — 99214 OFFICE O/P EST MOD 30 MIN: CPT | Mod: 95 | Performed by: INTERNAL MEDICINE

## 2023-09-13 RX ORDER — TRAZODONE HYDROCHLORIDE 50 MG/1
50 TABLET ORAL NIGHTLY
Qty: 30 TABLET | Refills: 1 | Status: SHIPPED | OUTPATIENT
Start: 2023-09-13 | End: 2023-10-10

## 2023-09-13 RX ORDER — VENLAFAXINE HYDROCHLORIDE 150 MG/1
150 CAPSULE, EXTENDED RELEASE ORAL DAILY
Qty: 90 CAPSULE | Refills: 3 | Status: SHIPPED | OUTPATIENT
Start: 2023-09-13 | End: 2024-01-08

## 2023-09-13 ASSESSMENT — FIBROSIS 4 INDEX: FIB4 SCORE: 1.26

## 2023-09-15 ENCOUNTER — HOSPITAL ENCOUNTER (OUTPATIENT)
Dept: LAB | Facility: MEDICAL CENTER | Age: 63
End: 2023-09-15
Attending: INTERNAL MEDICINE
Payer: COMMERCIAL

## 2023-09-15 DIAGNOSIS — Z13.0 SCREENING FOR DEFICIENCY ANEMIA: ICD-10-CM

## 2023-09-15 DIAGNOSIS — Z13.1 ENCOUNTER FOR SCREENING FOR DIABETES MELLITUS: ICD-10-CM

## 2023-09-15 DIAGNOSIS — Z13.6 SCREENING FOR CARDIOVASCULAR CONDITION: ICD-10-CM

## 2023-09-15 DIAGNOSIS — E03.9 ACQUIRED HYPOTHYROIDISM: ICD-10-CM

## 2023-09-15 LAB
ALBUMIN SERPL BCP-MCNC: 4.2 G/DL (ref 3.2–4.9)
ALBUMIN/GLOB SERPL: 1.6 G/DL
ALP SERPL-CCNC: 67 U/L (ref 30–99)
ALT SERPL-CCNC: 17 U/L (ref 2–50)
ANION GAP SERPL CALC-SCNC: 11 MMOL/L (ref 7–16)
AST SERPL-CCNC: 20 U/L (ref 12–45)
BASOPHILS # BLD AUTO: 0.5 % (ref 0–1.8)
BASOPHILS # BLD: 0.03 K/UL (ref 0–0.12)
BILIRUB SERPL-MCNC: 0.3 MG/DL (ref 0.1–1.5)
BUN SERPL-MCNC: 19 MG/DL (ref 8–22)
CALCIUM ALBUM COR SERPL-MCNC: 9.1 MG/DL (ref 8.5–10.5)
CALCIUM SERPL-MCNC: 9.3 MG/DL (ref 8.5–10.5)
CHLORIDE SERPL-SCNC: 103 MMOL/L (ref 96–112)
CHOLEST SERPL-MCNC: 153 MG/DL (ref 100–199)
CO2 SERPL-SCNC: 24 MMOL/L (ref 20–33)
CREAT SERPL-MCNC: 0.76 MG/DL (ref 0.5–1.4)
EOSINOPHIL # BLD AUTO: 0.12 K/UL (ref 0–0.51)
EOSINOPHIL NFR BLD: 2.1 % (ref 0–6.9)
ERYTHROCYTE [DISTWIDTH] IN BLOOD BY AUTOMATED COUNT: 49.5 FL (ref 35.9–50)
GFR SERPLBLD CREATININE-BSD FMLA CKD-EPI: 88 ML/MIN/1.73 M 2
GLOBULIN SER CALC-MCNC: 2.6 G/DL (ref 1.9–3.5)
GLUCOSE SERPL-MCNC: 94 MG/DL (ref 65–99)
HCT VFR BLD AUTO: 41.5 % (ref 37–47)
HDLC SERPL-MCNC: 75 MG/DL
HGB BLD-MCNC: 13.3 G/DL (ref 12–16)
IMM GRANULOCYTES # BLD AUTO: 0.01 K/UL (ref 0–0.11)
IMM GRANULOCYTES NFR BLD AUTO: 0.2 % (ref 0–0.9)
LDLC SERPL CALC-MCNC: 70 MG/DL
LYMPHOCYTES # BLD AUTO: 1.32 K/UL (ref 1–4.8)
LYMPHOCYTES NFR BLD: 22.6 % (ref 22–41)
MCH RBC QN AUTO: 29.9 PG (ref 27–33)
MCHC RBC AUTO-ENTMCNC: 32 G/DL (ref 32.2–35.5)
MCV RBC AUTO: 93.3 FL (ref 81.4–97.8)
MONOCYTES # BLD AUTO: 0.34 K/UL (ref 0–0.85)
MONOCYTES NFR BLD AUTO: 5.8 % (ref 0–13.4)
NEUTROPHILS # BLD AUTO: 4.03 K/UL (ref 1.82–7.42)
NEUTROPHILS NFR BLD: 68.8 % (ref 44–72)
NRBC # BLD AUTO: 0 K/UL
NRBC BLD-RTO: 0 /100 WBC (ref 0–0.2)
PLATELET # BLD AUTO: 194 K/UL (ref 164–446)
PMV BLD AUTO: 14.2 FL (ref 9–12.9)
POTASSIUM SERPL-SCNC: 4.4 MMOL/L (ref 3.6–5.5)
PROT SERPL-MCNC: 6.8 G/DL (ref 6–8.2)
RBC # BLD AUTO: 4.45 M/UL (ref 4.2–5.4)
SODIUM SERPL-SCNC: 138 MMOL/L (ref 135–145)
T4 FREE SERPL-MCNC: 1.18 NG/DL (ref 0.93–1.7)
TRIGL SERPL-MCNC: 40 MG/DL (ref 0–149)
TSH SERPL DL<=0.005 MIU/L-ACNC: 2.21 UIU/ML (ref 0.38–5.33)
WBC # BLD AUTO: 5.9 K/UL (ref 4.8–10.8)

## 2023-09-15 PROCEDURE — 84443 ASSAY THYROID STIM HORMONE: CPT

## 2023-09-15 PROCEDURE — 36415 COLL VENOUS BLD VENIPUNCTURE: CPT

## 2023-09-15 PROCEDURE — 84439 ASSAY OF FREE THYROXINE: CPT

## 2023-09-15 PROCEDURE — 85025 COMPLETE CBC W/AUTO DIFF WBC: CPT

## 2023-09-15 PROCEDURE — 80061 LIPID PANEL: CPT

## 2023-09-15 PROCEDURE — 80053 COMPREHEN METABOLIC PANEL: CPT

## 2023-09-30 PROBLEM — N83.209 OTHER AND UNSPECIFIED OVARIAN CYST: Status: ACTIVE | Noted: 2023-09-30

## 2023-09-30 PROBLEM — K64.0 FIRST DEGREE HEMORRHOIDS: Status: ACTIVE | Noted: 2020-12-02

## 2023-09-30 PROBLEM — A04.9 BACTERIAL INTESTINAL INFECTION, UNSPECIFIED: Status: ACTIVE | Noted: 2023-09-30

## 2023-09-30 PROBLEM — R19.7 DIARRHEA: Status: ACTIVE | Noted: 2023-09-30

## 2023-09-30 RX ORDER — RIFAXIMIN 550 MG/1
TABLET ORAL
COMMUNITY
Start: 2023-07-20 | End: 2023-10-14

## 2023-09-30 RX ORDER — MEPERIDINE HYDROCHLORIDE 50 MG/1
TABLET ORAL
COMMUNITY
End: 2023-10-14

## 2023-09-30 RX ORDER — LEVOTHYROXINE SODIUM 88 UG/1
1 TABLET ORAL DAILY
COMMUNITY
End: 2023-12-18

## 2023-09-30 RX ORDER — LEVOTHYROXINE SODIUM 0.07 MG/1
1 TABLET ORAL
COMMUNITY
End: 2024-01-30

## 2023-09-30 RX ORDER — PROMETHAZINE HYDROCHLORIDE 12.5 MG/1
SUPPOSITORY RECTAL
COMMUNITY
End: 2023-10-14

## 2023-09-30 RX ORDER — MISOPROSTOL 200 UG/1
TABLET ORAL
COMMUNITY
End: 2023-10-14

## 2023-09-30 RX ORDER — VALACYCLOVIR HYDROCHLORIDE 500 MG/1
TABLET, FILM COATED ORAL
COMMUNITY
End: 2023-12-18

## 2023-10-07 ENCOUNTER — OFFICE VISIT (OUTPATIENT)
Dept: URGENT CARE | Facility: PHYSICIAN GROUP | Age: 63
End: 2023-10-07
Payer: COMMERCIAL

## 2023-10-07 ENCOUNTER — APPOINTMENT (OUTPATIENT)
Dept: URGENT CARE | Facility: PHYSICIAN GROUP | Age: 63
End: 2023-10-07
Payer: COMMERCIAL

## 2023-10-07 VITALS
SYSTOLIC BLOOD PRESSURE: 116 MMHG | HEART RATE: 97 BPM | DIASTOLIC BLOOD PRESSURE: 68 MMHG | BODY MASS INDEX: 25.71 KG/M2 | HEIGHT: 66 IN | OXYGEN SATURATION: 99 % | TEMPERATURE: 97 F | WEIGHT: 160 LBS | RESPIRATION RATE: 16 BRPM

## 2023-10-07 DIAGNOSIS — U07.1 COVID: ICD-10-CM

## 2023-10-07 PROCEDURE — 3078F DIAST BP <80 MM HG: CPT | Performed by: FAMILY MEDICINE

## 2023-10-07 PROCEDURE — 3074F SYST BP LT 130 MM HG: CPT | Performed by: FAMILY MEDICINE

## 2023-10-07 PROCEDURE — 99213 OFFICE O/P EST LOW 20 MIN: CPT | Performed by: FAMILY MEDICINE

## 2023-10-07 ASSESSMENT — ENCOUNTER SYMPTOMS
CARDIOVASCULAR NEGATIVE: 1
GASTROINTESTINAL NEGATIVE: 1
EYES NEGATIVE: 1
COUGH: 1
FATIGUE: 1

## 2023-10-07 ASSESSMENT — FIBROSIS 4 INDEX: FIB4 SCORE: 1.55

## 2023-10-07 NOTE — PROGRESS NOTES
"Subjective:   Lizy Saleem is a 63 y.o. female who presents for Coronavirus Screening (Cov + today/) and Fatigue (No appetite, stuffy)    Tested pos at home  Fatigue  Associated symptoms include congestion, coughing and fatigue.       Review of Systems   Constitutional:  Positive for fatigue and malaise/fatigue.   HENT:  Positive for congestion.    Eyes: Negative.    Respiratory:  Positive for cough.    Cardiovascular: Negative.    Gastrointestinal: Negative.    Genitourinary: Negative.        Medications, Allergies, and current problem list reviewed today in Epic.     Objective:     /68   Pulse 97   Temp 36.1 °C (97 °F) (Temporal)   Resp 16   Ht 1.676 m (5' 6\")   Wt 72.6 kg (160 lb)   SpO2 99%     Physical Exam  Vitals and nursing note reviewed.   Constitutional:       Appearance: Normal appearance.   HENT:      Head: Normocephalic and atraumatic.      Right Ear: Tympanic membrane normal.      Left Ear: Tympanic membrane normal.      Nose: Congestion present.      Mouth/Throat:      Pharynx: Oropharynx is clear.   Cardiovascular:      Rate and Rhythm: Normal rate and regular rhythm.   Pulmonary:      Effort: Pulmonary effort is normal.      Breath sounds: Normal breath sounds.   Abdominal:      General: Abdomen is flat. Bowel sounds are normal.      Palpations: Abdomen is soft.   Neurological:      Mental Status: She is alert.         Assessment/Plan:     Diagnosis and associated orders:     1. COVID           Comments/MDM:     symptomatic         Differential diagnosis, natural history, supportive care, and indications for immediate follow-up discussed.    Advised the patient to follow-up with the primary care physician for recheck, reevaluation, and consideration of further management.    Please note that this dictation was created using voice recognition software. I have made a reasonable attempt to correct obvious errors, but I expect that there are errors of grammar and possibly content " that I did not discover before finalizing the note.    This note was electronically signed by Gregory Slater M.D.

## 2023-10-14 ENCOUNTER — OFFICE VISIT (OUTPATIENT)
Dept: URGENT CARE | Facility: CLINIC | Age: 63
End: 2023-10-14
Payer: COMMERCIAL

## 2023-10-14 VITALS
TEMPERATURE: 98.2 F | HEIGHT: 66 IN | RESPIRATION RATE: 16 BRPM | DIASTOLIC BLOOD PRESSURE: 74 MMHG | OXYGEN SATURATION: 97 % | HEART RATE: 76 BPM | BODY MASS INDEX: 28.61 KG/M2 | SYSTOLIC BLOOD PRESSURE: 134 MMHG | WEIGHT: 178 LBS

## 2023-10-14 DIAGNOSIS — U07.1 COVID: ICD-10-CM

## 2023-10-14 PROCEDURE — 99213 OFFICE O/P EST LOW 20 MIN: CPT

## 2023-10-14 PROCEDURE — 3075F SYST BP GE 130 - 139MM HG: CPT

## 2023-10-14 PROCEDURE — 3078F DIAST BP <80 MM HG: CPT

## 2023-10-14 RX ORDER — BENZONATATE 100 MG/1
100 CAPSULE ORAL 3 TIMES DAILY PRN
Qty: 60 CAPSULE | Refills: 0 | Status: SHIPPED | OUTPATIENT
Start: 2023-10-14 | End: 2023-12-18

## 2023-10-14 ASSESSMENT — ENCOUNTER SYMPTOMS
SHORTNESS OF BREATH: 0
SPUTUM PRODUCTION: 1
COUGH: 1
SORE THROAT: 0
FEVER: 0

## 2023-10-14 ASSESSMENT — FIBROSIS 4 INDEX: FIB4 SCORE: 1.55

## 2023-10-14 NOTE — PROGRESS NOTES
Subjective:     CHIEF COMPLAINT  Chief Complaint   Patient presents with    Coronavirus Screening     PT has a cough, yellow mucus x 10 days       HPI  Lizy Saleem is a very pleasant 63 y.o. female who presents with congestion, a productive cough, and difficulty sleeping.  She has been sick with COVID for approximately 10 days.  She was seen in the urgent care on 10/7/2023.  She reports that her  now has developed COVID-pneumonia due to his immunocompromise state and she wanted to be evaluated as well.  She has not had any recent fevers, but she has had a productive cough.  She has had difficulty sleeping secondary to nasal congestion.  She is still experiencing a scratchy throat and headache.    REVIEW OF SYSTEMS  Review of Systems   Constitutional:  Positive for malaise/fatigue. Negative for fever.   HENT:  Positive for congestion. Negative for ear pain and sore throat.    Respiratory:  Positive for cough and sputum production. Negative for shortness of breath.    Cardiovascular:  Negative for chest pain.       PAST MEDICAL HISTORY  Patient Active Problem List    Diagnosis Date Noted    Bacterial intestinal infection, unspecified 09/30/2023    Diarrhea 09/30/2023    Other and unspecified ovarian cyst 09/30/2023    Pain of toe of right foot 02/17/2023    Overweight with body mass index (BMI) 25.0-29.9 06/19/2022    Chronic pain of both knees 06/19/2022    Recurrent HSV (herpes simplex virus) 06/19/2022    Dense breasts 01/21/2022    Hormone replacement therapy (HRT) 01/21/2022    Arthritis 01/12/2021    First degree hemorrhoids 12/02/2020    Chronic diarrhea 11/18/2020    Chronic midline low back pain without sciatica 11/18/2020    Herpes labialis 02/08/2019    Family history of abdominal aortic aneurysm 02/08/2019    Evans's neuroma of left foot 11/08/2018    DERREK (generalized anxiety disorder) 11/01/2018    Acquired hypothyroidism 11/01/2018    Menopausal syndrome (hot flashes) 11/01/2018     Other sleep apnea 11/01/2018    Restless legs 11/01/2018    Family history of malignant neoplasm of breast 11/17/2017       SURGICAL HISTORY   has a past surgical history that includes bunionectomy (Left, 2000); bunionectomy (Right, 2003); arthroscopy, knee (Left, 1992); hysteroscopy with video diagnostic (10/23/2015); and dilation and curettage (10/23/2015).    ALLERGIES  No Known Allergies    CURRENT MEDICATIONS  Home Medications       Reviewed by Rola Pope P.A.-C. (Physician Assistant) on 10/14/23 at 1502  Med List Status: <None>     Medication Last Dose Status   levothyroxine (SYNTHROID) 75 MCG Tab Taking Active   levothyroxine (SYNTHROID) 75 MCG Tab  Active   levothyroxine (SYNTHROID) 88 MCG Tab  Active   meperidine (DEMEROL) 50 MG Tab  Active   miSOPROStol (CYTOTEC) 200 MCG Tab  Active   Non Formulary Request Taking Active   NON SPECIFIED Taking Active   norethindrone (AYGESTIN) 5 MG tablet  Active   promethazine (PHENERGAN) 12.5 MG Suppos  Active   traZODone (DESYREL) 50 MG Tab  Active   valacyclovir (VALTREX) 1 GM Tab Taking Active   valACYclovir (VALTREX) 500 MG Tab  Active   venlafaxine (EFFEXOR-XR) 150 MG extended-release capsule Taking Active   XIFAXAN 550 MG Tab tablet  Active                    SOCIAL HISTORY  Social History     Tobacco Use    Smoking status: Never    Smokeless tobacco: Never   Vaping Use    Vaping Use: Never used   Substance and Sexual Activity    Alcohol use: Not Currently     Comment: 1xmonth    Drug use: No    Sexual activity: Not Currently     Partners: Male     Comment: , retired from [employer]       FAMILY HISTORY  Family History   Problem Relation Age of Onset    Breast Cancer Paternal Aunt     Cancer Father     Hyperlipidemia Father     Hypertension Father     Other Father         multiple aneurisms    Thyroid Mother     Cancer Mother         melanoma skin and eye    Lung Disease Mother     Cancer Cousin           Objective:     VITAL SIGNS: BP  "134/74 (BP Location: Right arm, Patient Position: Sitting, BP Cuff Size: Adult)   Pulse 76   Temp 36.8 °C (98.2 °F) (Temporal)   Resp 16   Ht 1.676 m (5' 6\")   Wt 80.7 kg (178 lb)   SpO2 97%   BMI 28.73 kg/m²     PHYSICAL EXAM  Physical Exam  Vitals reviewed.   Constitutional:       General: She is not in acute distress.     Appearance: Normal appearance. She is normal weight. She is not ill-appearing or toxic-appearing.   HENT:      Head: Normocephalic and atraumatic.      Right Ear: External ear normal.      Left Ear: External ear normal.      Nose: Congestion present.      Mouth/Throat:      Mouth: Mucous membranes are moist.      Pharynx: Posterior oropharyngeal erythema present. No oropharyngeal exudate.      Comments: Uvula midline  Eyes:      Conjunctiva/sclera: Conjunctivae normal.   Cardiovascular:      Rate and Rhythm: Normal rate and regular rhythm.      Heart sounds: Normal heart sounds.   Pulmonary:      Effort: Pulmonary effort is normal. No respiratory distress.      Breath sounds: No stridor. No wheezing, rhonchi or rales.   Musculoskeletal:         General: Normal range of motion.      Cervical back: Normal range of motion.   Skin:     General: Skin is warm and dry.      Coloration: Skin is not pale.   Neurological:      Mental Status: She is alert and oriented to person, place, and time.   Psychiatric:         Mood and Affect: Mood normal.         Assessment/Plan:     1. COVID  - benzonatate (TESSALON) 100 MG Cap; Take 1 Capsule by mouth 3 times a day as needed for Cough.  Dispense: 60 Capsule; Refill: 0  -Tylenol/ibuprofen OTC as needed for discomfort  -Rest and hydrate  -Return to clinic if symptoms worsen, if fevers return, or if symptoms fail to resolve    MDM/Comments:  Patient has stable vital signs and is non-toxic appearing. Discussed supportive care with hydration, rest, Tylenol/Ibuprofen as needed.  Discussed that COVID is a virus and typically does not require antibiotic therapy " unless pneumonia occurs.  Patient has a pulse ox reading of 97% during her visit today and her lungs were clear to auscultation bilaterally.  Patient provided cough medication to take during the day/night.  Discussed signs and symptoms of pneumonia.  Patient demonstrated understanding of treatment plan at this time and will RTC if symptoms worsen or fail to resolve.     Differential diagnosis, natural history, supportive care, and indications for immediate follow-up discussed. All questions answered. Patient agrees with the plan of care.    Follow-up as needed if symptoms worsen or fail to improve to PCP, Urgent care or Emergency Room.    I have personally reviewed prior external notes and test results pertinent to today's visit.  I have independently reviewed and interpreted all diagnostics ordered during this urgent care acute visit.   Discussed management options (risks,benefits, and alternatives to treatment). Pt expresses understanding and the treatment plan was agreed upon. Questions were encouraged and answered to pt's satisfaction.    Please note that this dictation was created using voice recognition software. I have made a reasonable attempt to correct obvious errors, but I expect that there are errors of grammar and possibly content that I did not discover before finalizing the note.

## 2023-12-18 ENCOUNTER — HOSPITAL ENCOUNTER (OUTPATIENT)
Facility: MEDICAL CENTER | Age: 63
End: 2023-12-18
Attending: NURSE PRACTITIONER
Payer: COMMERCIAL

## 2023-12-18 ENCOUNTER — OFFICE VISIT (OUTPATIENT)
Dept: URGENT CARE | Facility: PHYSICIAN GROUP | Age: 63
End: 2023-12-18
Payer: COMMERCIAL

## 2023-12-18 VITALS
WEIGHT: 158 LBS | DIASTOLIC BLOOD PRESSURE: 80 MMHG | BODY MASS INDEX: 25.39 KG/M2 | RESPIRATION RATE: 16 BRPM | OXYGEN SATURATION: 98 % | HEART RATE: 66 BPM | TEMPERATURE: 97.9 F | HEIGHT: 66 IN | SYSTOLIC BLOOD PRESSURE: 132 MMHG

## 2023-12-18 DIAGNOSIS — K52.9 CHRONIC DIARRHEA OF UNKNOWN ORIGIN: ICD-10-CM

## 2023-12-18 DIAGNOSIS — L03.031 PARONYCHIA OF GREAT TOE OF RIGHT FOOT: ICD-10-CM

## 2023-12-18 LAB
C DIFF DNA SPEC QL NAA+PROBE: NEGATIVE
C DIFF TOX GENS STL QL NAA+PROBE: NEGATIVE

## 2023-12-18 PROCEDURE — 87046 STOOL CULTR AEROBIC BACT EA: CPT

## 2023-12-18 PROCEDURE — 99214 OFFICE O/P EST MOD 30 MIN: CPT | Performed by: NURSE PRACTITIONER

## 2023-12-18 PROCEDURE — 87899 AGENT NOS ASSAY W/OPTIC: CPT

## 2023-12-18 PROCEDURE — 87045 FECES CULTURE AEROBIC BACT: CPT

## 2023-12-18 PROCEDURE — 87177 OVA AND PARASITES SMEARS: CPT

## 2023-12-18 PROCEDURE — 87493 C DIFF AMPLIFIED PROBE: CPT

## 2023-12-18 PROCEDURE — 3079F DIAST BP 80-89 MM HG: CPT | Performed by: NURSE PRACTITIONER

## 2023-12-18 PROCEDURE — 87209 SMEAR COMPLEX STAIN: CPT

## 2023-12-18 PROCEDURE — 3075F SYST BP GE 130 - 139MM HG: CPT | Performed by: NURSE PRACTITIONER

## 2023-12-18 RX ORDER — SULFAMETHOXAZOLE AND TRIMETHOPRIM 800; 160 MG/1; MG/1
1 TABLET ORAL 2 TIMES DAILY
Qty: 14 TABLET | Refills: 0 | Status: SHIPPED | OUTPATIENT
Start: 2023-12-18 | End: 2023-12-25

## 2023-12-18 ASSESSMENT — FIBROSIS 4 INDEX: FIB4 SCORE: 1.55

## 2023-12-18 ASSESSMENT — ENCOUNTER SYMPTOMS
ABDOMINAL PAIN: 1
FEVER: 0
HEADACHES: 0
NAUSEA: 0
CHILLS: 0
MYALGIAS: 0
VOMITING: 0
BLOOD IN STOOL: 0
DIARRHEA: 1

## 2023-12-18 NOTE — PROGRESS NOTES
Subjective     Lizy Saleem is a 63 y.o. female who presents with Toe Pain (Right big toe pain, redness x 1 wk after pedicure ) and Diarrhea (Since April wants to check for C-diff )            HPI  New problem.  Patient is a very pleasant 63-year-old female who presents with 2 issues today.  Her first issue is her right great toe pain, redness, and swelling following a pedicure approximately 1 week ago.  She has not noticed any discharge.  She has had no fever, chills, myalgias.  She has been using the mupirocin ointment with no relief of her symptoms.  Additionally she reports several year history of chronic diarrhea that has been ongoing.  She has been to see GI with no cause identified.  She would like to retest in his stool culture and C. difficile to see if there is anything that needs to be treated.  She reports initially that she was having watery diarrhea just once a day however this is escalated over the past several days to 5-6 times per day of watery diarrhea.  She denies any blood, or pus.  She has had no recent use of antibiotics or travel outside the country.  She has been under stress as her  recently passed away.  She states she has had some weight loss but attributes this to stress as well.  She denies any nausea, vomiting.  She does endorse a low appetite.    Patient has no known allergies.  Current Outpatient Medications on File Prior to Visit   Medication Sig Dispense Refill    levothyroxine (SYNTHROID) 75 MCG Tab Take 1 Tablet by mouth every day.      venlafaxine (EFFEXOR-XR) 150 MG extended-release capsule Take 1 Capsule by mouth every day. 90 Capsule 3    valacyclovir (VALTREX) 1 GM Tab TAKE 1 TABLET BY MOUTH EVERY DAY 90 Tablet 3    NON SPECIFIED C-E2/E3 25 mg/mlTake 0.1ml SL daily 5 Each 1    Non Formulary Request C-progesterone 60 mg/gm CR - apply 4 clicks topically daily 90 Each 3     No current facility-administered medications on file prior to visit.     Social History  "    Socioeconomic History    Marital status:      Spouse name: Not on file    Number of children: Not on file    Years of education: Not on file    Highest education level: Not on file   Occupational History    Not on file   Tobacco Use    Smoking status: Never    Smokeless tobacco: Never   Vaping Use    Vaping Use: Never used   Substance and Sexual Activity    Alcohol use: Not Currently     Comment: 1xmonth    Drug use: No    Sexual activity: Not Currently     Partners: Male     Comment: , retired from [employer]   Other Topics Concern    Not on file   Social History Narrative    Not on file     Social Determinants of Health     Financial Resource Strain: Not on file   Food Insecurity: Not on file   Transportation Needs: Not on file   Physical Activity: Not on file   Stress: Not on file   Social Connections: Not on file   Intimate Partner Violence: Not on file   Housing Stability: Not on file     Breast Cancer-related family history is not on file.      Review of Systems   Constitutional:  Positive for malaise/fatigue. Negative for chills and fever.   Gastrointestinal:  Positive for abdominal pain and diarrhea. Negative for blood in stool, nausea and vomiting.   Genitourinary: Negative.    Musculoskeletal:  Negative for myalgias.   Skin:         Erythema/swelling to right great toe   Neurological:  Negative for headaches.   All other systems reviewed and are negative.             Objective     /80 (BP Location: Right arm, Patient Position: Sitting, BP Cuff Size: Adult)   Pulse 66   Temp 36.6 °C (97.9 °F) (Temporal)   Resp 16   Ht 1.676 m (5' 6\")   Wt 71.7 kg (158 lb)   SpO2 98%   BMI 25.50 kg/m²      Physical Exam  Constitutional:       Appearance: Normal appearance. She is not ill-appearing.   Cardiovascular:      Rate and Rhythm: Normal rate and regular rhythm.      Heart sounds: No murmur heard.  Pulmonary:      Effort: Pulmonary effort is normal.      Breath sounds: Normal " breath sounds.   Abdominal:      General: Abdomen is flat. Bowel sounds are increased.      Palpations: Abdomen is soft.      Tenderness: There is no abdominal tenderness.   Neurological:      Mental Status: She is alert.                             Assessment & Plan        1. Chronic diarrhea of unknown origin  CULTURE STOOL    C Diff by PCR rflx Toxin    Complete O&P      2. Paronychia of great toe of right foot  sulfamethoxazole-trimethoprim (BACTRIM DS) 800-160 MG tablet        Bactrim for her toe. Advised warm soaks and may continue mupirocin twice daily.  Stool culture and cdiff. Continue probiotic.  Fluids.  Differential diagnosis, natural history, supportive care, and indications for immediate follow-up were discussed.

## 2023-12-19 LAB
E COLI SXT1+2 STL IA: NORMAL
SIGNIFICANT IND 70042: NORMAL
SITE SITE: NORMAL
SOURCE SOURCE: NORMAL

## 2023-12-21 LAB
BACTERIA STL CULT: NORMAL
E COLI SXT1+2 STL IA: NORMAL
SIGNIFICANT IND 70042: NORMAL
SITE SITE: NORMAL
SOURCE SOURCE: NORMAL

## 2023-12-22 ENCOUNTER — HOSPITAL ENCOUNTER (OUTPATIENT)
Dept: LAB | Facility: MEDICAL CENTER | Age: 63
End: 2023-12-22
Attending: INTERNAL MEDICINE
Payer: COMMERCIAL

## 2023-12-22 LAB
ALBUMIN SERPL BCP-MCNC: 4.2 G/DL (ref 3.2–4.9)
ALBUMIN/GLOB SERPL: 1.8 G/DL
ALP SERPL-CCNC: 62 U/L (ref 30–99)
ALT SERPL-CCNC: 26 U/L (ref 2–50)
ANION GAP SERPL CALC-SCNC: 13 MMOL/L (ref 7–16)
AST SERPL-CCNC: 25 U/L (ref 12–45)
BASOPHILS # BLD AUTO: 0.6 % (ref 0–1.8)
BASOPHILS # BLD: 0.04 K/UL (ref 0–0.12)
BILIRUB SERPL-MCNC: 0.3 MG/DL (ref 0.1–1.5)
BUN SERPL-MCNC: 8 MG/DL (ref 8–22)
CALCIUM ALBUM COR SERPL-MCNC: 8.9 MG/DL (ref 8.5–10.5)
CALCIUM SERPL-MCNC: 9.1 MG/DL (ref 8.5–10.5)
CHLORIDE SERPL-SCNC: 100 MMOL/L (ref 96–112)
CO2 SERPL-SCNC: 22 MMOL/L (ref 20–33)
CREAT SERPL-MCNC: 0.94 MG/DL (ref 0.5–1.4)
EOSINOPHIL # BLD AUTO: 0.12 K/UL (ref 0–0.51)
EOSINOPHIL NFR BLD: 1.7 % (ref 0–6.9)
ERYTHROCYTE [DISTWIDTH] IN BLOOD BY AUTOMATED COUNT: 50.7 FL (ref 35.9–50)
GFR SERPLBLD CREATININE-BSD FMLA CKD-EPI: 68 ML/MIN/1.73 M 2
GLOBULIN SER CALC-MCNC: 2.4 G/DL (ref 1.9–3.5)
GLUCOSE SERPL-MCNC: 85 MG/DL (ref 65–99)
HCT VFR BLD AUTO: 39.4 % (ref 37–47)
HGB BLD-MCNC: 12.8 G/DL (ref 12–16)
IMM GRANULOCYTES # BLD AUTO: 0.02 K/UL (ref 0–0.11)
IMM GRANULOCYTES NFR BLD AUTO: 0.3 % (ref 0–0.9)
LYMPHOCYTES # BLD AUTO: 1.74 K/UL (ref 1–4.8)
LYMPHOCYTES NFR BLD: 24.4 % (ref 22–41)
MCH RBC QN AUTO: 28.6 PG (ref 27–33)
MCHC RBC AUTO-ENTMCNC: 32.5 G/DL (ref 32.2–35.5)
MCV RBC AUTO: 88.1 FL (ref 81.4–97.8)
MONOCYTES # BLD AUTO: 0.44 K/UL (ref 0–0.85)
MONOCYTES NFR BLD AUTO: 6.2 % (ref 0–13.4)
NEUTROPHILS # BLD AUTO: 4.76 K/UL (ref 1.82–7.42)
NEUTROPHILS NFR BLD: 66.8 % (ref 44–72)
NRBC # BLD AUTO: 0 K/UL
NRBC BLD-RTO: 0 /100 WBC (ref 0–0.2)
PLATELET # BLD AUTO: 224 K/UL (ref 164–446)
PMV BLD AUTO: 13.3 FL (ref 9–12.9)
POTASSIUM SERPL-SCNC: 4.1 MMOL/L (ref 3.6–5.5)
PROT SERPL-MCNC: 6.6 G/DL (ref 6–8.2)
RBC # BLD AUTO: 4.47 M/UL (ref 4.2–5.4)
SODIUM SERPL-SCNC: 135 MMOL/L (ref 135–145)
T3FREE SERPL-MCNC: 2.55 PG/ML (ref 2–4.4)
T4 SERPL-MCNC: 8.4 UG/DL (ref 4–12)
TSH SERPL DL<=0.005 MIU/L-ACNC: 0.76 UIU/ML (ref 0.38–5.33)
WBC # BLD AUTO: 7.1 K/UL (ref 4.8–10.8)

## 2023-12-22 PROCEDURE — 80053 COMPREHEN METABOLIC PANEL: CPT

## 2023-12-22 PROCEDURE — 36415 COLL VENOUS BLD VENIPUNCTURE: CPT

## 2023-12-22 PROCEDURE — 84443 ASSAY THYROID STIM HORMONE: CPT

## 2023-12-22 PROCEDURE — 84481 FREE ASSAY (FT-3): CPT

## 2023-12-22 PROCEDURE — 84436 ASSAY OF TOTAL THYROXINE: CPT

## 2023-12-22 PROCEDURE — 85025 COMPLETE CBC W/AUTO DIFF WBC: CPT

## 2023-12-23 ENCOUNTER — HOSPITAL ENCOUNTER (OUTPATIENT)
Facility: MEDICAL CENTER | Age: 63
End: 2023-12-23
Attending: INTERNAL MEDICINE
Payer: COMMERCIAL

## 2023-12-23 LAB — LACTOFERRIN STL QL IA: NEGATIVE

## 2023-12-23 PROCEDURE — 83993 ASSAY FOR CALPROTECTIN FECAL: CPT

## 2023-12-23 PROCEDURE — 83630 LACTOFERRIN FECAL (QUAL): CPT

## 2023-12-24 LAB — OVA AND PARASITE, FECAL INTERPRETATION Q0595: NEGATIVE

## 2023-12-27 LAB — CALPROTECTIN STL-MCNT: 26 UG/G

## 2023-12-28 ENCOUNTER — TELEPHONE (OUTPATIENT)
Dept: MEDICAL GROUP | Facility: PHYSICIAN GROUP | Age: 63
End: 2023-12-28

## 2023-12-28 ENCOUNTER — HOSPITAL ENCOUNTER (OUTPATIENT)
Dept: RADIOLOGY | Facility: MEDICAL CENTER | Age: 63
End: 2023-12-28
Attending: INTERNAL MEDICINE
Payer: COMMERCIAL

## 2023-12-28 DIAGNOSIS — R19.7 DIARRHEA, UNSPECIFIED TYPE: ICD-10-CM

## 2023-12-28 DIAGNOSIS — R19.4 ALTERED BOWEL HABITS: ICD-10-CM

## 2023-12-28 DIAGNOSIS — R63.4 UNINTENTIONAL WEIGHT LOSS: ICD-10-CM

## 2023-12-28 DIAGNOSIS — K52.9 CHRONIC DIARRHEA OF UNKNOWN ORIGIN: ICD-10-CM

## 2023-12-28 PROCEDURE — 74177 CT ABD & PELVIS W/CONTRAST: CPT

## 2023-12-28 PROCEDURE — 700117 HCHG RX CONTRAST REV CODE 255: Performed by: INTERNAL MEDICINE

## 2023-12-28 RX ADMIN — IOHEXOL 100 ML: 350 INJECTION, SOLUTION INTRAVENOUS at 10:20

## 2023-12-28 RX ADMIN — IOHEXOL 25 ML: 240 INJECTION, SOLUTION INTRATHECAL; INTRAVASCULAR; INTRAVENOUS; ORAL at 10:20

## 2023-12-28 NOTE — TELEPHONE ENCOUNTER
Spoke to pharmacist Juan Carlos - Asked if he can take a verbal to refill medication because it won't allow the provider to do this electronically.     Refilled - C-progesterone 60 mg/gm CR - apply 4 clicks topically daily

## 2023-12-29 ENCOUNTER — HOSPITAL ENCOUNTER (OUTPATIENT)
Dept: RADIOLOGY | Facility: MEDICAL CENTER | Age: 63
End: 2023-12-29
Attending: INTERNAL MEDICINE
Payer: COMMERCIAL

## 2023-12-29 DIAGNOSIS — R93.89 ABNORMAL CT SCAN: ICD-10-CM

## 2023-12-29 PROCEDURE — 700117 HCHG RX CONTRAST REV CODE 255: Performed by: INTERNAL MEDICINE

## 2023-12-29 PROCEDURE — 71260 CT THORAX DX C+: CPT

## 2023-12-29 RX ADMIN — IOHEXOL 75 ML: 350 INJECTION, SOLUTION INTRAVENOUS at 12:30

## 2023-12-30 ENCOUNTER — TELEPHONE (OUTPATIENT)
Dept: MEDICAL GROUP | Facility: MEDICAL CENTER | Age: 63
End: 2023-12-30
Payer: COMMERCIAL

## 2023-12-31 NOTE — TELEPHONE ENCOUNTER
"Outpatient page returned approximately 17:51 on 12/30/2023. Spoke with patient regarding recent CAT scan results that were done for work up as part of chronic diarrhea by her GI doc. Incidentally found to have a large mass concerning for malignancy. Fortunately she is otherwise completely asymptomatic.    For context, patient was seen at a local urgent care on 12/18/2023 For chronic diarrhea; patient was later seen by her gastroenterologist Dr Guido Fragoso who ordered a CAT scan of her abdomen with the following results from 12/28/23:    2.  1.2 cm ill-defined opacity right upper lobe indeterminate   solid mass in the anterior left hemithorax abutting the left heart border. This is suspicious for malignancy. Recommend CT of the chest with IV contrast.  2.  No intra-abdominal or pelvic lymphadenopathy or other evidence of malignant process in the abdomen or pelvis..    A  CT of her chest was then performed on 12/29/23 with following results:   \"6.8 x 9.9 x 13.0 cm lobulated mass within the left anterior mediastinum. Differential considerations include lymphoma, thymoma, teratoma.\"    The  also sent an in basket message to cardiology as well as pulmonology on patient's behalf regarding findings. I reviewed results with patient and informed her that no cardiac structures are currently affected by the mass. Recommended patient discuss next steps with her PCP for better coordination of care. Discussed that she will likely need additional testing and imaging to confirm diagnosis and guide management. Of note, patient also recently lost her  in November, but appears to be coping well. She endorses good support system with close friends and her dog who are with her now.    Lesli Lee M.D.  Family Medicine Physician  [facility]     "

## 2024-01-01 NOTE — PROCEDURE: BIOPSY BY SHAVE METHOD
Bill [id number] For Specimen Handling/Conveyance To Laboratory?: no
Dressing: bandage
Size Of Lesion In Cm: 0
Wound Care: Vaseline
Lab Facility: 
Was A Bandage Applied: Yes
Notification Instructions: Patient will be notified of biopsy results. However, patient instructed to call the office if not contacted within 2 weeks.
Anesthesia Type: 1% lidocaine with epinephrine
Hemostasis: Aluminum Chloride and Electrocautery
Electrodesiccation And Curettage Text: The wound bed was treated with electrodesiccation and curettage after the biopsy was performed.
Depth Of Biopsy: dermis
Type Of Destruction Used: Electrodesiccation
Lab: [id number]
Anesthesia Volume In Cc: 2
Biopsy Method: Personna blade
Consent: Written consent was obtained and risks were reviewed including but not limited to scarring, infection, bleeding, scabbing, incomplete removal, nerve damage and allergy to anesthesia.
Billing Type: Third-Party Bill
Electrodesiccation Text: The wound bed was treated with electrodesiccation after the biopsy was performed.
Post-Care Instructions: I reviewed with the patient in detail post-care instructions. Patient is to keep the biopsy site dry overnight, and then apply bacitracin twice daily until healed. Patient may apply hydrogen peroxide soaks to remove any crusting.
Cryotherapy Text: The wound bed was treated with cryotherapy after the biopsy was performed.
Biopsy Type: H and E
Curettage Text: The wound bed was treated with curettage after the biopsy was performed.
Silver Nitrate Text: The wound bed was treated with silver nitrate after the biopsy was performed.
Detail Level: Detailed
.

## 2024-01-02 DIAGNOSIS — J98.59 MEDIASTINAL MASS: ICD-10-CM

## 2024-01-02 NOTE — PROGRESS NOTES
Urgent referral placed to pulmonary for the following finding on CT chest:    IMPRESSION:     1.  6.8 x 9.9 x 13.0 cm lobulated mass within the left anterior mediastinum. Differential considerations include lymphoma, thymoma, teratoma.     2.  1.2 cm ill-defined opacity right upper lobe indeterminate.     3.  No pleural effusions.

## 2024-01-04 ENCOUNTER — HOSPITAL ENCOUNTER (OUTPATIENT)
Dept: RADIOLOGY | Facility: MEDICAL CENTER | Age: 64
End: 2024-01-04
Attending: INTERNAL MEDICINE
Payer: COMMERCIAL

## 2024-01-04 DIAGNOSIS — Z12.31 VISIT FOR SCREENING MAMMOGRAM: ICD-10-CM

## 2024-01-04 PROCEDURE — 77067 SCR MAMMO BI INCL CAD: CPT

## 2024-01-08 ENCOUNTER — OFFICE VISIT (OUTPATIENT)
Dept: MEDICAL GROUP | Facility: PHYSICIAN GROUP | Age: 64
End: 2024-01-08
Payer: COMMERCIAL

## 2024-01-08 VITALS
WEIGHT: 148.9 LBS | BODY MASS INDEX: 23.93 KG/M2 | RESPIRATION RATE: 16 BRPM | TEMPERATURE: 99.1 F | DIASTOLIC BLOOD PRESSURE: 70 MMHG | HEART RATE: 74 BPM | HEIGHT: 66 IN | SYSTOLIC BLOOD PRESSURE: 122 MMHG | OXYGEN SATURATION: 97 %

## 2024-01-08 DIAGNOSIS — R92.2 DENSE BREAST: ICD-10-CM

## 2024-01-08 DIAGNOSIS — R92.30 DENSE BREAST: ICD-10-CM

## 2024-01-08 DIAGNOSIS — F41.1 GAD (GENERALIZED ANXIETY DISORDER): ICD-10-CM

## 2024-01-08 DIAGNOSIS — J98.59 MEDIASTINAL MASS: ICD-10-CM

## 2024-01-08 DIAGNOSIS — R63.4 UNINTENTIONAL WEIGHT LOSS: ICD-10-CM

## 2024-01-08 PROBLEM — R93.89 ABNORMAL FINDINGS ON DIAGNOSTIC IMAGING OF OTHER SPECIFIED BODY STRUCTURES: Status: ACTIVE | Noted: 2024-01-08

## 2024-01-08 PROCEDURE — 99214 OFFICE O/P EST MOD 30 MIN: CPT | Performed by: PHYSICIAN ASSISTANT

## 2024-01-08 PROCEDURE — 3074F SYST BP LT 130 MM HG: CPT | Performed by: PHYSICIAN ASSISTANT

## 2024-01-08 PROCEDURE — 3078F DIAST BP <80 MM HG: CPT | Performed by: PHYSICIAN ASSISTANT

## 2024-01-08 RX ORDER — VENLAFAXINE 75 MG/1
75 TABLET ORAL 3 TIMES DAILY
Qty: 90 TABLET | Refills: 0 | Status: SHIPPED | OUTPATIENT
Start: 2024-01-08 | End: 2024-01-08

## 2024-01-08 RX ORDER — VENLAFAXINE HYDROCHLORIDE 75 MG/1
75 CAPSULE, EXTENDED RELEASE ORAL DAILY
Qty: 90 CAPSULE | Refills: 0 | Status: SHIPPED | OUTPATIENT
Start: 2024-01-08 | End: 2024-02-06

## 2024-01-08 RX ORDER — MONTELUKAST SODIUM 4 MG/1
60 TABLET, CHEWABLE ORAL
COMMUNITY
Start: 2023-12-21 | End: 2024-01-08

## 2024-01-08 ASSESSMENT — ENCOUNTER SYMPTOMS
FEVER: 0
RESPIRATORY SYMPTOMS COMMENTS: NO
WHEEZING: 0
CHEST TIGHTNESS: 0
SHORTNESS OF BREATH: 0
SHORTNESS OF BREATH: 0
DYSPNEA AT REST: 0
HEMOPTYSIS: 0
CHILLS: 0

## 2024-01-08 ASSESSMENT — FIBROSIS 4 INDEX: FIB4 SCORE: 1.36

## 2024-01-08 NOTE — PROGRESS NOTES
"Subjective:     CC:     HPI:   Lizy, patient of Dr. Talavera, presents today with the following:    Has had a lot of GI issues since 2023, when her mom went into hospice care.    Mom  2023.      Lost  2023, COVID pneumonia, [facility]    Has been on effexor for years;  was manic and OCD; would like to come off this.    Has lost 30# since October, feels it's stress related.     Saw Dr. Fragoso [facility]. Abdomen/pelvis CT normal except for findings related to the lungs. labs/stool tests were all normal.     CT chest was ordered showing a mass.  Has an appointment with pulmonology 2024.    Has a great support group which has helped her.    Will let me know if she needs a referral for therapy.    Problem   Abnormal Findings On Diagnostic Imaging of Other Specified Body Structures   Unintentional Weight Loss         ROS:  Review of Systems   Constitutional:  Negative for chills and fever.   Respiratory:  Negative for shortness of breath.    Cardiovascular:  Negative for chest pain.       Objective:     Exam:  /70 (BP Location: Left arm, Patient Position: Sitting, BP Cuff Size: Adult)   Pulse 74   Temp 37.3 °C (99.1 °F) (Temporal)   Resp 16   Ht 1.676 m (5' 6\")   Wt 67.5 kg (148 lb 14.4 oz)   SpO2 97%   BMI 24.03 kg/m²  Body mass index is 24.03 kg/m².    Physical Exam  Vitals reviewed.   Constitutional:       General: She is not in acute distress.     Appearance: Normal appearance.   Pulmonary:      Effort: Pulmonary effort is normal.   Neurological:      General: No focal deficit present.      Mental Status: She is alert.   Psychiatric:         Mood and Affect: Mood normal.         Behavior: Behavior normal.         Judgment: Judgment normal.              Assessment & Plan:     63 y.o. female with the following -     1. Mediastinal mass  New diagnosis.  Discussed with patient that pulmonology manage any workup that needs to happen.    Establishes with pulmonology " 1/10/2024:  Pulmonary/sleep c  [address], [address]  [place] [address]  Phone: [phone]    2. Unintentional weight loss  Chronic, uncontrolled.  Has had a tremendous amount of stress this year.  Has lost 30 pounds since mid October.    3. DERREK (generalized anxiety disorder)  Chronic, stable.  Patient would like to wean her venlafaxine despite new diagnosis of a lung mass.  Decreasing patient to 75 mg.  Advised that she communicate through [employer] how she was doing on the new dose.  Declines referral to therapy at this time but will let me know if she changes her mind.  Reports having a very good support system.    - venlafaxine (EFFEXOR) 75 MG Tab; Take 1 Tablet by daily    4. Dense breast  Normal mammogram.  Patient reports having dense breast and is requesting ultrasound.    - US-SCREENING WHOLE BREAST BILATERAL (3D SCREENING); Future    Healthcare Maintenance:        Return if symptoms worsen or fail to improve.    Please note that this dictation was created using voice recognition software. I have made every reasonable attempt to correct obvious errors, but I expect that there are errors of grammar and possibly content that I did not discover before finalizing the note.

## 2024-01-10 ENCOUNTER — OFFICE VISIT (OUTPATIENT)
Dept: SLEEP MEDICINE | Facility: MEDICAL CENTER | Age: 64
End: 2024-01-10
Attending: INTERNAL MEDICINE
Payer: COMMERCIAL

## 2024-01-10 VITALS
HEIGHT: 66 IN | HEART RATE: 78 BPM | BODY MASS INDEX: 23.63 KG/M2 | SYSTOLIC BLOOD PRESSURE: 123 MMHG | OXYGEN SATURATION: 98 % | WEIGHT: 147 LBS | DIASTOLIC BLOOD PRESSURE: 70 MMHG

## 2024-01-10 DIAGNOSIS — J98.59 MEDIASTINAL MASS: ICD-10-CM

## 2024-01-10 DIAGNOSIS — R91.1 LUNG NODULE: ICD-10-CM

## 2024-01-10 PROCEDURE — 3078F DIAST BP <80 MM HG: CPT | Performed by: INTERNAL MEDICINE

## 2024-01-10 PROCEDURE — 99204 OFFICE O/P NEW MOD 45 MIN: CPT | Performed by: INTERNAL MEDICINE

## 2024-01-10 PROCEDURE — 99212 OFFICE O/P EST SF 10 MIN: CPT | Performed by: INTERNAL MEDICINE

## 2024-01-10 PROCEDURE — 3074F SYST BP LT 130 MM HG: CPT | Performed by: INTERNAL MEDICINE

## 2024-01-10 ASSESSMENT — ENCOUNTER SYMPTOMS
CLAUDICATION: 0
FEVER: 0
ORTHOPNEA: 0
EYE DISCHARGE: 0
TREMORS: 0
EYE PAIN: 0
DIAPHORESIS: 0
HEMOPTYSIS: 0
NAUSEA: 0
SPUTUM PRODUCTION: 0
DIZZINESS: 0
HEADACHES: 0
FALLS: 0
DOUBLE VISION: 0
CHILLS: 0
SINUS PAIN: 0
SPEECH CHANGE: 0
DEPRESSION: 0
SORE THROAT: 0
BLURRED VISION: 0
WEIGHT LOSS: 0
PHOTOPHOBIA: 0
EYE REDNESS: 0
CONSTIPATION: 0
COUGH: 0
HEARTBURN: 0
NECK PAIN: 0
ABDOMINAL PAIN: 0
SHORTNESS OF BREATH: 0
FOCAL WEAKNESS: 0
PND: 0
DIARRHEA: 1
WEAKNESS: 0
STRIDOR: 0
VOMITING: 0
WHEEZING: 0
BACK PAIN: 0
MYALGIAS: 0
PALPITATIONS: 0

## 2024-01-10 ASSESSMENT — PATIENT HEALTH QUESTIONNAIRE - PHQ9: CLINICAL INTERPRETATION OF PHQ2 SCORE: 0

## 2024-01-10 ASSESSMENT — FIBROSIS 4 INDEX: FIB4 SCORE: 1.36

## 2024-01-10 NOTE — PROGRESS NOTES
Chief Complaint   Patient presents with    New Patient     REF DR. RICARDO DISLA FOR Mediastinal mass    Results     CT CHEST 12/29/23         HPI: This patient is a 63 y.o. female presenting for evaluation of anterior mediastinal mass and RUL nodule. 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 She is a life-long non-smoker. She has a family hx of thyroid d/o including cancer (sister). No known occupational or environmental exposures. She presents today for incidental finding of 13 cm x 7 cm anterior mediastinal mass with passive compression on L lung. The pt began experiencing daily diarrhea after the passing of her mother in May of 2023 then in October she and her  contracted covid. Her sxs were mild but her  was hospitalized and intubated for respiratory failure and ultimately passed from complication of covid, adrenal insufficiency and infected hardware of his spine with associated sepsis. Since that time she has lost 30 lbs unintentionally and was seen by GI. CT abd/pelvis from 12/28/23 showed no concerning findings with respect to GI tract/abdomen/pelvis however it did catch the inferior portion of a large mediastinal mass which was f/u with CT chest on 12/29/23.  This showed left sided anterior mediastinal mass up to 13 cm in max dimension w/o bronchial or vascular compromise. She does have 1.2 cm GGO in the inferior RUL. No LAD. She denies f/c, night sweats. No chest pain. No edema. No SOB. No cough. She was referred to [facility]ology and [facility] by Dr. Fragoso with GI and to me by PCP.    Past Medical History:   Diagnosis Date    Acquired hypothyroidism 11/1/2018    Anesthesia     PONV    Anxiety reaction 11/1/2018    Chronic diarrhea 11/18/2020    Chronic midline low back pain without sciatica 11/18/2020    Family history of abdominal aortic aneurysm 2/8/2019    Herpes labialis 2/8/2019    Evans's neuroma of left foot 11/8/2018       Social History      Socioeconomic History    Marital status:      Spouse name: Not on file    Number of children: Not on file    Years of education: Not on file    Highest education level: Not on file   Occupational History    Not on file   Tobacco Use    Smoking status: Never    Smokeless tobacco: Never   Vaping Use    Vaping Use: Never used   Substance and Sexual Activity    Alcohol use: Not Currently     Comment: 1xmonth    Drug use: No    Sexual activity: Not Currently     Partners: Male     Comment: , retired from [employer]   Other Topics Concern    Not on file   Social History Narrative    Not on file     Social Determinants of Health     Financial Resource Strain: Not on file   Food Insecurity: Not on file   Transportation Needs: Not on file   Physical Activity: Not on file   Stress: Not on file   Social Connections: Not on file   Intimate Partner Violence: Not on file   Housing Stability: Not on file       Family History   Problem Relation Age of Onset    Breast Cancer Paternal Aunt     Cancer Father     Hyperlipidemia Father     Hypertension Father     Other Father         multiple aneurisms    Thyroid Mother     Cancer Mother         melanoma skin and eye    Lung Disease Mother     Cancer Cousin        Current Outpatient Medications on File Prior to Visit   Medication Sig Dispense Refill    venlafaxine XR (EFFEXOR XR) 75 MG CAPSULE SR 24 HR Take 1 Capsule by mouth every day. 90 Capsule 0    levothyroxine (SYNTHROID) 75 MCG Tab Take 1 Tablet by mouth every day.      valacyclovir (VALTREX) 1 GM Tab TAKE 1 TABLET BY MOUTH EVERY DAY 90 Tablet 3    NON SPECIFIED C-E2/E3 25 mg/mlTake 0.1ml SL daily 5 Each 1    Non Formulary Request C-progesterone 60 mg/gm CR - apply 4 clicks topically daily 90 Each 3     No current facility-administered medications on file prior to visit.       Allergies: Patient has no known allergies.    ROS:   Review of Systems   Constitutional:  Negative for chills, diaphoresis,  "fever, malaise/fatigue and weight loss.   HENT:  Negative for congestion, ear discharge, ear pain, hearing loss, nosebleeds, sinus pain, sore throat and tinnitus.    Eyes:  Negative for blurred vision, double vision, photophobia, pain, discharge and redness.   Respiratory:  Negative for cough, hemoptysis, sputum production, shortness of breath, wheezing and stridor.    Cardiovascular:  Negative for chest pain, palpitations, orthopnea, claudication, leg swelling and PND.   Gastrointestinal:  Positive for diarrhea. Negative for abdominal pain, constipation, heartburn, nausea and vomiting.   Genitourinary:  Negative for dysuria and urgency.   Musculoskeletal:  Negative for back pain, falls, joint pain, myalgias and neck pain.   Skin:  Negative for itching and rash.   Neurological:  Negative for dizziness, tremors, speech change, focal weakness, weakness and headaches.   Endo/Heme/Allergies:  Negative for environmental allergies.   Psychiatric/Behavioral:  Negative for depression.        /70 (BP Location: Right arm, Patient Position: Sitting, BP Cuff Size: Adult)   Pulse 78   Ht 1.676 m (5' 6\")   Wt 66.7 kg (147 lb)   SpO2 98%     Physical Exam:  Physical Exam  Constitutional:       General: She is not in acute distress.     Appearance: Normal appearance. She is well-developed and normal weight.   HENT:      Head: Normocephalic and atraumatic.      Right Ear: External ear normal.      Left Ear: External ear normal.      Nose: Nose normal. No congestion.      Mouth/Throat:      Mouth: Mucous membranes are moist.      Pharynx: Oropharynx is clear. No oropharyngeal exudate.   Eyes:      General: No scleral icterus.     Extraocular Movements: Extraocular movements intact.      Conjunctiva/sclera: Conjunctivae normal.      Pupils: Pupils are equal, round, and reactive to light.   Neck:      Vascular: No JVD.      Trachea: No tracheal deviation.   Cardiovascular:      Rate and Rhythm: Normal rate and regular rhythm. "      Heart sounds: Normal heart sounds. No murmur heard.     No friction rub. No gallop.   Pulmonary:      Effort: Pulmonary effort is normal. No accessory muscle usage or respiratory distress.      Breath sounds: Normal breath sounds. No wheezing or rales.   Abdominal:      General: There is no distension.      Palpations: Abdomen is soft.      Tenderness: There is no abdominal tenderness.   Musculoskeletal:         General: No tenderness or deformity. Normal range of motion.      Cervical back: Normal range of motion and neck supple.      Right lower leg: No edema.      Left lower leg: No edema.   Lymphadenopathy:      Cervical: No cervical adenopathy.   Skin:     General: Skin is warm and dry.      Findings: No rash.      Nails: There is no clubbing.   Neurological:      Mental Status: She is alert and oriented to person, place, and time.      Cranial Nerves: No cranial nerve deficit.      Gait: Gait normal.   Psychiatric:         Behavior: Behavior normal.         PFTs as reviewed by me personally:NA    Imaging as reviewed by me personally:as per hPI    Assessment:  1. Mediastinal mass  CT-BIOPSY-LUNG/MEDIASTINUM W/GUIDE LEFT    CANCELED: IR-BIOPSY-LUNG/MEDIASTINUM W/GUIDE      2. Lung nodule            Plan:  Incidental finding and I am not sure if related to her diarrhea and weight loss certainly concerning for malignancy. Also in the differential dx based on location would be thymoma, teratoma and thyroid malignancy which might explain some of her sxs.  While I think a PET may be indicated secondarily, primary concern is pathology. I do think she should see [facility] and oncology pending biopsy and we have ordered to be done through CT guidance with IR as soon as can be scheduled. Pending results we may f/u with PET to further evaluation RUL lesion.  2.   See discussion above.   Return in about 3 months (around 4/10/2024) for biopsy results .

## 2024-01-12 ENCOUNTER — APPOINTMENT (OUTPATIENT)
Dept: ADMISSIONS | Facility: MEDICAL CENTER | Age: 64
End: 2024-01-12
Attending: NURSE PRACTITIONER
Payer: COMMERCIAL

## 2024-01-17 ENCOUNTER — APPOINTMENT (RX ONLY)
Dept: URBAN - METROPOLITAN AREA CLINIC 4 | Facility: CLINIC | Age: 64
Setting detail: DERMATOLOGY
End: 2024-01-17

## 2024-01-17 DIAGNOSIS — Z71.89 OTHER SPECIFIED COUNSELING: ICD-10-CM

## 2024-01-17 DIAGNOSIS — L57.0 ACTINIC KERATOSIS: ICD-10-CM

## 2024-01-17 DIAGNOSIS — D22 MELANOCYTIC NEVI: ICD-10-CM

## 2024-01-17 DIAGNOSIS — L81.4 OTHER MELANIN HYPERPIGMENTATION: ICD-10-CM

## 2024-01-17 DIAGNOSIS — L82.1 OTHER SEBORRHEIC KERATOSIS: ICD-10-CM

## 2024-01-17 DIAGNOSIS — D18.0 HEMANGIOMA: ICD-10-CM

## 2024-01-17 PROBLEM — D22.5 MELANOCYTIC NEVI OF TRUNK: Status: ACTIVE | Noted: 2024-01-17

## 2024-01-17 PROBLEM — D18.01 HEMANGIOMA OF SKIN AND SUBCUTANEOUS TISSUE: Status: ACTIVE | Noted: 2024-01-17

## 2024-01-17 PROCEDURE — ? COUNSELING

## 2024-01-17 PROCEDURE — ? LIQUID NITROGEN

## 2024-01-17 PROCEDURE — ? SUNSCREEN RECOMMENDATIONS

## 2024-01-17 PROCEDURE — 99213 OFFICE O/P EST LOW 20 MIN: CPT | Mod: 25

## 2024-01-17 PROCEDURE — 17000 DESTRUCT PREMALG LESION: CPT

## 2024-01-17 ASSESSMENT — LOCATION ZONE DERM
LOCATION ZONE: HAND
LOCATION ZONE: TRUNK
LOCATION ZONE: ARM

## 2024-01-17 ASSESSMENT — LOCATION DETAILED DESCRIPTION DERM
LOCATION DETAILED: LEFT POSTERIOR SHOULDER
LOCATION DETAILED: SUPERIOR LUMBAR SPINE
LOCATION DETAILED: INFERIOR THORACIC SPINE
LOCATION DETAILED: EPIGASTRIC SKIN
LOCATION DETAILED: RIGHT RADIAL DORSAL HAND
LOCATION DETAILED: RIGHT POSTERIOR SHOULDER

## 2024-01-17 ASSESSMENT — LOCATION SIMPLE DESCRIPTION DERM
LOCATION SIMPLE: LOWER BACK
LOCATION SIMPLE: UPPER BACK
LOCATION SIMPLE: RIGHT HAND
LOCATION SIMPLE: LEFT SHOULDER
LOCATION SIMPLE: RIGHT SHOULDER
LOCATION SIMPLE: ABDOMEN

## 2024-01-17 NOTE — PROCEDURE: LIQUID NITROGEN
Application Tool (Optional): Liquid Nitrogen Sprayer
Number Of Freeze-Thaw Cycles: 2 freeze-thaw cycles
Render Post-Care Instructions In Note?: no
Consent: The patient's consent was obtained including but not limited to risks of crusting, scabbing, blistering, scarring, darker or lighter pigmentary change, recurrence, incomplete removal and infection.
Show Aperture Variable?: Yes
Detail Level: Simple
Aperture Size (Optional): C
Duration Of Freeze Thaw-Cycle (Seconds): 2
Post-Care Instructions: I reviewed with the patient in detail post-care instructions. Patient is to wear sunprotection, and avoid picking at any of the treated lesions. Pt may apply Vaseline to crusted or scabbing areas.

## 2024-01-19 ENCOUNTER — PRE-ADMISSION TESTING (OUTPATIENT)
Dept: ADMISSIONS | Facility: MEDICAL CENTER | Age: 64
End: 2024-01-19
Attending: NURSE PRACTITIONER
Payer: COMMERCIAL

## 2024-01-19 RX ORDER — MULTIVIT WITH MINERALS/LUTEIN
1000 TABLET ORAL
COMMUNITY

## 2024-01-22 ENCOUNTER — APPOINTMENT (OUTPATIENT)
Dept: ADMISSIONS | Facility: MEDICAL CENTER | Age: 64
End: 2024-01-22
Attending: INTERNAL MEDICINE
Payer: COMMERCIAL

## 2024-01-22 DIAGNOSIS — Z01.812 PRE-OPERATIVE LABORATORY EXAMINATION: ICD-10-CM

## 2024-01-22 LAB
ERYTHROCYTE [DISTWIDTH] IN BLOOD BY AUTOMATED COUNT: 53.5 FL (ref 35.9–50)
HCT VFR BLD AUTO: 38.9 % (ref 37–47)
HGB BLD-MCNC: 13 G/DL (ref 12–16)
INR PPP: 1.01 (ref 0.87–1.13)
MCH RBC QN AUTO: 29 PG (ref 27–33)
MCHC RBC AUTO-ENTMCNC: 33.4 G/DL (ref 32.2–35.5)
MCV RBC AUTO: 86.8 FL (ref 81.4–97.8)
PLATELET # BLD AUTO: 244 K/UL (ref 164–446)
PMV BLD AUTO: 12.9 FL (ref 9–12.9)
PROTHROMBIN TIME: 13.4 SEC (ref 12–14.6)
RBC # BLD AUTO: 4.48 M/UL (ref 4.2–5.4)
WBC # BLD AUTO: 7.9 K/UL (ref 4.8–10.8)

## 2024-01-22 PROCEDURE — 36415 COLL VENOUS BLD VENIPUNCTURE: CPT

## 2024-01-22 PROCEDURE — 85610 PROTHROMBIN TIME: CPT

## 2024-01-22 PROCEDURE — 85027 COMPLETE CBC AUTOMATED: CPT

## 2024-01-24 ENCOUNTER — APPOINTMENT (OUTPATIENT)
Dept: RADIOLOGY | Facility: MEDICAL CENTER | Age: 64
End: 2024-01-24
Attending: RADIOLOGY
Payer: COMMERCIAL

## 2024-01-24 ENCOUNTER — HOSPITAL ENCOUNTER (OUTPATIENT)
Facility: MEDICAL CENTER | Age: 64
End: 2024-01-24
Attending: INTERNAL MEDICINE | Admitting: INTERNAL MEDICINE
Payer: COMMERCIAL

## 2024-01-24 ENCOUNTER — APPOINTMENT (OUTPATIENT)
Dept: RADIOLOGY | Facility: MEDICAL CENTER | Age: 64
End: 2024-01-24
Attending: INTERNAL MEDICINE
Payer: COMMERCIAL

## 2024-01-24 VITALS
OXYGEN SATURATION: 93 % | HEART RATE: 61 BPM | WEIGHT: 143.96 LBS | BODY MASS INDEX: 23.14 KG/M2 | SYSTOLIC BLOOD PRESSURE: 147 MMHG | TEMPERATURE: 97.1 F | RESPIRATION RATE: 16 BRPM | HEIGHT: 66 IN | DIASTOLIC BLOOD PRESSURE: 68 MMHG

## 2024-01-24 DIAGNOSIS — J98.59 MEDIASTINAL MASS: ICD-10-CM

## 2024-01-24 LAB — PATHOLOGY CONSULT NOTE: NORMAL

## 2024-01-24 PROCEDURE — 71045 X-RAY EXAM CHEST 1 VIEW: CPT

## 2024-01-24 PROCEDURE — 160046 HCHG PACU - 1ST 60 MINS PHASE II

## 2024-01-24 PROCEDURE — 88341 IMHCHEM/IMCYTCHM EA ADD ANTB: CPT | Mod: 91

## 2024-01-24 PROCEDURE — 88307 TISSUE EXAM BY PATHOLOGIST: CPT

## 2024-01-24 PROCEDURE — 700105 HCHG RX REV CODE 258: Performed by: RADIOLOGY

## 2024-01-24 PROCEDURE — 88360 TUMOR IMMUNOHISTOCHEM/MANUAL: CPT

## 2024-01-24 PROCEDURE — 88342 IMHCHEM/IMCYTCHM 1ST ANTB: CPT

## 2024-01-24 PROCEDURE — 160047 HCHG PACU  - EA ADDL 30 MINS PHASE II

## 2024-01-24 PROCEDURE — 700111 HCHG RX REV CODE 636 W/ 250 OVERRIDE (IP)

## 2024-01-24 PROCEDURE — 160002 HCHG RECOVERY MINUTES (STAT)

## 2024-01-24 PROCEDURE — 4410509 CT-BIOPSY-LUNG/MEDIASTINUM W/ GUIDE

## 2024-01-24 RX ORDER — SODIUM CHLORIDE 9 MG/ML
500 INJECTION, SOLUTION INTRAVENOUS
Status: DISPENSED | OUTPATIENT
Start: 2024-01-24 | End: 2024-01-24

## 2024-01-24 RX ORDER — HYDROMORPHONE HYDROCHLORIDE 1 MG/ML
0.25 INJECTION, SOLUTION INTRAMUSCULAR; INTRAVENOUS; SUBCUTANEOUS
Status: DISCONTINUED | OUTPATIENT
Start: 2024-01-24 | End: 2024-01-24 | Stop reason: HOSPADM

## 2024-01-24 RX ORDER — SODIUM CHLORIDE 9 MG/ML
1000 INJECTION, SOLUTION INTRAVENOUS CONTINUOUS
Status: DISCONTINUED | OUTPATIENT
Start: 2024-01-24 | End: 2024-01-24 | Stop reason: HOSPADM

## 2024-01-24 RX ORDER — ONDANSETRON 2 MG/ML
4 INJECTION INTRAMUSCULAR; INTRAVENOUS PRN
Status: ACTIVE | OUTPATIENT
Start: 2024-01-24 | End: 2024-01-24

## 2024-01-24 RX ORDER — OXYCODONE HYDROCHLORIDE 5 MG/1
2.5 TABLET ORAL
Status: DISCONTINUED | OUTPATIENT
Start: 2024-01-24 | End: 2024-01-24 | Stop reason: HOSPADM

## 2024-01-24 RX ORDER — MIDAZOLAM HYDROCHLORIDE 1 MG/ML
INJECTION INTRAMUSCULAR; INTRAVENOUS
Status: COMPLETED
Start: 2024-01-24 | End: 2024-01-24

## 2024-01-24 RX ORDER — MIDAZOLAM HYDROCHLORIDE 1 MG/ML
.5-2 INJECTION INTRAMUSCULAR; INTRAVENOUS PRN
Status: ACTIVE | OUTPATIENT
Start: 2024-01-24 | End: 2024-01-24

## 2024-01-24 RX ADMIN — MIDAZOLAM HYDROCHLORIDE 2 MG: 1 INJECTION INTRAMUSCULAR; INTRAVENOUS at 11:30

## 2024-01-24 RX ADMIN — MIDAZOLAM HYDROCHLORIDE 2 MG: 1 INJECTION, SOLUTION INTRAMUSCULAR; INTRAVENOUS at 11:30

## 2024-01-24 RX ADMIN — SODIUM CHLORIDE 1000 ML: 9 INJECTION, SOLUTION INTRAVENOUS at 10:02

## 2024-01-24 RX ADMIN — FENTANYL CITRATE 50 MCG: 50 INJECTION, SOLUTION INTRAMUSCULAR; INTRAVENOUS at 11:30

## 2024-01-24 ASSESSMENT — PAIN DESCRIPTION - PAIN TYPE
TYPE: SURGICAL PAIN

## 2024-01-24 ASSESSMENT — FIBROSIS 4 INDEX
FIB4 SCORE: 1.25
FIB4 SCORE: 1.25

## 2024-01-24 NOTE — PROGRESS NOTES
Pt presents to CT 4. Pt. was consented by MD in Pre-op, confirmed by this RN and consent at bedside. Pt transferred to procedure table in supine position. Patient underwent a Left Lung Biopsy by Dr. Paul. Procedure site was marked by MD and verified using imaging guidance. Pt placed on monitor, prepped and draped in a sterile fashion. Vitals were taken every 5 minutes and remained stable during procedure (see doc flow sheet for results). CO2 waveform capnography was monitored and remained WNL throughout procedure. Report called to *** RN. Pt transported by stretcher with RN to ***.     Specimen: *** in *** hand delivered to lab.      REF: ***  LOT: ***  EXP: ***

## 2024-01-24 NOTE — OR NURSING
1156 Pt to PPU from IR. Report from RN. Awake and alert on arrival to PACU. Respirations even and unlabored. VSS. Minimal pain, no nausea.     1400 First CXR clear, pt tolerating sips. Friend at bedside. No changes in assessment.     1605 Discharge orders received. Meets discharge criteria at this time, CXR x2 post lung needle biopsy both stable, without pneumothorax. Minimal pain. No nausea. Tolerating PO. On RA. All lines and monitors discontinued. Reviewed discharge paperwork with pt and friend. Discussed diet, activity, medications, follow up care and worsening symptoms. No questions at this time. Pt to be discharged to home via private vehicle. Offered hospital escort and wc, pt declined, ambulated out of department with RN, friend, and all belongings.

## 2024-01-24 NOTE — OR SURGEON
Immediate Post- Operative Note    PostOp Diagnosis: LEFT ANTERIOR MEDIASTINAL MASS      Procedure(s): CT GUIDED LEFT ANTERIOR MEDIASTINAL MASS    18G CORES X 6. FORMALIN X4. RPMI X 2.      Estimated Blood Loss: <1 CC      FINDINGS: NEEDLE CONFIRMED FOR EACH SAMPLE. NEEDLE CURVED TO ALLOW VARIOUS AREAS OF SAMPLING.        Complications: NONE          1/24/2024     11:46 AM     Brian Paul M.D.

## 2024-01-24 NOTE — DISCHARGE INSTRUCTIONS
Needle Biopsy, Care After  The following information offers guidance on how to care for yourself after your procedure. Your health care provider may also give you more specific instructions. If you have problems or questions, contact your health care provider.  What can I expect after the procedure?  After the procedure, it is common to have:  Soreness, pain, and tenderness where a tissue sample was taken (biopsy site).  Bruising or mild pain at the biopsy site.  These symptoms should go away after a few days.  Follow these instructions at home:  Biopsy site care  Follow instructions from your health care provider about how to take care of your biopsy site. Make sure you:  Wash your hands with soap and water for at least 20 seconds before and after you change your bandage (dressing). If soap and water are not available, use hand .  Know when and how to change your dressing.  Know when to remove your dressing.  Check your puncture site every day for signs of infection. Check for:  More redness, swelling, or pain.  More drainage of fluid or blood.  More warmth.  Pus or a bad smell.  General instructions  Rest as told by your health care provider.  Do not take baths, swim, or use a hot tub until your health care provider approves. Ask your health care provider if you may take showers. You may only be allowed to take sponge baths.  Take over-the-counter and prescription medicines only as told by your health care provider.  Return to your normal activities as told by your health care provider. Ask your health care provider what activities are safe for you.  If you have airplane travel scheduled, talk with your health care provider about when it is safe for you to travel by airplane. This is specific to certain biopsy procedures.  It is up to you to get the results of your procedure. Ask your health care provider, or the department that is doing the procedure, when your results will be ready.  Keep all follow-up  visits. You may need to make an appointment to get your biopsy results.  Contact a health care provider if:  You have a fever.  You have more redness, swelling, or pain at the puncture site that lasts longer than a few days.  You have more fluid or blood coming from your puncture site.  You have pus or a bad smell coming from your puncture site.  Your puncture site feels warm to the touch.  You have pain that does not get better with medicine.  Get help right away if:  You have severe bleeding from the puncture site.  You have chest pain.  You have problems breathing.  You cough up blood.  You faint.  You have a very fast heart rate.  These symptoms may be an emergency. Get help right away. Call 911.  Do not wait to see if the symptoms will go away.  Do not drive yourself to the hospital.  Summary  After the procedure, it is common to have soreness, bruising, tenderness, or mild pain at the biopsy site. These symptoms should go away in a few days.  Check your biopsy site every day for signs of infection, such as more redness, swelling, or pain.  Do not take baths, swim, or use a hot tub until your health care provider approves. Ask your health care provider if you may take showers.  Contact a jason care provider if you have more redness, swelling, or pain at the puncture site that lasts longer than a few days.  This information is not intended to replace advice given to you by your health care provider. Make sure you discuss any questions you have with your health care provider.  Document Revised: 12/14/2022 Document Reviewed: 12/14/2022  [employer] Patient Education © 2023 [employer]    HOME CARE INSTRUCTIONS    ACTIVITY: Rest and take it easy for the first 24 hours.  A responsible adult is recommended to remain with you during that time.  It is normal to feel sleepy.  We encourage you to not do anything that requires balance, judgment or coordination.    FOR 24 HOURS DO NOT:  Drive, operate machinery or run  household appliances.  Drink beer or alcoholic beverages.  Make important decisions or sign legal documents.    DIET: To avoid nausea, slowly advance diet as tolerated, avoiding spicy or greasy foods for the first day.  Add more substantial food to your diet according to your physician's instructions.  Babies can be fed formula or breast milk as soon as they are hungry.  INCREASE FLUIDS AND FIBER TO AVOID CONSTIPATION.    SURGICAL DRESSING/BATHING: Ok to remove dressing tomorrow and shower. Do not submerge in water (bath, tubs, hot tubs, swimming pools) for one week.     MEDICATIONS: Resume taking daily medication.  Take prescribed pain medication with food.  If no medication is prescribed, you may take non-aspirin pain medication if needed.  PAIN MEDICATION CAN BE VERY CONSTIPATING.  Take a stool softener or laxative such as senokot, pericolace, or milk of magnesia if needed.    You should CALL YOUR PHYSICIAN if you develop:  Fever greater than 101 degrees F.  Pain not relieved by medication, or persistent nausea or vomiting.  Excessive bleeding (blood soaking through dressing) or unexpected drainage from the wound.  Extreme redness or swelling around the incision site, drainage of pus or foul smelling drainage.  Inability to urinate or empty your bladder within 8 hours.  Problems with breathing or chest pain.    You should call 911 if you develop problems with breathing or chest pain.  If you are unable to contact your doctor or surgical center, you should go to the nearest emergency room or urgent care center.  Physician's telephone #: Dr. Talavera [phone]    MILD FLU-LIKE SYMPTOMS ARE NORMAL.  YOU MAY EXPERIENCE GENERALIZED MUSCLE ACHES, THROAT IRRITATION, HEADACHE AND/OR SOME NAUSEA.    If any questions arise, call your doctor.  If your doctor is not available, please feel free to call the Surgical Center at [phone].  The Center is open Monday through Friday from 7AM to 7PM.      A registered nurse  may call you a few days after your surgery to see how you are doing after your procedure.    You may also receive a survey in the mail within the next two weeks and we ask that you take a few moments to complete the survey and return it to us.  Our goal is to provide you with very good care and we value your comments.     Depression / Suicide Risk    As you are discharged from this [facility] facility, it is important to learn how to keep safe from harming yourself.    Recognize the warning signs:  Abrupt changes in personality, positive or negative- including increase in energy   Giving away possessions  Change in eating patterns- significant weight changes-  positive or negative  Change in sleeping patterns- unable to sleep or sleeping all the time   Unwillingness or inability to communicate  Depression  Unusual sadness, discouragement and loneliness  Talk of wanting to die  Neglect of personal appearance   Rebelliousness- reckless behavior  Withdrawal from people/activities they love  Confusion- inability to concentrate     If you or a loved one observes any of these behaviors or has concerns about self-harm, here's what you can do:  Talk about it- your feelings and reasons for harming yourself  Remove any means that you might use to hurt yourself (examples: pills, rope, extension cords, firearm)  Get professional help from the community (Mental Health, Substance Abuse, psychological counseling)  Do not be alone:Call your Safe Contact- someone whom you trust who will be there for you.  Call your local CRISIS HOTLINE 166-6030 or 648-515-2244  Call your local Children's Mobile Crisis Response Team [place] (440) 949-2300 or www.Jiahe  Call the toll free National Suicide Prevention Hotlines   National Suicide Prevention Lifeline 014-629-XEZQ (6990)  National Hope Line Network 800-SUICIDE (369-3618)    I acknowledge receipt and understanding of these Home Care instructions.

## 2024-01-24 NOTE — PROGRESS NOTES
Pt presents to CT 4. Pt. was consented by MD at bedside, confirmed by this RN and consent at bedside. Pt transferred to procedure table in supine position. Patient underwent a left lung biopsy by Dr. Paul. Procedure site was marked by MD and verified using imaging guidance. Pt placed on monitor, prepped and draped in a sterile fashion. Vitals were taken every 5 minutes and remained stable during procedure (see doc flow sheet for results). CO2 waveform capnography was monitored and remained WNL throughout procedure. Report called to DARREL Lopez. Pt transported by stretcher with RN to [facility] 4.     Specimen: 4 specimens in formalin and 2 specimens in RPMI hand delivered to lab.

## 2024-01-26 ENCOUNTER — TELEMEDICINE (OUTPATIENT)
Dept: MEDICAL GROUP | Facility: PHYSICIAN GROUP | Age: 64
End: 2024-01-26
Payer: COMMERCIAL

## 2024-01-26 ENCOUNTER — DOCUMENTATION (OUTPATIENT)
Dept: SLEEP MEDICINE | Facility: MEDICAL CENTER | Age: 64
End: 2024-01-26

## 2024-01-26 VITALS — WEIGHT: 143 LBS | BODY MASS INDEX: 22.98 KG/M2 | HEIGHT: 66 IN

## 2024-01-26 DIAGNOSIS — J98.59 MEDIASTINAL MASS: ICD-10-CM

## 2024-01-26 DIAGNOSIS — D49.89 THYMOMA: ICD-10-CM

## 2024-01-26 DIAGNOSIS — F41.1 GAD (GENERALIZED ANXIETY DISORDER): ICD-10-CM

## 2024-01-26 DIAGNOSIS — F51.01 PRIMARY INSOMNIA: ICD-10-CM

## 2024-01-26 PROCEDURE — 99214 OFFICE O/P EST MOD 30 MIN: CPT | Mod: 95 | Performed by: PHYSICIAN ASSISTANT

## 2024-01-26 ASSESSMENT — FIBROSIS 4 INDEX: FIB4 SCORE: 1.25

## 2024-01-26 NOTE — Clinical Note
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 I am referring her to [facility] to eval for resection. I was also going to refer her to you as my concern is her RUL GGO on CT. It is my understanding the metastasis from AB type thymoma is rare but wasn't sure if we should go straight for bronchoscopic biopsy or PET? I guess I just wanted some input since I rarely see thymoma and she is low risk for primary lung Ca/never smoker.  Jesusita

## 2024-01-26 NOTE — PROGRESS NOTES
Called patient to discuss pathology results of anterior mediastinal mass.  Biopsy showed thymoma AB type.  Definitive treatment for this is typically surgical resection which patient is aware of.  I placed an urgent referral to [facility].  We discussed that my concern and ongoing is groundglass right upper lobe opacity.  While AFB type a thymoma typically does not metastasize, the area is still concerning.  I will place referral to oncology and reach out to see if we should proceed with bronchoscopic biopsy of the right upper lobe nodule or obtain PET scan.

## 2024-01-26 NOTE — PROGRESS NOTES
Virtual Visit: Established Patient   This visit was conducted via Zoom using secure and encrypted videoconferencing technology.   The patient was in their home in the state of [place].    The patient's identity was confirmed and verbal consent was obtained for this virtual visit.     Subjective:   CC:   Chief Complaint   Patient presents with    Sleep Problem       Lizy Saleem is a 63 y.o. female, patient of Dr. Talavera, presenting for evaluation and management of:    Problem   Mediastinal Mass    Chronic, control unknown.  Biopys 1/24/2024.  Managed by pulmonology, [facility] [place].    PATHOLOGY 1/26/2024:  Core biopsy consistent with a thymoma, favoring AB type.      Primary Insomnia    Chronic, uncontrolled.  Patient tried trazodone 50 mg daily but it did not help.  Will try increased dose.  Follow up if symptoms worsens.     Patel (Generalized Anxiety Disorder)    Chronic, stable.  Patient is feeling good on a lower dose of venlafaxine, 75 mg.  Will let me know if she was to decrease to 37.5 mg.  Reports having a very good support system.           ROS   Denies N/V/D, fever    Current medicines (including changes today)  Current Outpatient Medications   Medication Sig Dispense Refill    B Complex Vitamins (B COMPLEX PO) Take  by mouth every day.      Ferrous Sulfate (IRON PO) Take  by mouth every day.      Ascorbic Acid (VITAMIN C) 1000 MG Tab Take 2,000 mg by mouth every day.      Omega-3 Fatty Acids (OMEGA 3 PO) Take  by mouth every day.      Multiple Minerals-Vitamins (CALCIUM CITRATE PLUS/MAGNESIUM PO) Take  by mouth.      Probiotic Product (PRO-BIOTIC BLEND PO) Take  by mouth every day.      venlafaxine XR (EFFEXOR XR) 75 MG CAPSULE SR 24 HR Take 1 Capsule by mouth every day. (Patient taking differently: Take 75 mg by mouth every day. Taking 1/2 a tab) 90 Capsule 0    levothyroxine (SYNTHROID) 75 MCG Tab Take 1 Tablet by mouth every day.      valacyclovir (VALTREX) 1 GM Tab TAKE 1 TABLET BY MOUTH  "EVERY DAY 90 Tablet 3    NON SPECIFIED C-E2/E3 25 mg/mlTake 0.1ml SL daily 5 Each 1    Non Formulary Request C-progesterone 60 mg/gm CR - apply 4 clicks topically daily 90 Each 3     No current facility-administered medications for this visit.       Patient Active Problem List    Diagnosis Date Noted    Mediastinal mass 01/26/2024    Primary insomnia 01/26/2024    Abnormal findings on diagnostic imaging of other specified body structures 01/08/2024    Unintentional weight loss 01/08/2024    Bacterial intestinal infection, unspecified 09/30/2023    Diarrhea 09/30/2023    Other and unspecified ovarian cyst 09/30/2023    Pain of toe of right foot 02/17/2023    Overweight with body mass index (BMI) 25.0-29.9 06/19/2022    Chronic pain of both knees 06/19/2022    Recurrent HSV (herpes simplex virus) 06/19/2022    Dense breasts 01/21/2022    Hormone replacement therapy (HRT) 01/21/2022    Arthritis 01/12/2021    First degree hemorrhoids 12/02/2020    Chronic diarrhea 11/18/2020    Chronic midline low back pain without sciatica 11/18/2020    Herpes labialis 02/08/2019    Family history of abdominal aortic aneurysm 02/08/2019    Evans's neuroma of left foot 11/08/2018    DERREK (generalized anxiety disorder) 11/01/2018    Acquired hypothyroidism 11/01/2018    Menopausal syndrome (hot flashes) 11/01/2018    Other sleep apnea 11/01/2018    Restless legs 11/01/2018    Family history of malignant neoplasm of breast 11/17/2017        Objective:   Ht 1.676 m (5' 6\")   Wt 64.9 kg (143 lb)   BMI 23.08 kg/m²     Physical Exam:  Constitutional: Alert, no distress, well-groomed.  Skin: No rashes in visible areas.  Eye: Round. Conjunctiva clear, lids normal. No icterus.   ENMT: Lips pink without lesions, good dentition, moist mucous membranes. Phonation normal.  Neck: No masses, no thyromegaly. Moves freely without pain.  Respiratory: Unlabored respiratory effort, no cough or audible wheeze  Psych: Alert and oriented x3, normal affect " and mood.     Assessment and Plan:   The following treatment plan was discussed:     1. Mediastinal mass  Chronic, uncontrolled.  Patient has follow-up with pulmonology to discuss biopsy results.    2. DERREK (generalized anxiety disorder)  Chronic, improved.  Continuing venlafaxine 75 mg for now.  Will let me know if she wants to decrease to 37.5 mg.    3. Primary insomnia  Chronic, uncontrolled.  Trazodone 50 mg did not help.  Discussed increasing dose up to 200 mg per night, max.          Follow-up: No follow-ups on file.

## 2024-01-29 DIAGNOSIS — D49.89 THYMOMA: ICD-10-CM

## 2024-01-29 DIAGNOSIS — R91.1 LUNG NODULE: ICD-10-CM

## 2024-01-30 ENCOUNTER — HOSPITAL ENCOUNTER (OUTPATIENT)
Dept: HEMATOLOGY ONCOLOGY | Facility: MEDICAL CENTER | Age: 64
End: 2024-01-30
Attending: STUDENT IN AN ORGANIZED HEALTH CARE EDUCATION/TRAINING PROGRAM
Payer: COMMERCIAL

## 2024-01-30 VITALS
SYSTOLIC BLOOD PRESSURE: 124 MMHG | OXYGEN SATURATION: 94 % | TEMPERATURE: 99.1 F | HEIGHT: 66 IN | WEIGHT: 147.6 LBS | BODY MASS INDEX: 23.72 KG/M2 | HEART RATE: 68 BPM | DIASTOLIC BLOOD PRESSURE: 68 MMHG

## 2024-01-30 DIAGNOSIS — J98.59 MEDIASTINAL MASS: ICD-10-CM

## 2024-01-30 DIAGNOSIS — D49.89 THYMOMA: ICD-10-CM

## 2024-01-30 PROCEDURE — 99212 OFFICE O/P EST SF 10 MIN: CPT | Performed by: STUDENT IN AN ORGANIZED HEALTH CARE EDUCATION/TRAINING PROGRAM

## 2024-01-30 PROCEDURE — 99205 OFFICE O/P NEW HI 60 MIN: CPT | Performed by: STUDENT IN AN ORGANIZED HEALTH CARE EDUCATION/TRAINING PROGRAM

## 2024-01-30 RX ORDER — LEVOTHYROXINE SODIUM 0.07 MG/1
75 TABLET ORAL
Qty: 90 TABLET | Refills: 0 | Status: SHIPPED | OUTPATIENT
Start: 2024-01-30

## 2024-01-30 ASSESSMENT — ENCOUNTER SYMPTOMS
SPUTUM PRODUCTION: 0
MEMORY LOSS: 0
NAUSEA: 0
SORE THROAT: 0
TINGLING: 0
FEVER: 0
COUGH: 0
HEARTBURN: 0
FOCAL WEAKNESS: 0
ABDOMINAL PAIN: 0
PALPITATIONS: 0
WHEEZING: 0
SENSORY CHANGE: 0
DEPRESSION: 0
CHILLS: 0
BRUISES/BLEEDS EASILY: 0
NECK PAIN: 0
BLURRED VISION: 0
DIZZINESS: 1
ORTHOPNEA: 0
WEIGHT LOSS: 0
TREMORS: 0
SHORTNESS OF BREATH: 0
VOMITING: 0
HEADACHES: 0

## 2024-01-30 ASSESSMENT — FIBROSIS 4 INDEX: FIB4 SCORE: 1.25

## 2024-01-30 NOTE — PROGRESS NOTES
Consult Note: Hematology/Oncology     Referring Provider: Sanjuana Talavera MD  Primary Care:  Kathy Talavera M.D.    Chief Complaint   Patient presents with    New Patient     Thymoma        Current Treatment: None    Prior Treatment: None    Subjective:   History of Presenting Illness:  Lizy Saleem is a 63 y.o. 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  She lost her mother to lung cancer  in May 2023.      She and her  got COVID19 2023.  At that time she noted that she lost her appetite.  She subsequently lost her  to COVID19 PNA in 11/2023.  She developed chronic diarrhea and loss of appetite around that time.  She has been losing weight.  This prompted her to see Dr. Lewis (GI) on December 28, 2023 for her diarrhea and loss of appetite who ordered a CT AP.  A mass in her mediastinum was seen by the radiologist.  There is no concern for malignancy in her abdomen and pelvis.  As such a CT Chest was subsequently ordered on December 29 which shows a 6.8 x 9.9 x 13 cm lobulated mass within the left anterior mediastinum.  Additionally there was a 1.2 cm ill-defined opacity in the right upper lobe.    She had a colonoscopy and EGD on 1/11/24.  Only findings was Barretts esophagitis, and she was placed on omeprazole.     She subsequently had a biopsy of the mediastinum on January 24 which confirms thymoma.    Today she presents with her best friend's daughter.  She is eager to have this mass removed    Past Medical History:   Diagnosis Date    Acquired hypothyroidism 11/01/2018    Anesthesia     PONV    Anxiety reaction 11/01/2018    Bowel habit changes     Bronchitis 2004    Lasted a month    Chronic diarrhea 11/18/2020    Chronic midline low back pain without sciatica 11/18/2020    Family history of abdominal aortic aneurysm 02/08/2019    Herpes labialis 02/08/2019    Evans's neuroma of left foot 11/08/2018    PONV  (postoperative nausea and vomiting) 2000 and 2003    For bunionectomies    Sleep apnea 2012    Did sleep study with dentist wear dental appliance        Past Surgical History:   Procedure Laterality Date    HYSTEROSCOPY WITH VIDEO DIAGNOSTIC  10/23/2015    Procedure: HYSTEROSCOPY WITH VIDEO DIAGNOSTIC;  Surgeon: Lesli Frias M.D.;  Location: SURGERY [facility];  Service:     DILATION AND CURETTAGE  10/23/2015    Procedure: DILATION AND CURETTAGE;  Surgeon: Lesli Frias M.D.;  Location: SURGERY [facility];  Service:     BUNIONECTOMY Right 01/01/2003    BUNIONECTOMY Left 01/01/2000    ARTHROSCOPY, KNEE Left 01/01/1992    OTHER  1991    Left knee meniscus repair       Social History     Tobacco Use    Smoking status: Never    Smokeless tobacco: Never   Vaping Use    Vaping Use: Never used   Substance Use Topics    Alcohol use: Not Currently    Drug use: No        Family History   Problem Relation Age of Onset    Breast Cancer Paternal Aunt     Cancer Father         Prostate    Hyperlipidemia Father     Hypertension Father     Other Father         multiple aneurisms    Thyroid Mother     Cancer Mother         Melanoma left eye and on leg    Lung Disease Mother     Cancer Cousin     Cancer Maternal Uncle         Stomach cancer    Cancer Paternal Aunt         Masectomy    Cancer Sister         Thyroid cancer       No Known Allergies    Current Outpatient Medications   Medication Sig Dispense Refill    B Complex Vitamins (B COMPLEX PO) Take  by mouth every day.      Ferrous Sulfate (IRON PO) Take  by mouth every day.      Ascorbic Acid (VITAMIN C) 1000 MG Tab Take 2,000 mg by mouth every day.      Omega-3 Fatty Acids (OMEGA 3 PO) Take  by mouth every day.      Multiple Minerals-Vitamins (CALCIUM CITRATE PLUS/MAGNESIUM PO) Take  by mouth.      Probiotic Product (PRO-BIOTIC BLEND PO) Take  by mouth every day.      venlafaxine XR (EFFEXOR XR) 75 MG CAPSULE SR 24 HR Take 1 Capsule by mouth every day.  "(Patient taking differently: Take 75 mg by mouth every day. Taking 1/2 a tab) 90 Capsule 0    levothyroxine (SYNTHROID) 75 MCG Tab Take 1 Tablet by mouth every day.      valacyclovir (VALTREX) 1 GM Tab TAKE 1 TABLET BY MOUTH EVERY DAY 90 Tablet 3    NON SPECIFIED C-E2/E3 25 mg/mlTake 0.1ml SL daily 5 Each 1    Non Formulary Request C-progesterone 60 mg/gm CR - apply 4 clicks topically daily 90 Each 3     No current facility-administered medications for this encounter.       Review of Systems   Constitutional:  Negative for chills, fever, malaise/fatigue and weight loss.   HENT:  Negative for congestion, ear pain, nosebleeds and sore throat.    Eyes:  Negative for blurred vision.   Respiratory:  Negative for cough, sputum production, shortness of breath and wheezing.    Cardiovascular:  Negative for chest pain, palpitations, orthopnea and leg swelling.   Gastrointestinal:  Negative for abdominal pain, heartburn, nausea and vomiting.   Genitourinary:  Negative for dysuria, frequency and urgency.   Musculoskeletal:  Negative for neck pain.   Neurological:  Positive for dizziness (on standing). Negative for tingling, tremors, sensory change, focal weakness and headaches.   Endo/Heme/Allergies:  Does not bruise/bleed easily.   Psychiatric/Behavioral:  Negative for depression, memory loss and suicidal ideas.    All other systems reviewed and are negative.      Problem list, medications, and allergies reviewed by myself today in Epic.     Objective:     Vitals:    01/30/24 0858   BP: 124/68   BP Location: Left arm   Patient Position: Sitting   BP Cuff Size: Adult   Pulse: 68   Temp: 37.3 °C (99.1 °F)   TempSrc: Temporal   SpO2: 94%   Weight: 67 kg (147 lb 9.6 oz)   Height: 1.676 m (5' 5.98\")       DESC; KARNOFSKY SCALE WITH ECOG EQUIVALENT: 90, Able to carry on normal activity; minor signs or symptoms of disease (ECOG equivalent 0)    DISTRESS LEVEL: no acute distress    Physical Exam  Constitutional:       General: She is " not in acute distress.     Appearance: Normal appearance. She is not ill-appearing.   HENT:      Head: Normocephalic and atraumatic.      Nose: Nose normal.      Mouth/Throat:      Mouth: Mucous membranes are moist.      Pharynx: No oropharyngeal exudate or posterior oropharyngeal erythema.   Eyes:      General: No scleral icterus.     Conjunctiva/sclera: Conjunctivae normal.      Pupils: Pupils are equal, round, and reactive to light.   Cardiovascular:      Rate and Rhythm: Normal rate and regular rhythm.      Pulses: Normal pulses.      Heart sounds: Normal heart sounds. No murmur heard.     No friction rub. No gallop.   Pulmonary:      Effort: Pulmonary effort is normal. No respiratory distress.      Breath sounds: Normal breath sounds. No stridor. No wheezing, rhonchi or rales.   Chest:      Chest wall: No tenderness.   Abdominal:      General: Abdomen is flat. Bowel sounds are normal. There is no distension.      Palpations: Abdomen is soft. There is no mass.      Tenderness: There is no abdominal tenderness. There is no guarding.   Musculoskeletal:         General: No swelling, tenderness or deformity. Normal range of motion.      Cervical back: Normal range of motion and neck supple. No rigidity or tenderness.      Right lower leg: No edema.      Left lower leg: No edema.   Skin:     General: Skin is warm and dry.      Coloration: Skin is not jaundiced or pale.      Findings: No bruising, erythema or rash.   Neurological:      General: No focal deficit present.      Mental Status: She is alert and oriented to person, place, and time. Mental status is at baseline.      Sensory: No sensory deficit.      Motor: No weakness.      Coordination: Coordination normal.      Gait: Gait normal.   Psychiatric:         Mood and Affect: Mood normal.         Behavior: Behavior normal.         Thought Content: Thought content normal.         Judgment: Judgment normal.         Labs:   Most recent labs reviewed.     CBC CMP  TSH reviewed without concerns    Imaging:   Most recent images below have been independently reviewed by me.      CT CAP    1.  6.8 x 9.9 x 13.0 cm lobulated mass within the left anterior mediastinum. Differential considerations include lymphoma, thymoma, teratoma.     2.  1.2 cm ill-defined opacity right upper lobe indeterminate.     3.  No pleural effusions.    Pathology:  FINAL DIAGNOSIS:     A. Lung/mediastinal biopsy:          Core biopsy consistent with a thymoma, favoring AB type.     Assessment/Plan:      Cancer Staging   Thymoma  Staging form: Thymus, AJCC 8th Edition  - Clinical stage from 1/30/2024: Stage I (cT1a, cN0, cM0) - Signed by Fabby Holm M.D. on 1/30/2024     Ms. Reeves is a 63-year-old female with a recent diagnosis of thymoma.    Together we reviewed the results of her pathology report.  We discussed the natural history of thymoma.  We also reviewed her imaging and I showed her a large lobulated mass within her anterior mediastinum that measured up to 13 cm.  We discussed that staging of a thymoma consists of invasion rather than in size.  I would like to review with the radiologist if there is any invasion present.     We next discussed plan of care.  Ideally she would have a resection of this mass.  I would like to present her in tumor board to see if this is feasible.  I did discuss with her the possibility of neoadjuvant chemotherapy to shrink the mass although I am not suggesting this and will defer to the surgeon to see if it is possible to resect as it is.    We then moved on to discuss the small 1.2 cm opacity in the right upper lobe.  I would like a PET scan to be done to determine if this is hypermetabolic.  If it is we will likely do a bronchoscopy with a biopsy.  Patient is a non-smoker and lung cancer is unlikely however of course quite possible.  I also explained to her that metastases from thymoma is rare but can occur.     I will see patient on Thursday after tumor  board to discuss the plan moving forward.    1. Mediastinal mass  - REFERRAL TO ONCOLOGY NURSE NAVIGATOR  - MR-BRAIN-WITH & W/O; Future  - Referral to Hematology Oncology to [place] for 2/2 opinion        No follow-ups on file.     Any questions and concerns raised by the patient were addressed and answered. Patient denies any further questions.  Patient encouraged to call the office with any concerns or issues.     Fabby Holm M.D.  Hematology/Oncology    60 minutes was spent on this visit

## 2024-01-31 ENCOUNTER — HOSPITAL ENCOUNTER (OUTPATIENT)
Dept: RADIOLOGY | Facility: MEDICAL CENTER | Age: 64
End: 2024-01-31
Attending: INTERNAL MEDICINE
Payer: COMMERCIAL

## 2024-01-31 DIAGNOSIS — D49.89 THYMOMA: ICD-10-CM

## 2024-01-31 DIAGNOSIS — R91.1 LUNG NODULE: ICD-10-CM

## 2024-01-31 PROCEDURE — A9552 F18 FDG: HCPCS

## 2024-01-31 NOTE — ADDENDUM NOTE
Encounter addended by: Lara Helton, Med Ass't on: 1/31/2024 7:45 AM   Actions taken: Charge Capture section accepted

## 2024-02-01 ENCOUNTER — HOSPITAL ENCOUNTER (OUTPATIENT)
Dept: HEMATOLOGY ONCOLOGY | Facility: MEDICAL CENTER | Age: 64
End: 2024-02-01
Attending: STUDENT IN AN ORGANIZED HEALTH CARE EDUCATION/TRAINING PROGRAM
Payer: COMMERCIAL

## 2024-02-01 ENCOUNTER — HOSPITAL ENCOUNTER (OUTPATIENT)
Dept: LAB | Facility: MEDICAL CENTER | Age: 64
End: 2024-02-01
Attending: OBSTETRICS & GYNECOLOGY
Payer: COMMERCIAL

## 2024-02-01 DIAGNOSIS — J98.59 MEDIASTINAL MASS: ICD-10-CM

## 2024-02-01 DIAGNOSIS — D49.89 THYMOMA: ICD-10-CM

## 2024-02-01 DIAGNOSIS — Z79.899 ENCOUNTER FOR LONG-TERM (CURRENT) USE OF HIGH-RISK MEDICATION: ICD-10-CM

## 2024-02-01 LAB
25(OH)D3 SERPL-MCNC: 98 NG/ML (ref 30–100)
ALBUMIN SERPL BCP-MCNC: 4 G/DL (ref 3.2–4.9)
ALBUMIN/GLOB SERPL: 1.4 G/DL
ALP SERPL-CCNC: 58 U/L (ref 30–99)
ALT SERPL-CCNC: 14 U/L (ref 2–50)
ANION GAP SERPL CALC-SCNC: 12 MMOL/L (ref 7–16)
AST SERPL-CCNC: 18 U/L (ref 12–45)
BASOPHILS # BLD AUTO: 1 % (ref 0–1.8)
BASOPHILS # BLD: 0.05 K/UL (ref 0–0.12)
BILIRUB SERPL-MCNC: 0.3 MG/DL (ref 0.1–1.5)
BUN SERPL-MCNC: 21 MG/DL (ref 8–22)
CALCIUM ALBUM COR SERPL-MCNC: 9.1 MG/DL (ref 8.5–10.5)
CALCIUM SERPL-MCNC: 9.1 MG/DL (ref 8.5–10.5)
CHLORIDE SERPL-SCNC: 104 MMOL/L (ref 96–112)
CHOLEST SERPL-MCNC: 185 MG/DL (ref 100–199)
CO2 SERPL-SCNC: 22 MMOL/L (ref 20–33)
CORTIS SERPL-MCNC: 11.3 UG/DL (ref 0–23)
CREAT SERPL-MCNC: 0.66 MG/DL (ref 0.5–1.4)
CRP SERPL HS-MCNC: 1.3 MG/L (ref 0–3)
DHEA-S SERPL-MCNC: 27.2 UG/DL (ref 18.9–205)
EOSINOPHIL # BLD AUTO: 0.1 K/UL (ref 0–0.51)
EOSINOPHIL NFR BLD: 1.9 % (ref 0–6.9)
ERYTHROCYTE [DISTWIDTH] IN BLOOD BY AUTOMATED COUNT: 55.4 FL (ref 35.9–50)
EST. AVERAGE GLUCOSE BLD GHB EST-MCNC: 123 MG/DL
ESTRADIOL SERPL-MCNC: 6.1 PG/ML
FERRITIN SERPL-MCNC: 11.1 NG/ML (ref 10–291)
FIBRINOGEN PPP-MCNC: 286 MG/DL (ref 215–460)
FOLATE SERPL-MCNC: 24.9 NG/ML
FSH SERPL-ACNC: 77.3 MIU/ML
GFR SERPLBLD CREATININE-BSD FMLA CKD-EPI: 99 ML/MIN/1.73 M 2
GLOBULIN SER CALC-MCNC: 2.9 G/DL (ref 1.9–3.5)
GLUCOSE SERPL-MCNC: 93 MG/DL (ref 65–99)
HBA1C MFR BLD: 5.9 % (ref 4–5.6)
HCT VFR BLD AUTO: 41.3 % (ref 37–47)
HCYS SERPL-SCNC: 7.27 UMOL/L
HDLC SERPL-MCNC: 87 MG/DL
HGB BLD-MCNC: 13.6 G/DL (ref 12–16)
IMM GRANULOCYTES # BLD AUTO: 0.01 K/UL (ref 0–0.11)
IMM GRANULOCYTES NFR BLD AUTO: 0.2 % (ref 0–0.9)
IRON SATN MFR SERPL: 18 % (ref 15–55)
IRON SERPL-MCNC: 76 UG/DL (ref 40–170)
LDLC SERPL CALC-MCNC: 90 MG/DL
LH SERPL-ACNC: 41.8 IU/L
LYMPHOCYTES # BLD AUTO: 1.29 K/UL (ref 1–4.8)
LYMPHOCYTES NFR BLD: 24.9 % (ref 22–41)
MAGNESIUM SERPL-MCNC: 2.1 MG/DL (ref 1.5–2.5)
MCH RBC QN AUTO: 28.8 PG (ref 27–33)
MCHC RBC AUTO-ENTMCNC: 32.9 G/DL (ref 32.2–35.5)
MCV RBC AUTO: 87.5 FL (ref 81.4–97.8)
MONOCYTES # BLD AUTO: 0.22 K/UL (ref 0–0.85)
MONOCYTES NFR BLD AUTO: 4.2 % (ref 0–13.4)
NEUTROPHILS # BLD AUTO: 3.52 K/UL (ref 1.82–7.42)
NEUTROPHILS NFR BLD: 67.8 % (ref 44–72)
NRBC # BLD AUTO: 0 K/UL
NRBC BLD-RTO: 0 /100 WBC (ref 0–0.2)
PLATELET # BLD AUTO: 223 K/UL (ref 164–446)
PMV BLD AUTO: 13.8 FL (ref 9–12.9)
POTASSIUM SERPL-SCNC: 4.3 MMOL/L (ref 3.6–5.5)
PROGEST SERPL-MCNC: 1.58 NG/ML
PROLACTIN SERPL-MCNC: 10.3 NG/ML (ref 2.8–26)
PROT SERPL-MCNC: 6.9 G/DL (ref 6–8.2)
RBC # BLD AUTO: 4.72 M/UL (ref 4.2–5.4)
SODIUM SERPL-SCNC: 138 MMOL/L (ref 135–145)
T3 SERPL-MCNC: 56.6 NG/DL (ref 60–181)
T3FREE SERPL-MCNC: 1.59 PG/ML (ref 2–4.4)
T4 FREE SERPL-MCNC: 1.25 NG/DL (ref 0.93–1.7)
T4 SERPL-MCNC: 7 UG/DL (ref 4–12)
THYROPEROXIDASE AB SERPL-ACNC: <9 IU/ML (ref 0–9)
TIBC SERPL-MCNC: 416 UG/DL (ref 250–450)
TRIGL SERPL-MCNC: 41 MG/DL (ref 0–149)
TSH SERPL DL<=0.005 MIU/L-ACNC: 0.61 UIU/ML (ref 0.38–5.33)
UIBC SERPL-MCNC: 340 UG/DL (ref 110–370)
VIT B12 SERPL-MCNC: 2345 PG/ML (ref 211–911)
WBC # BLD AUTO: 5.2 K/UL (ref 4.8–10.8)

## 2024-02-01 PROCEDURE — 83525 ASSAY OF INSULIN: CPT

## 2024-02-01 PROCEDURE — 83550 IRON BINDING TEST: CPT

## 2024-02-01 PROCEDURE — 83036 HEMOGLOBIN GLYCOSYLATED A1C: CPT

## 2024-02-01 PROCEDURE — 83001 ASSAY OF GONADOTROPIN (FSH): CPT

## 2024-02-01 PROCEDURE — 81291 MTHFR GENE: CPT

## 2024-02-01 PROCEDURE — 84403 ASSAY OF TOTAL TESTOSTERONE: CPT

## 2024-02-01 PROCEDURE — 84402 ASSAY OF FREE TESTOSTERONE: CPT

## 2024-02-01 PROCEDURE — 83002 ASSAY OF GONADOTROPIN (LH): CPT

## 2024-02-01 PROCEDURE — 84479 ASSAY OF THYROID (T3 OR T4): CPT

## 2024-02-01 PROCEDURE — 83695 ASSAY OF LIPOPROTEIN(A): CPT

## 2024-02-01 PROCEDURE — 36415 COLL VENOUS BLD VENIPUNCTURE: CPT

## 2024-02-01 PROCEDURE — 86141 C-REACTIVE PROTEIN HS: CPT

## 2024-02-01 PROCEDURE — 82627 DEHYDROEPIANDROSTERONE: CPT

## 2024-02-01 PROCEDURE — 82670 ASSAY OF TOTAL ESTRADIOL: CPT

## 2024-02-01 PROCEDURE — 84439 ASSAY OF FREE THYROXINE: CPT

## 2024-02-01 PROCEDURE — 82642 DIHYDROTESTOSTERONE: CPT

## 2024-02-01 PROCEDURE — 85025 COMPLETE CBC W/AUTO DIFF WBC: CPT

## 2024-02-01 PROCEDURE — 84144 ASSAY OF PROGESTERONE: CPT

## 2024-02-01 PROCEDURE — 84146 ASSAY OF PROLACTIN: CPT

## 2024-02-01 PROCEDURE — 83088 ASSAY OF HISTAMINE: CPT

## 2024-02-01 PROCEDURE — 83090 ASSAY OF HOMOCYSTEINE: CPT

## 2024-02-01 PROCEDURE — 82607 VITAMIN B-12: CPT

## 2024-02-01 PROCEDURE — 84482 T3 REVERSE: CPT

## 2024-02-01 PROCEDURE — 82728 ASSAY OF FERRITIN: CPT

## 2024-02-01 PROCEDURE — 86376 MICROSOMAL ANTIBODY EACH: CPT

## 2024-02-01 PROCEDURE — 82306 VITAMIN D 25 HYDROXY: CPT

## 2024-02-01 PROCEDURE — 82533 TOTAL CORTISOL: CPT

## 2024-02-01 PROCEDURE — 82172 ASSAY OF APOLIPOPROTEIN: CPT

## 2024-02-01 PROCEDURE — 80061 LIPID PANEL: CPT

## 2024-02-01 PROCEDURE — 99214 OFFICE O/P EST MOD 30 MIN: CPT | Mod: 95 | Performed by: STUDENT IN AN ORGANIZED HEALTH CARE EDUCATION/TRAINING PROGRAM

## 2024-02-01 PROCEDURE — 83735 ASSAY OF MAGNESIUM: CPT

## 2024-02-01 PROCEDURE — 85384 FIBRINOGEN ACTIVITY: CPT

## 2024-02-01 PROCEDURE — 84270 ASSAY OF SEX HORMONE GLOBUL: CPT

## 2024-02-01 PROCEDURE — 84481 FREE ASSAY (FT-3): CPT

## 2024-02-01 PROCEDURE — 84480 ASSAY TRIIODOTHYRONINE (T3): CPT

## 2024-02-01 PROCEDURE — 84443 ASSAY THYROID STIM HORMONE: CPT

## 2024-02-01 PROCEDURE — 80053 COMPREHEN METABOLIC PANEL: CPT

## 2024-02-01 PROCEDURE — 81401 MOPATH PROCEDURE LEVEL 2: CPT

## 2024-02-01 PROCEDURE — 83540 ASSAY OF IRON: CPT

## 2024-02-01 PROCEDURE — 82746 ASSAY OF FOLIC ACID SERUM: CPT

## 2024-02-01 ASSESSMENT — ENCOUNTER SYMPTOMS
TINGLING: 0
DEPRESSION: 0
WHEEZING: 0
MEMORY LOSS: 0
TREMORS: 0
HEADACHES: 0
ABDOMINAL PAIN: 0
BRUISES/BLEEDS EASILY: 0
NECK PAIN: 0
FOCAL WEAKNESS: 0
SPUTUM PRODUCTION: 0
PALPITATIONS: 0
FEVER: 0
SENSORY CHANGE: 0
VOMITING: 0
SORE THROAT: 0
HEARTBURN: 0
CHILLS: 0
SHORTNESS OF BREATH: 0
NAUSEA: 0
BLURRED VISION: 0
ORTHOPNEA: 0
COUGH: 0
WEIGHT LOSS: 0
DIZZINESS: 1

## 2024-02-01 NOTE — RESULT ENCOUNTER NOTE
SUV is not very high in the RUL abnormality but I do think we can get to it bronchoscopically if we don't want to just watch it. I cannot make it to tumor board tomorrow unfortunately but could easily be persuaded to monitor for 3 mons or proceed with bronch if it affects ultimate decision to proceed with surgery or not.   Jesusita

## 2024-02-01 NOTE — PROGRESS NOTES
Consult Note: Hematology/Oncology     Referring Provider: Sanjuana Talavera MD  Primary Care:  Kathy Talavera M.D.    Chief Complaint   Patient presents with    Follow-Up     Thymoma        Current Treatment: None    Prior Treatment: None    This evaluation was conducted via Zoom using secure and encrypted videoconferencing technology. The patient was in their home in the [facility].    The patient's identity was confirmed and verbal consent was obtained for this virtual visit.    Subjective:   History of Presenting Illness:  Lizy Saleem is a 63 y.o. 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  She lost her mother to lung cancer  in May 2023.      She and her  got COVID19 2023.  At that time she noted that she lost her appetite.  She subsequently lost her  to COVID19 PNA in 11/2023.  She developed chronic diarrhea and loss of appetite around that time.  She has been losing weight.  This prompted her to see Dr. Lewis (GI) on December 28, 2023 for her diarrhea and loss of appetite who ordered a CT AP.  A mass in her mediastinum was seen by the radiologist.  There is no concern for malignancy in her abdomen and pelvis.  As such a CT Chest was subsequently ordered on December 29 which shows a 6.8 x 9.9 x 13 cm lobulated mass within the left anterior mediastinum.  Additionally there was a 1.2 cm ill-defined opacity in the right upper lobe.    She had a colonoscopy and EGD on 1/11/24.  Only findings was Barretts esophagitis, and she was placed on omeprazole.     She subsequently had a biopsy of the mediastinum on January 24 which confirms thymoma.    Interval history  Patient's case was presented in tumor board this morning.  He reports that she is doing well.  She does still have dizziness and lightheadedness when she bends over      Past Medical History:   Diagnosis Date    Acquired hypothyroidism 11/01/2018    Anesthesia      PONV    Anxiety reaction 11/01/2018    Bowel habit changes     Bronchitis 2004    Lasted a month    Chronic diarrhea 11/18/2020    Chronic midline low back pain without sciatica 11/18/2020    Family history of abdominal aortic aneurysm 02/08/2019    Herpes labialis 02/08/2019    Evans's neuroma of left foot 11/08/2018    PONV (postoperative nausea and vomiting) 2000 and 2003    For bunionectomies    Sleep apnea 2012    Did sleep study with dentist wear dental appliance        Past Surgical History:   Procedure Laterality Date    HYSTEROSCOPY WITH VIDEO DIAGNOSTIC  10/23/2015    Procedure: HYSTEROSCOPY WITH VIDEO DIAGNOSTIC;  Surgeon: Lesli Frias M.D.;  Location: SURGERY [facility];  Service:     DILATION AND CURETTAGE  10/23/2015    Procedure: DILATION AND CURETTAGE;  Surgeon: Lesli Frias M.D.;  Location: SURGERY [facility];  Service:     BUNIONECTOMY Right 01/01/2003    BUNIONECTOMY Left 01/01/2000    ARTHROSCOPY, KNEE Left 01/01/1992    OTHER  1991    Left knee meniscus repair       Social History     Tobacco Use    Smoking status: Never    Smokeless tobacco: Never   Vaping Use    Vaping Use: Never used   Substance Use Topics    Alcohol use: Not Currently    Drug use: No        Family History   Problem Relation Age of Onset    Breast Cancer Paternal Aunt     Cancer Father         Prostate    Hyperlipidemia Father     Hypertension Father     Other Father         multiple aneurisms    Thyroid Mother     Cancer Mother         Melanoma left eye and on leg    Lung Disease Mother     Cancer Cousin     Cancer Maternal Uncle         Stomach cancer    Cancer Paternal Aunt         Masectomy    Cancer Sister         Thyroid cancer       No Known Allergies    Current Outpatient Medications   Medication Sig Dispense Refill    levothyroxine (SYNTHROID) 75 MCG Tab TAKE 1 TABLET BY MOUTH EVERY DAY IN THE MORNING ON AN EMPTY STOMACH 90 Tablet 0    B Complex Vitamins (B COMPLEX PO) Take  by mouth every  day.      Ferrous Sulfate (IRON PO) Take  by mouth every day.      Ascorbic Acid (VITAMIN C) 1000 MG Tab Take 2,000 mg by mouth every day.      Omega-3 Fatty Acids (OMEGA 3 PO) Take  by mouth every day.      Multiple Minerals-Vitamins (CALCIUM CITRATE PLUS/MAGNESIUM PO) Take  by mouth.      Probiotic Product (PRO-BIOTIC BLEND PO) Take  by mouth every day.      venlafaxine XR (EFFEXOR XR) 75 MG CAPSULE SR 24 HR Take 1 Capsule by mouth every day. (Patient taking differently: Take 75 mg by mouth every day. Taking 1/2 a tab) 90 Capsule 0    valacyclovir (VALTREX) 1 GM Tab TAKE 1 TABLET BY MOUTH EVERY DAY 90 Tablet 3    NON SPECIFIED C-E2/E3 25 mg/mlTake 0.1ml SL daily 5 Each 1    Non Formulary Request C-progesterone 60 mg/gm CR - apply 4 clicks topically daily 90 Each 3     No current facility-administered medications for this encounter.       Review of Systems   Constitutional:  Negative for chills, fever, malaise/fatigue and weight loss.   HENT:  Negative for congestion, ear pain, nosebleeds and sore throat.    Eyes:  Negative for blurred vision.   Respiratory:  Negative for cough, sputum production, shortness of breath and wheezing.    Cardiovascular:  Negative for chest pain, palpitations, orthopnea and leg swelling.   Gastrointestinal:  Negative for abdominal pain, heartburn, nausea and vomiting.   Genitourinary:  Negative for dysuria, frequency and urgency.   Musculoskeletal:  Negative for neck pain.   Neurological:  Positive for dizziness (on standing). Negative for tingling, tremors, sensory change, focal weakness and headaches.   Endo/Heme/Allergies:  Does not bruise/bleed easily.   Psychiatric/Behavioral:  Negative for depression, memory loss and suicidal ideas.    All other systems reviewed and are negative.      Problem list, medications, and allergies reviewed by myself today in Epic.     Objective:     There were no vitals filed for this visit.      DESC; KARNOFSKY SCALE WITH ECOG EQUIVALENT: 90, Able to  carry on normal activity; minor signs or symptoms of disease (ECOG equivalent 0)    DISTRESS LEVEL: no acute distress    Physical Exam  Not done due to virtual visit    Labs:   Most recent labs reviewed.     CBC CMP TSH reviewed without concerns    Imaging:   Most recent images below have been independently reviewed by me.      CT CAP    1.  6.8 x 9.9 x 13.0 cm lobulated mass within the left anterior mediastinum. Differential considerations include lymphoma, thymoma, teratoma.     2.  1.2 cm ill-defined opacity right upper lobe indeterminate.     3.  No pleural effusions.    Pathology:  FINAL DIAGNOSIS:     A. Lung/mediastinal biopsy:          Core biopsy consistent with a thymoma, favoring AB type.     Assessment/Plan:      Cancer Staging   Thymoma  Staging form: Thymus, AJCC 8th Edition  - Clinical stage from 1/30/2024: Stage I (cT1a, cN0, cM0) - Signed by Fabby Holm M.D. on 1/30/2024     Ms. Reeves is a 63-year-old female with a recent diagnosis of thymoma.    Today I discussed the results of the tumor board discussion with her.    Recommendations include surgical removal of her thymoma as well as wedge resection of the 1.2 cm mass in her right lung.  She was very happy with this news.    Would like to see her in 6 weeks to go over the pathology of her right upper lobe mass.  We can do a virtual visit at that time.      Plan   Fu with thoracic surgery for removal of her thymoma and right upper lobe mass  Will see patient in 6 weeks to go over the pathology of her right upper lobe mass        Any questions and concerns raised by the patient were addressed and answered. Patient denies any further questions.  Patient encouraged to call the office with any concerns or issues.     Fabby Holm M.D.  Hematology/Oncology    26 minutes was spent on this visit

## 2024-02-03 LAB
APO B100 SERPL-MCNC: 84 MG/DL (ref 60–117)
INSULIN P FAST SERPL-ACNC: 3 UIU/ML (ref 3–25)
LPA SERPL-MCNC: 34 MG/DL
T UPTAKE NL11712: 1.1 TBI (ref 0.8–1.3)

## 2024-02-05 ENCOUNTER — PATIENT MESSAGE (OUTPATIENT)
Dept: MEDICAL GROUP | Facility: PHYSICIAN GROUP | Age: 64
End: 2024-02-05
Payer: COMMERCIAL

## 2024-02-05 LAB
ANDROSTANOLONE SERPL-MCNC: 20.3 PG/ML (ref 24–208)
HISTAMINE BLD-SCNC: 1062 NMOL/L (ref 180–1800)
MTHFR C.1298A>C GENO BLD/T: NORMAL
MTHFR C.677C>T GENO BLD/T: NEGATIVE
MTHFR GENE MUT ANL BLD/T: NORMAL
TEST NAME 95000: NORMAL

## 2024-02-06 ENCOUNTER — HOSPITAL ENCOUNTER (OUTPATIENT)
Dept: RADIOLOGY | Facility: MEDICAL CENTER | Age: 64
End: 2024-02-06
Attending: STUDENT IN AN ORGANIZED HEALTH CARE EDUCATION/TRAINING PROGRAM
Payer: COMMERCIAL

## 2024-02-06 ENCOUNTER — PATIENT OUTREACH (OUTPATIENT)
Dept: ONCOLOGY | Facility: MEDICAL CENTER | Age: 64
End: 2024-02-06

## 2024-02-06 DIAGNOSIS — J98.59 MEDIASTINAL MASS: ICD-10-CM

## 2024-02-06 LAB
SHBG SERPL-SCNC: 73 NMOL/L (ref 17–125)
TESTOST FREE SERPL-MCNC: 1.8 PG/ML (ref 0.6–3.8)
TESTOST SERPL-MCNC: 18 NG/DL (ref 5–32)

## 2024-02-06 PROCEDURE — 70553 MRI BRAIN STEM W/O & W/DYE: CPT

## 2024-02-06 PROCEDURE — A9579 GAD-BASE MR CONTRAST NOS,1ML: HCPCS | Performed by: STUDENT IN AN ORGANIZED HEALTH CARE EDUCATION/TRAINING PROGRAM

## 2024-02-06 PROCEDURE — 700117 HCHG RX CONTRAST REV CODE 255: Performed by: STUDENT IN AN ORGANIZED HEALTH CARE EDUCATION/TRAINING PROGRAM

## 2024-02-06 RX ORDER — VENLAFAXINE HYDROCHLORIDE 37.5 MG/1
37.5 CAPSULE, EXTENDED RELEASE ORAL DAILY
COMMUNITY
End: 2024-02-06

## 2024-02-06 RX ORDER — VENLAFAXINE HYDROCHLORIDE 37.5 MG/1
37.5 CAPSULE, EXTENDED RELEASE ORAL DAILY
Qty: 90 CAPSULE | Refills: 1 | Status: CANCELLED | OUTPATIENT
Start: 2024-02-06

## 2024-02-06 RX ORDER — VENLAFAXINE HYDROCHLORIDE 37.5 MG/1
37.5 CAPSULE, EXTENDED RELEASE ORAL DAILY
Qty: 90 CAPSULE | Refills: 1 | Status: SHIPPED | OUTPATIENT
Start: 2024-02-06 | End: 2024-02-06 | Stop reason: SDUPTHER

## 2024-02-06 RX ADMIN — GADOTERIDOL 14 ML: 279.3 INJECTION, SOLUTION INTRAVENOUS at 14:37

## 2024-02-06 NOTE — PROGRESS NOTES
Cancer Nurse Navigation Assessment:      Assessment/Discussion: Call placed to patient, left message    TRANSPORTATION:   FINANCIAL:    SUPPORT SYSTEM:    PSYCHOLOGICAL:       DST: 2 previously administered    Resources Provided/Intervention: Introduction letter, cancer support service calendar and message sent via [employer]

## 2024-02-06 NOTE — RESULT ENCOUNTER NOTE
Hi! I presented her in TB and the decision was to remove this RUL nodule when they remove the thymoma.  Thanks!

## 2024-02-07 RX ORDER — VENLAFAXINE HYDROCHLORIDE 37.5 MG/1
37.5 CAPSULE, EXTENDED RELEASE ORAL DAILY
Qty: 90 CAPSULE | Refills: 1 | Status: SHIPPED | OUTPATIENT
Start: 2024-02-07

## 2024-02-08 ENCOUNTER — TELEPHONE (OUTPATIENT)
Dept: HEMATOLOGY ONCOLOGY | Facility: MEDICAL CENTER | Age: 64
End: 2024-02-08
Payer: COMMERCIAL

## 2024-02-08 DIAGNOSIS — D18.00 CAVERNOMA: ICD-10-CM

## 2024-02-08 NOTE — TELEPHONE ENCOUNTER
Called patient to discuss the results of her MRI brain.  Will place a referral to neurosurgery for monitoring and discussing need for intervention.  Patient voiced understanding and encouraged her to call me in the future if needed.    Fabby Holm MD  Hematology/Oncology

## 2024-02-11 LAB — T3REVERSE SERPL-MCNC: 15.6 NG/DL (ref 9–27)

## 2024-02-13 ENCOUNTER — TELEPHONE (OUTPATIENT)
Dept: MEDICAL GROUP | Facility: PHYSICIAN GROUP | Age: 64
End: 2024-02-13
Payer: COMMERCIAL

## 2024-02-16 ENCOUNTER — HOSPITAL ENCOUNTER (OUTPATIENT)
Dept: LAB | Facility: MEDICAL CENTER | Age: 64
End: 2024-02-16
Attending: NURSE PRACTITIONER
Payer: COMMERCIAL

## 2024-02-16 PROCEDURE — 86041 ACETYLCHOLN RCPTR BNDNG ANTB: CPT

## 2024-02-16 PROCEDURE — 86255 FLUORESCENT ANTIBODY SCREEN: CPT

## 2024-02-16 PROCEDURE — 36415 COLL VENOUS BLD VENIPUNCTURE: CPT

## 2024-02-16 PROCEDURE — 86256 FLUORESCENT ANTIBODY TITER: CPT

## 2024-02-16 PROCEDURE — 86042 ACETYLCHOLN RCPTR BLCKG ANTB: CPT

## 2024-02-16 PROCEDURE — 86366 MUSCLE-SPECIFIC KINASE ANTB: CPT

## 2024-02-17 ENCOUNTER — PATIENT MESSAGE (OUTPATIENT)
Dept: HEMATOLOGY ONCOLOGY | Facility: MEDICAL CENTER | Age: 64
End: 2024-02-17
Payer: COMMERCIAL

## 2024-02-17 DIAGNOSIS — D49.89 THYMOMA: ICD-10-CM

## 2024-02-20 LAB
ACHR BIND AB SER-SCNC: 0 NMOL/L (ref 0–0.4)
ACHR BLOCK AB/ACHR TOTAL SFR SER: 0 % (ref 0–26)

## 2024-02-21 ENCOUNTER — TELEPHONE (OUTPATIENT)
Dept: HEMATOLOGY ONCOLOGY | Facility: MEDICAL CENTER | Age: 64
End: 2024-02-21

## 2024-02-21 ENCOUNTER — HOSPITAL ENCOUNTER (OUTPATIENT)
Dept: CARDIOLOGY | Facility: MEDICAL CENTER | Age: 64
End: 2024-02-21
Attending: NURSE PRACTITIONER
Payer: COMMERCIAL

## 2024-02-21 DIAGNOSIS — D49.89 THYMOMA: ICD-10-CM

## 2024-02-21 LAB
LV EJECT FRACT MOD 2C 99903: 70.53
LV EJECT FRACT MOD 4C 99902: 70.71
LV EJECT FRACT MOD BP 99901: 70.27
STRIA MUS IGG SER QL IF: DETECTED
STRIATED MUSC IGG TITER Q4408: ABNORMAL

## 2024-02-21 PROCEDURE — 93306 TTE W/DOPPLER COMPLETE: CPT | Mod: 26 | Performed by: STUDENT IN AN ORGANIZED HEALTH CARE EDUCATION/TRAINING PROGRAM

## 2024-02-21 PROCEDURE — 93306 TTE W/DOPPLER COMPLETE: CPT

## 2024-02-21 NOTE — TELEPHONE ENCOUNTER
Dr. Chasity Hernandez from [facility] call asking for a call back regarding patient, her number is #[phone].

## 2024-02-22 LAB — TEST NAME 95000: NORMAL

## 2024-02-24 ENCOUNTER — HOSPITAL ENCOUNTER (OUTPATIENT)
Dept: PULMONOLOGY | Facility: MEDICAL CENTER | Age: 64
End: 2024-02-24
Payer: COMMERCIAL

## 2024-02-24 PROCEDURE — 94726 PLETHYSMOGRAPHY LUNG VOLUMES: CPT

## 2024-02-24 PROCEDURE — 94729 DIFFUSING CAPACITY: CPT

## 2024-02-24 PROCEDURE — 94060 EVALUATION OF WHEEZING: CPT

## 2024-02-24 RX ADMIN — Medication 2.5 MG: at 07:47

## 2024-02-24 ASSESSMENT — PULMONARY FUNCTION TESTS
FVC: 2.93
FEV1_PERCENT_CHANGE: 3
FEV1/FVC_PERCENT_PREDICTED: 102
FEV1/FVC_PERCENT_PREDICTED: 103
FEV1: 2.28
FEV1/FVC: 81
FEV1_PREDICTED: 2.61
FEV1/FVC_PERCENT_CHANGE: 6
FVC_PREDICTED: 3.33
FEV1/FVC_PERCENT_LLN: 66
FEV1/FVC: 81.23
FEV1/FVC_PERCENT_PREDICTED: 78
FEV1_LLN: 2.18
FVC_PERCENT_PREDICTED: 90
FEV1_LLN: 2.18
FEV1/FVC_PERCENT_PREDICTED: 97
FVC: 3
FEV1/FVC_PERCENT_LLN: 66
FEV1/FVC_PERCENT_PREDICTED: 96
FVC_LLN: 2.78
FVC_PERCENT_PREDICTED: 88
FEV1_PERCENT_CHANGE: -1
FVC_LLN: 2.78
FEV1/FVC: 76
FEV1/FVC_PERCENT_CHANGE: -300
FEV1/FVC: 76
FEV1_PERCENT_PREDICTED: 87
FEV1: 2.38
FEV1_PERCENT_PREDICTED: 91
FEV1/FVC_PREDICTED: 79

## 2024-02-25 PROCEDURE — 94060 EVALUATION OF WHEEZING: CPT | Mod: 26 | Performed by: INTERNAL MEDICINE

## 2024-02-25 PROCEDURE — 94729 DIFFUSING CAPACITY: CPT | Mod: 26 | Performed by: INTERNAL MEDICINE

## 2024-02-25 PROCEDURE — 94726 PLETHYSMOGRAPHY LUNG VOLUMES: CPT | Mod: 26 | Performed by: INTERNAL MEDICINE

## 2024-02-25 NOTE — PROCEDURES
DATE OF SERVICE:  02/24/2024     PULMONARY FUNCTION TEST INTERPRETATION      Baseline spirometry is normal with a negative bronchodilator response.     Flow volume loops are consistent with spirometric data.     The full lung volumes demonstrate mild air trapping.     The DLCO is elevated.     SUMMARY:  Findings of mild air trapping in addition to an elevated DLCO   consistent with a possible diagnosis of asthma.  Correlate clinically.  If   asthma is suspected clinically, consider methacholine challenge test.        ______________________________  BENJY TREADWELL, DO GUERRERO/JONATAN/GERA    DD:  02/25/2024 07:33  DT:  02/25/2024 07:59    Job#:  [id number]

## 2024-03-05 ENCOUNTER — HOSPITAL ENCOUNTER (OUTPATIENT)
Dept: RADIOLOGY | Facility: MEDICAL CENTER | Age: 64
End: 2024-03-05
Attending: OBSTETRICS & GYNECOLOGY
Payer: COMMERCIAL

## 2024-03-05 DIAGNOSIS — F32.A DEPRESSIVE TYPE PSYCHOSIS: ICD-10-CM

## 2024-03-05 DIAGNOSIS — R19.7 DIARRHEA OF PRESUMED INFECTIOUS ORIGIN: ICD-10-CM

## 2024-03-05 DIAGNOSIS — N95.1 SYMPTOMATIC MENOPAUSAL OR FEMALE CLIMACTERIC STATES: ICD-10-CM

## 2024-03-05 DIAGNOSIS — G47.00 PERSISTENT DISORDER OF INITIATING OR MAINTAINING SLEEP: ICD-10-CM

## 2024-03-05 DIAGNOSIS — E55.9 AVITAMINOSIS D: ICD-10-CM

## 2024-03-05 PROCEDURE — 77080 DXA BONE DENSITY AXIAL: CPT

## 2024-03-06 ENCOUNTER — APPOINTMENT (OUTPATIENT)
Dept: ADMISSIONS | Facility: MEDICAL CENTER | Age: 64
DRG: 165 | End: 2024-03-06
Attending: SURGERY
Payer: COMMERCIAL

## 2024-03-07 ENCOUNTER — PRE-ADMISSION TESTING (OUTPATIENT)
Dept: ADMISSIONS | Facility: MEDICAL CENTER | Age: 64
End: 2024-03-07
Payer: COMMERCIAL

## 2024-03-07 RX ORDER — OMEPRAZOLE 20 MG/1
20 CAPSULE, DELAYED RELEASE ORAL EVERY MORNING
COMMUNITY
Start: 2024-01-22

## 2024-03-07 RX ORDER — PYRIDOXINE HCL (VITAMIN B6) 100 MG
1 TABLET ORAL EVERY MORNING
COMMUNITY

## 2024-03-07 NOTE — OR NURSING
During telephone pre admit appointment pt states she would not object to a blood transfusion if needed. Pt was bloodless in the past. Blood consent printed and attached to testing paperwork for testing appointment for 3/8/24. Will have patient sign consent at testing appointment and scan into media.

## 2024-03-07 NOTE — PREPROCEDURE INSTRUCTIONS
Pt instructed to leave medications at home during hospital admission. Pt insists on bringing compounded medications and knows they will be sent to pharmacy to be verified.

## 2024-03-08 ENCOUNTER — APPOINTMENT (OUTPATIENT)
Dept: ADMISSIONS | Facility: MEDICAL CENTER | Age: 64
DRG: 165 | End: 2024-03-08
Attending: SURGERY
Payer: COMMERCIAL

## 2024-03-08 DIAGNOSIS — Z01.810 PRE-OPERATIVE CARDIOVASCULAR EXAMINATION: ICD-10-CM

## 2024-03-08 LAB — EKG IMPRESSION: NORMAL

## 2024-03-08 PROCEDURE — 93010 ELECTROCARDIOGRAM REPORT: CPT | Performed by: INTERNAL MEDICINE

## 2024-03-08 PROCEDURE — 93005 ELECTROCARDIOGRAM TRACING: CPT

## 2024-03-11 ENCOUNTER — HOSPITAL ENCOUNTER (INPATIENT)
Facility: MEDICAL CENTER | Age: 64
LOS: 3 days | DRG: 165 | End: 2024-03-14
Attending: SURGERY | Admitting: SURGERY
Payer: COMMERCIAL

## 2024-03-11 ENCOUNTER — ANESTHESIA (OUTPATIENT)
Dept: SURGERY | Facility: MEDICAL CENTER | Age: 64
DRG: 165 | End: 2024-03-11
Payer: COMMERCIAL

## 2024-03-11 ENCOUNTER — APPOINTMENT (OUTPATIENT)
Dept: CARDIOLOGY | Facility: MEDICAL CENTER | Age: 64
DRG: 165 | End: 2024-03-11
Attending: ANESTHESIOLOGY
Payer: COMMERCIAL

## 2024-03-11 ENCOUNTER — ANESTHESIA EVENT (OUTPATIENT)
Dept: SURGERY | Facility: MEDICAL CENTER | Age: 64
DRG: 165 | End: 2024-03-11
Payer: COMMERCIAL

## 2024-03-11 ENCOUNTER — APPOINTMENT (OUTPATIENT)
Dept: RADIOLOGY | Facility: MEDICAL CENTER | Age: 64
DRG: 165 | End: 2024-03-11
Attending: ANESTHESIOLOGY
Payer: COMMERCIAL

## 2024-03-11 DIAGNOSIS — D49.89 THYMOMA: ICD-10-CM

## 2024-03-11 LAB
ABO + RH BLD: NORMAL
ABO GROUP BLD: NORMAL
BLD GP AB SCN SERPL QL: NORMAL
LV EJECT FRACT  99904: 55
PATHOLOGY CONSULT NOTE: NORMAL
RH BLD: NORMAL

## 2024-03-11 PROCEDURE — 110371 HCHG SHELL REV 272: Performed by: SURGERY

## 2024-03-11 PROCEDURE — 160042 HCHG SURGERY MINUTES - EA ADDL 1 MIN LEVEL 5: Performed by: SURGERY

## 2024-03-11 PROCEDURE — 93325 DOPPLER ECHO COLOR FLOW MAPG: CPT

## 2024-03-11 PROCEDURE — 64421 NJX AA&/STRD NTRCOST NRV EA: CPT | Mod: RT | Performed by: SURGERY

## 2024-03-11 PROCEDURE — 86850 RBC ANTIBODY SCREEN: CPT

## 2024-03-11 PROCEDURE — C1751 CATH, INF, PER/CENT/MIDLINE: HCPCS | Performed by: SURGERY

## 2024-03-11 PROCEDURE — 700111 HCHG RX REV CODE 636 W/ 250 OVERRIDE (IP): Mod: JZ | Performed by: ANESTHESIOLOGY

## 2024-03-11 PROCEDURE — 700102 HCHG RX REV CODE 250 W/ 637 OVERRIDE(OP): Performed by: ANESTHESIOLOGY

## 2024-03-11 PROCEDURE — 160035 HCHG PACU - 1ST 60 MINS PHASE I: Performed by: SURGERY

## 2024-03-11 PROCEDURE — 160031 HCHG SURGERY MINUTES - 1ST 30 MINS LEVEL 5: Performed by: SURGERY

## 2024-03-11 PROCEDURE — 700111 HCHG RX REV CODE 636 W/ 250 OVERRIDE (IP): Mod: JZ | Performed by: SURGERY

## 2024-03-11 PROCEDURE — 88342 IMHCHEM/IMCYTCHM 1ST ANTB: CPT

## 2024-03-11 PROCEDURE — 770001 HCHG ROOM/CARE - MED/SURG/GYN PRIV*

## 2024-03-11 PROCEDURE — 86900 BLOOD TYPING SEROLOGIC ABO: CPT

## 2024-03-11 PROCEDURE — A9270 NON-COVERED ITEM OR SERVICE: HCPCS | Performed by: ANESTHESIOLOGY

## 2024-03-11 PROCEDURE — 07BM0ZZ EXCISION OF THYMUS, OPEN APPROACH: ICD-10-PCS | Performed by: ANESTHESIOLOGY

## 2024-03-11 PROCEDURE — 88341 IMHCHEM/IMCYTCHM EA ADD ANTB: CPT | Mod: 91

## 2024-03-11 PROCEDURE — 160009 HCHG ANES TIME/MIN: Performed by: SURGERY

## 2024-03-11 PROCEDURE — 160002 HCHG RECOVERY MINUTES (STAT): Performed by: SURGERY

## 2024-03-11 PROCEDURE — 36415 COLL VENOUS BLD VENIPUNCTURE: CPT

## 2024-03-11 PROCEDURE — 86901 BLOOD TYPING SEROLOGIC RH(D): CPT

## 2024-03-11 PROCEDURE — 700101 HCHG RX REV CODE 250: Performed by: ANESTHESIOLOGY

## 2024-03-11 PROCEDURE — 88360 TUMOR IMMUNOHISTOCHEM/MANUAL: CPT

## 2024-03-11 PROCEDURE — 160048 HCHG OR STATISTICAL LEVEL 1-5: Performed by: SURGERY

## 2024-03-11 PROCEDURE — 71045 X-RAY EXAM CHEST 1 VIEW: CPT

## 2024-03-11 PROCEDURE — 700105 HCHG RX REV CODE 258: Performed by: SURGERY

## 2024-03-11 PROCEDURE — 88307 TISSUE EXAM BY PATHOLOGIST: CPT | Mod: 59

## 2024-03-11 PROCEDURE — 160036 HCHG PACU - EA ADDL 30 MINS PHASE I: Performed by: SURGERY

## 2024-03-11 PROCEDURE — 0BBC0ZZ EXCISION OF RIGHT UPPER LUNG LOBE, OPEN APPROACH: ICD-10-PCS | Performed by: ANESTHESIOLOGY

## 2024-03-11 PROCEDURE — 700111 HCHG RX REV CODE 636 W/ 250 OVERRIDE (IP): Performed by: ANESTHESIOLOGY

## 2024-03-11 RX ORDER — MEPERIDINE HYDROCHLORIDE 25 MG/ML
6.25 INJECTION INTRAMUSCULAR; INTRAVENOUS; SUBCUTANEOUS
Status: DISCONTINUED | OUTPATIENT
Start: 2024-03-11 | End: 2024-03-11 | Stop reason: HOSPADM

## 2024-03-11 RX ORDER — BUPIVACAINE HYDROCHLORIDE 2.5 MG/ML
INJECTION, SOLUTION EPIDURAL; INFILTRATION; INTRACAUDAL PRN
Status: DISCONTINUED | OUTPATIENT
Start: 2024-03-11 | End: 2024-03-11 | Stop reason: SURG

## 2024-03-11 RX ORDER — ENOXAPARIN SODIUM 100 MG/ML
40 INJECTION SUBCUTANEOUS DAILY
Status: DISCONTINUED | OUTPATIENT
Start: 2024-03-12 | End: 2024-03-14 | Stop reason: HOSPADM

## 2024-03-11 RX ORDER — DIPHENHYDRAMINE HYDROCHLORIDE 50 MG/ML
25 INJECTION INTRAMUSCULAR; INTRAVENOUS EVERY 6 HOURS PRN
Status: DISCONTINUED | OUTPATIENT
Start: 2024-03-11 | End: 2024-03-14 | Stop reason: HOSPADM

## 2024-03-11 RX ORDER — PROCHLORPERAZINE EDISYLATE 5 MG/ML
10 INJECTION INTRAMUSCULAR; INTRAVENOUS EVERY 6 HOURS PRN
Status: DISCONTINUED | OUTPATIENT
Start: 2024-03-11 | End: 2024-03-14 | Stop reason: HOSPADM

## 2024-03-11 RX ORDER — SODIUM CHLORIDE, SODIUM LACTATE, POTASSIUM CHLORIDE, CALCIUM CHLORIDE 600; 310; 30; 20 MG/100ML; MG/100ML; MG/100ML; MG/100ML
INJECTION, SOLUTION INTRAVENOUS CONTINUOUS
Status: DISCONTINUED | OUTPATIENT
Start: 2024-03-11 | End: 2024-03-11

## 2024-03-11 RX ORDER — LABETALOL HYDROCHLORIDE 5 MG/ML
5 INJECTION, SOLUTION INTRAVENOUS
Status: DISCONTINUED | OUTPATIENT
Start: 2024-03-11 | End: 2024-03-11 | Stop reason: HOSPADM

## 2024-03-11 RX ORDER — HALOPERIDOL 5 MG/ML
1 INJECTION INTRAMUSCULAR EVERY 6 HOURS PRN
Status: DISCONTINUED | OUTPATIENT
Start: 2024-03-11 | End: 2024-03-14 | Stop reason: HOSPADM

## 2024-03-11 RX ORDER — HYDROMORPHONE HYDROCHLORIDE 1 MG/ML
0.5 INJECTION, SOLUTION INTRAMUSCULAR; INTRAVENOUS; SUBCUTANEOUS
Status: DISCONTINUED | OUTPATIENT
Start: 2024-03-11 | End: 2024-03-14 | Stop reason: HOSPADM

## 2024-03-11 RX ORDER — HALOPERIDOL 5 MG/ML
1 INJECTION INTRAMUSCULAR
Status: DISCONTINUED | OUTPATIENT
Start: 2024-03-11 | End: 2024-03-11 | Stop reason: HOSPADM

## 2024-03-11 RX ORDER — ACETAMINOPHEN 500 MG
1000 TABLET ORAL EVERY 6 HOURS PRN
Status: DISCONTINUED | OUTPATIENT
Start: 2024-03-16 | End: 2024-03-14 | Stop reason: HOSPADM

## 2024-03-11 RX ORDER — DEXAMETHASONE SODIUM PHOSPHATE 4 MG/ML
INJECTION, SOLUTION INTRA-ARTICULAR; INTRALESIONAL; INTRAMUSCULAR; INTRAVENOUS; SOFT TISSUE PRN
Status: DISCONTINUED | OUTPATIENT
Start: 2024-03-11 | End: 2024-03-11 | Stop reason: SURG

## 2024-03-11 RX ORDER — VENLAFAXINE HYDROCHLORIDE 37.5 MG/1
37.5 CAPSULE, EXTENDED RELEASE ORAL
Status: DISCONTINUED | OUTPATIENT
Start: 2024-03-12 | End: 2024-03-14 | Stop reason: HOSPADM

## 2024-03-11 RX ORDER — OMEPRAZOLE 20 MG/1
20 CAPSULE, DELAYED RELEASE ORAL EVERY MORNING
Status: DISCONTINUED | OUTPATIENT
Start: 2024-03-11 | End: 2024-03-14 | Stop reason: HOSPADM

## 2024-03-11 RX ORDER — OXYCODONE HCL 5 MG/5 ML
5 SOLUTION, ORAL ORAL
Status: COMPLETED | OUTPATIENT
Start: 2024-03-11 | End: 2024-03-11

## 2024-03-11 RX ORDER — SODIUM CHLORIDE, SODIUM LACTATE, POTASSIUM CHLORIDE, CALCIUM CHLORIDE 600; 310; 30; 20 MG/100ML; MG/100ML; MG/100ML; MG/100ML
INJECTION, SOLUTION INTRAVENOUS CONTINUOUS
Status: ACTIVE | OUTPATIENT
Start: 2024-03-11 | End: 2024-03-11

## 2024-03-11 RX ORDER — IBUPROFEN 200 MG
800 TABLET ORAL 3 TIMES DAILY PRN
Status: DISCONTINUED | OUTPATIENT
Start: 2024-03-14 | End: 2024-03-14 | Stop reason: HOSPADM

## 2024-03-11 RX ORDER — METHADONE HYDROCHLORIDE 10 MG/ML
INJECTION, SOLUTION INTRAMUSCULAR; INTRAVENOUS; SUBCUTANEOUS PRN
Status: DISCONTINUED | OUTPATIENT
Start: 2024-03-11 | End: 2024-03-11 | Stop reason: SURG

## 2024-03-11 RX ORDER — DEXAMETHASONE SODIUM PHOSPHATE 4 MG/ML
4 INJECTION, SOLUTION INTRA-ARTICULAR; INTRALESIONAL; INTRAMUSCULAR; INTRAVENOUS; SOFT TISSUE
Status: COMPLETED | OUTPATIENT
Start: 2024-03-11 | End: 2024-03-11

## 2024-03-11 RX ORDER — HYDROMORPHONE HYDROCHLORIDE 1 MG/ML
0.4 INJECTION, SOLUTION INTRAMUSCULAR; INTRAVENOUS; SUBCUTANEOUS
Status: DISCONTINUED | OUTPATIENT
Start: 2024-03-11 | End: 2024-03-11 | Stop reason: HOSPADM

## 2024-03-11 RX ORDER — ONDANSETRON 2 MG/ML
INJECTION INTRAMUSCULAR; INTRAVENOUS PRN
Status: DISCONTINUED | OUTPATIENT
Start: 2024-03-11 | End: 2024-03-11 | Stop reason: SURG

## 2024-03-11 RX ORDER — OXYCODONE HYDROCHLORIDE 5 MG/1
5 TABLET ORAL
Status: DISCONTINUED | OUTPATIENT
Start: 2024-03-11 | End: 2024-03-14 | Stop reason: HOSPADM

## 2024-03-11 RX ORDER — HYDRALAZINE HYDROCHLORIDE 20 MG/ML
5 INJECTION INTRAMUSCULAR; INTRAVENOUS
Status: DISCONTINUED | OUTPATIENT
Start: 2024-03-11 | End: 2024-03-11 | Stop reason: HOSPADM

## 2024-03-11 RX ORDER — KETOROLAC TROMETHAMINE 15 MG/ML
15 INJECTION, SOLUTION INTRAMUSCULAR; INTRAVENOUS EVERY 6 HOURS
Status: COMPLETED | OUTPATIENT
Start: 2024-03-11 | End: 2024-03-14

## 2024-03-11 RX ORDER — EPHEDRINE SULFATE 50 MG/ML
INJECTION, SOLUTION INTRAVENOUS PRN
Status: DISCONTINUED | OUTPATIENT
Start: 2024-03-11 | End: 2024-03-11 | Stop reason: SURG

## 2024-03-11 RX ORDER — ACETAMINOPHEN 500 MG
1000 TABLET ORAL EVERY 6 HOURS
Status: DISCONTINUED | OUTPATIENT
Start: 2024-03-11 | End: 2024-03-14 | Stop reason: HOSPADM

## 2024-03-11 RX ORDER — OXYCODONE HCL 5 MG/5 ML
10 SOLUTION, ORAL ORAL
Status: COMPLETED | OUTPATIENT
Start: 2024-03-11 | End: 2024-03-11

## 2024-03-11 RX ORDER — VALACYCLOVIR HYDROCHLORIDE 500 MG/1
1000 TABLET, FILM COATED ORAL EVERY MORNING
Qty: 14 TABLET | Refills: 0 | Status: DISCONTINUED | OUTPATIENT
Start: 2024-03-12 | End: 2024-03-14 | Stop reason: HOSPADM

## 2024-03-11 RX ORDER — DIPHENHYDRAMINE HYDROCHLORIDE 50 MG/ML
12.5 INJECTION INTRAMUSCULAR; INTRAVENOUS
Status: DISCONTINUED | OUTPATIENT
Start: 2024-03-11 | End: 2024-03-11 | Stop reason: HOSPADM

## 2024-03-11 RX ORDER — SCOLOPAMINE TRANSDERMAL SYSTEM 1 MG/1
1 PATCH, EXTENDED RELEASE TRANSDERMAL
Status: DISCONTINUED | OUTPATIENT
Start: 2024-03-11 | End: 2024-03-14 | Stop reason: HOSPADM

## 2024-03-11 RX ORDER — VENLAFAXINE HYDROCHLORIDE 37.5 MG/1
37.5 CAPSULE, EXTENDED RELEASE ORAL DAILY
Status: DISCONTINUED | OUTPATIENT
Start: 2024-03-12 | End: 2024-03-11

## 2024-03-11 RX ORDER — LEVOTHYROXINE SODIUM 0.07 MG/1
75 TABLET ORAL
Status: DISCONTINUED | OUTPATIENT
Start: 2024-03-12 | End: 2024-03-14 | Stop reason: HOSPADM

## 2024-03-11 RX ORDER — ONDANSETRON 2 MG/ML
4 INJECTION INTRAMUSCULAR; INTRAVENOUS
Status: DISCONTINUED | OUTPATIENT
Start: 2024-03-11 | End: 2024-03-11 | Stop reason: HOSPADM

## 2024-03-11 RX ORDER — CEFAZOLIN SODIUM 1 G/3ML
INJECTION, POWDER, FOR SOLUTION INTRAMUSCULAR; INTRAVENOUS PRN
Status: DISCONTINUED | OUTPATIENT
Start: 2024-03-11 | End: 2024-03-11 | Stop reason: SURG

## 2024-03-11 RX ORDER — MIDAZOLAM HYDROCHLORIDE 1 MG/ML
INJECTION INTRAMUSCULAR; INTRAVENOUS PRN
Status: DISCONTINUED | OUTPATIENT
Start: 2024-03-11 | End: 2024-03-11 | Stop reason: SURG

## 2024-03-11 RX ORDER — OXYCODONE HYDROCHLORIDE 5 MG/1
10 TABLET ORAL
Status: DISCONTINUED | OUTPATIENT
Start: 2024-03-11 | End: 2024-03-14 | Stop reason: HOSPADM

## 2024-03-11 RX ORDER — HYDROMORPHONE HYDROCHLORIDE 1 MG/ML
0.1 INJECTION, SOLUTION INTRAMUSCULAR; INTRAVENOUS; SUBCUTANEOUS
Status: DISCONTINUED | OUTPATIENT
Start: 2024-03-11 | End: 2024-03-11 | Stop reason: HOSPADM

## 2024-03-11 RX ORDER — HYDROMORPHONE HYDROCHLORIDE 1 MG/ML
0.2 INJECTION, SOLUTION INTRAMUSCULAR; INTRAVENOUS; SUBCUTANEOUS
Status: DISCONTINUED | OUTPATIENT
Start: 2024-03-11 | End: 2024-03-11 | Stop reason: HOSPADM

## 2024-03-11 RX ORDER — ONDANSETRON 2 MG/ML
4 INJECTION INTRAMUSCULAR; INTRAVENOUS EVERY 4 HOURS PRN
Status: DISCONTINUED | OUTPATIENT
Start: 2024-03-11 | End: 2024-03-14 | Stop reason: HOSPADM

## 2024-03-11 RX ADMIN — SODIUM CHLORIDE, POTASSIUM CHLORIDE, SODIUM LACTATE AND CALCIUM CHLORIDE: 600; 310; 30; 20 INJECTION, SOLUTION INTRAVENOUS at 06:25

## 2024-03-11 RX ADMIN — MIDAZOLAM HYDROCHLORIDE 2 MG: 1 INJECTION, SOLUTION INTRAMUSCULAR; INTRAVENOUS at 07:34

## 2024-03-11 RX ADMIN — FENTANYL CITRATE 50 MCG: 50 INJECTION, SOLUTION INTRAMUSCULAR; INTRAVENOUS at 10:55

## 2024-03-11 RX ADMIN — Medication 25 MG: at 08:22

## 2024-03-11 RX ADMIN — PROPOFOL 50 MG: 10 INJECTION, EMULSION INTRAVENOUS at 09:22

## 2024-03-11 RX ADMIN — EPHEDRINE SULFATE 30 MG: 50 INJECTION, SOLUTION INTRAVENOUS at 08:30

## 2024-03-11 RX ADMIN — SODIUM CHLORIDE, POTASSIUM CHLORIDE, SODIUM LACTATE AND CALCIUM CHLORIDE: 600; 310; 30; 20 INJECTION, SOLUTION INTRAVENOUS at 14:23

## 2024-03-11 RX ADMIN — DEXAMETHASONE SODIUM PHOSPHATE 4 MG: 4 INJECTION INTRA-ARTICULAR; INTRALESIONAL; INTRAMUSCULAR; INTRAVENOUS; SOFT TISSUE at 16:08

## 2024-03-11 RX ADMIN — PROPOFOL 50 MG: 10 INJECTION, EMULSION INTRAVENOUS at 07:46

## 2024-03-11 RX ADMIN — PROPOFOL 150 MG: 10 INJECTION, EMULSION INTRAVENOUS at 07:37

## 2024-03-11 RX ADMIN — METHADONE HYDROCHLORIDE 10 MG: 10 INJECTION, SOLUTION INTRAMUSCULAR; INTRAVENOUS; SUBCUTANEOUS at 07:43

## 2024-03-11 RX ADMIN — BUPIVACAINE HYDROCHLORIDE 30 ML: 2.5 INJECTION, SOLUTION EPIDURAL; INFILTRATION; INTRACAUDAL at 09:31

## 2024-03-11 RX ADMIN — KETOROLAC TROMETHAMINE 15 MG: 15 INJECTION, SOLUTION INTRAMUSCULAR; INTRAVENOUS at 23:37

## 2024-03-11 RX ADMIN — DEXAMETHASONE SODIUM PHOSPHATE 8 MG: 4 INJECTION INTRA-ARTICULAR; INTRALESIONAL; INTRAMUSCULAR; INTRAVENOUS; SOFT TISSUE at 08:15

## 2024-03-11 RX ADMIN — GLYCOPYRROLATE 0.3 MG: 0.2 INJECTION INTRAMUSCULAR; INTRAVENOUS at 09:13

## 2024-03-11 RX ADMIN — ONDANSETRON 4 MG: 2 INJECTION INTRAMUSCULAR; INTRAVENOUS at 08:50

## 2024-03-11 RX ADMIN — KETOROLAC TROMETHAMINE 15 MG: 15 INJECTION, SOLUTION INTRAMUSCULAR; INTRAVENOUS at 14:08

## 2024-03-11 RX ADMIN — CEFAZOLIN 2 G: 1 INJECTION, POWDER, FOR SOLUTION INTRAMUSCULAR; INTRAVENOUS at 07:53

## 2024-03-11 RX ADMIN — ROCURONIUM BROMIDE 20 MG: 10 INJECTION, SOLUTION INTRAVENOUS at 08:33

## 2024-03-11 RX ADMIN — SUGAMMADEX 200 MG: 100 INJECTION, SOLUTION INTRAVENOUS at 09:34

## 2024-03-11 RX ADMIN — FENTANYL CITRATE 50 MCG: 50 INJECTION, SOLUTION INTRAMUSCULAR; INTRAVENOUS at 10:06

## 2024-03-11 RX ADMIN — OXYCODONE HYDROCHLORIDE 10 MG: 5 SOLUTION ORAL at 10:04

## 2024-03-11 RX ADMIN — PROPOFOL 100 MG: 10 INJECTION, EMULSION INTRAVENOUS at 08:05

## 2024-03-11 RX ADMIN — ROCURONIUM BROMIDE 50 MG: 10 INJECTION, SOLUTION INTRAVENOUS at 07:37

## 2024-03-11 RX ADMIN — FENTANYL CITRATE 50 MCG: 50 INJECTION, SOLUTION INTRAMUSCULAR; INTRAVENOUS at 09:25

## 2024-03-11 RX ADMIN — ONDANSETRON 4 MG: 2 INJECTION INTRAMUSCULAR; INTRAVENOUS at 13:07

## 2024-03-11 RX ADMIN — KETOROLAC TROMETHAMINE 15 MG: 15 INJECTION, SOLUTION INTRAMUSCULAR; INTRAVENOUS at 18:00

## 2024-03-11 RX ADMIN — PROCHLORPERAZINE EDISYLATE 10 MG: 5 INJECTION INTRAMUSCULAR; INTRAVENOUS at 18:20

## 2024-03-11 ASSESSMENT — PAIN DESCRIPTION - PAIN TYPE
TYPE: SURGICAL PAIN
TYPE: SURGICAL PAIN
TYPE: ACUTE PAIN;SURGICAL PAIN
TYPE: SURGICAL PAIN

## 2024-03-11 ASSESSMENT — FIBROSIS 4 INDEX: FIB4 SCORE: 1.34

## 2024-03-11 NOTE — ANESTHESIA PROCEDURE NOTES
Central Venous Line    Performed by: Yong Anguiano M.D.  Authorized by: Yong Anguiano M.D.    Start Time:  3/11/2024 7:46 AM  End Time:  3/11/2024 7:51 AM  Patient Location:  OR  Indication: central venous access and hemodynamic monitoring        provider hand hygiene performed prior to central venous catheter insertion, all 5 sterile barriers used (gloves, gown, cap, mask, large sterile drape) during central venous catheter insertion and skin prep agent completely dried prior to procedure    Patient Position:  Trendelenburg  Laterality:  Right  Site:  Internal jugular  Prep:  Chlorhexidine  Catheter Size:  7 Fr  Catheter Length (cm):  20  Number of Lumens:  Triple lumen  target vein identified, needle advanced into vein and blood aspirated and guidewire advanced into vein    Seldinger Technique?: Yes    Ultrasound-Guided: ultrasound-guided  Image captured, interpreted and electronically stored.  Sterile Gel and Probe Cover Used for Ultrasound?: Yes    Intravenous Verification: verified by ultrasound, venous blood return and chest x-ray pending    all ports aspirated, all ports flushed easily, guidewire was removed intact, biopatch was applied, line was sutured in place and dressing was applied    Events: patient tolerated procedure well with no complications    PA Catheter Placed?: No

## 2024-03-11 NOTE — ANESTHESIA PROCEDURE NOTES
Airway    Date/Time: 3/11/2024 7:38 AM    Performed by: Yong Anguiano M.D.  Authorized by: Yong Anguiano M.D.    Location:  OR  Urgency:  Elective  Difficult Airway: No    Indications for Airway Management:  Anesthesia      Spontaneous Ventilation: absent    Sedation Level:  Deep  Preoxygenated: Yes    Patient Position:  Sniffing  Mask Difficulty Assessment:  2 - vent by mask + OA or adjuvant +/- NMBA  Final Airway Type:  Endotracheal airway  Final Endotracheal Airway:  ETT - double lumen left with ONE LUNG VENTILATION  Cuffed: Yes    Technique Used for Successful ETT Placement:  Direct laryngoscopy    Insertion Site:  Oral  Blade Type:  Hemant  Laryngoscope Blade/Videolaryngoscope Blade Size:  3  ETT Double Lumen (fr):  37  Measured from:  Teeth  ETT to Teeth (cm):  29  Placement Verified by: auscultation and capnometry    Cormack-Lehane Classification:  Grade I - full view of glottis  Number of Attempts at Approach:  1

## 2024-03-11 NOTE — OP REPORT
DATE OF SERVICE:  03/11/2024     PREOPERATIVE DIAGNOSES:  1.  Thymoma.  2.  Right upper lobe lung mass.     POSTOPERATIVE DIAGNOSES:  1.  Thymoma.  2.  Right upper lobe lung mass.     PROCEDURES:  Median sternotomy with excision of thymoma, wedge resection right   upper lobe lung nodule.     SURGEON:  John H. Ganser, MD     CO-SURGEON:  Moises Wells MD     ANESTHESIA:  General.     ANESTHESIOLOGIST:  Yong Anguiano MD     INDICATIONS:  The patient is a 63-year-old female who had an abdominal CT scan   for other reasons revealing an anterior mediastinal mass.  CT of the chest   showed a 45o17i4.8 cm mass and biopsy confirmed a thymoma.  Scan also revealed   a nodule laterally in right upper lobe, which had uptake on PET scan.  She   was presented at the multidisciplinary lung tumor board and it was felt that   upfront resection was warranted.  She did get a second opinion at [facility] who   had recommended neoadjuvant chemotherapy with the patient would prefer to   proceed directly with resection.  We discussed risks, benefits and   alternatives including risk of phrenic nerve injury and she wished to proceed.     FINDINGS:  The mass was quite large and firm, but there was no invasion of   pericardium.  The phrenic nerve was involved on the left side.  It was unable   to be saved in order to get all of the tumor out.  There was a small firm   nodule laterally in the right upper lobe with puckering of the surface and   this was removed with wedge resection.     DESCRIPTION OF PROCEDURE:  The patient identified, general anesthetic   administered and monitoring lines placed.  Her chest was prepped and draped in   the usual sterile fashion.  An incision was made in the mid sternum several   centimeters below the sternal notch down through the xiphoid.  Cautery was   used to incise tissues down onto the bone.  Sternal saw was used to open the   sternum and hemostasis was controlled with Gelfoam.  Retractors were placed.     Sternum was spread and the tumor was found.  Starting medially, the tumor was   able to be removed from the pericardium using the Harmonic scalpel.  This   looked very well circumscribed with no evidence of invasion of the   pericardium.  Dissection was carried inferiorly and the pleura was opened.    The pleura was divided anteriorly along the entire length of the chest cavity.    The tumor was dissected off the pericardium.  This was then dissected   superiorly and the brachiocephalic vein identified.  A small vein leading to   the tumor was clipped and divided.  A small artery was also clipped and   divided.  Dissection was then carried inferiorly and the phrenic nerve   identified there.  I was able to dissect the tumor away from the pericardium   and pleura, staying anterior to the phrenic nerve on the inferior aspect.    There was no involvement of the overlying lung.     I then dissected medially, rotating the tumor in the left chest cavity.  At   this point, it was apparent that the phrenic nerve was going through the tumor   and was unable to be saved on the superior aspect.  Final attachments were   divided with the Harmonic scalpel.  The mass removed and sent for permanent   histology.  Hemostasis was assured.     The right pleura was then opened and the lung isolation carried out.    Palpation confirmed a mass laterally in the right upper lobe.  This was   elevated and wedge resection carried out with several firings of the Skyline   45 powered stapler using blue loads.  Palpation confirmed the lesion in the   center of the specimen.  Hemostasis was assured and the lung again inflated.    Once hemostasis was assured in both sides of the chest, the sternal retractor   was removed.  The sternum was then closed with sternal wires.  The musculature   was closed with a running 0 Vicryl and the subcutaneous tissues were   approximated with running 2-0 Vicryl and skin closed with running 4-0 Vicryl    subcuticular sutures.  Prior to closing, 2 Curt drains were passed, one end   of the right chest cavity and the other one in the anterior mediastinum.    These were secured to skin with silk.  Sterile dressings were applied.  The   tube was attached to pleural suction device and the patient returned to   recovery in stable condition.     COUNT:  Sponge and needle counts were correct x2 at the end of the procedure.     ESTIMATED BLOOD LOSS:  50 mL.        ______________________________  JOHN H. GANSER, MD    J[facility]/JAYCEE    DD:  03/11/2024 09:39  DT:  03/11/2024 10:26    Job#:  [id number]    CC:MD Ila Larson PA-C

## 2024-03-11 NOTE — ANESTHESIA PROCEDURE NOTES
Arterial Line    Performed by: Yong Anguiano M.D.  Authorized by: Yong Anguiano M.D.    Start Time:  3/11/2024 7:42 AM  End Time:  3/11/2024 7:43 AM  Localization: surface landmarks    Patient Location:  OR  Indication: continuous blood pressure monitoring        Catheter Size:  20 G  Seldinger Technique?: Yes    Laterality:  Left  Site:  Radial artery  Line Secured:  Antimicrobial disc, tape and transparent dressing  Events: patient tolerated procedure well with no complications

## 2024-03-11 NOTE — ANESTHESIA PREPROCEDURE EVALUATION
" Case: [id number] Date/Time: 03/11/24 0715    Procedure: STERNOTOMY, RESECTION THYMOMA, WEDGE RESECTION RIGHT UPPER LOBE (Right: Chest)    Anesthesia type: General    Pre-op diagnosis: THYMOMA    Location: [facility] OR 11 / SURGERY [facility]    Surgeons: John H Ganser, M.D.            Relevant Problems   ANESTHESIA   (positive) Other sleep apnea      CARDIAC   (positive) First degree hemorrhoids      ENDO   (positive) Acquired hypothyroidism      Other   (positive) Arthritis   /65   Pulse 63   Temp 37.2 °C (99 °F) (Temporal)   Resp 18   Ht 1.676 m (5' 6\")   Wt 67 kg (147 lb 11.3 oz)   SpO2 95%   BMI 23.84 kg/m²       Physical Exam    Airway   Mallampati: II  TM distance: >3 FB  Neck ROM: full       Cardiovascular - normal exam  Rhythm: regular  Rate: normal  (-) murmur     Dental - normal exam           Pulmonary - normal exam  Breath sounds clear to auscultation     Abdominal    Neurological - normal exam                   Anesthesia Plan    ASA 3       Plan - general       Airway plan will be ETT  LILIAN Planned        Induction: intravenous    Postoperative Plan: Postoperative administration of opioids is intended.    Pertinent diagnostic labs and testing reviewed    Informed Consent:    Anesthetic plan and risks discussed with patient.    Use of blood products discussed with: patient whom consented to blood products.           "

## 2024-03-11 NOTE — OR SURGEON
Immediate Post OP Note    PreOp Diagnosis: Thymoma, right upper lobe lung mass      PostOp Diagnosis: Same      Procedure(s):  STERNOTOMY, RESECTION THYMOMA, WEDGE RESECTION RIGHT UPPER LOBE - Wound Class: Clean Contaminated    Surgeon(s):  Michael H Gerber, M.D. John H Ganser, M.D.    Anesthesiologist/Type of Anesthesia:  Anesthesiologist: Yong Anguiano M.D./General    Surgical Staff:  Circulator: Sylvie Brand RJarekNJarek; Alis Nunez R.N.  Scrub Person: Vee Flores; Carina Lyons    Specimens removed if any:  ID Type Source Tests Collected by Time Destination   A : Thymoma Other Other PATHOLOGY SPECIMEN John H Ganser, M.D. 3/11/2024  8:36 AM    B : Right Upper Lobe Lung Mass Tissue Lung PATHOLOGY SPECIMEN John H Ganser, M.D. 3/11/2024  8:41 AM        Estimated Blood Loss: 0    Findings: Tumor involved left phrenic nerve    Complications: 0        3/11/2024 9:30 AM John H Ganser, M.D.

## 2024-03-11 NOTE — ANESTHESIA POSTPROCEDURE EVALUATION
Patient: Lizy Saleem    Procedure Summary       Date: 03/11/24 Room / Location: [facility] 11 / SURGERY [facility]    Anesthesia Start: 0734 Anesthesia Stop: 0945    Procedure: STERNOTOMY, RESECTION THYMOMA, WEDGE RESECTION RIGHT UPPER LOBE (Right: Chest) Diagnosis: (THYMOMA)    Surgeons: John H Ganser, M.D. Responsible Provider: Yong Anguiano M.D.    Anesthesia Type: general ASA Status: 3            Final Anesthesia Type: general  Last vitals  BP   Blood Pressure: (!) 151/67    Temp   36.1 °C (96.9 °F)    Pulse   90   Resp   16    SpO2   96 %      Anesthesia Post Evaluation    Patient location during evaluation: PACU  Patient participation: complete - patient participated  Level of consciousness: awake and alert    Airway patency: patent  Anesthetic complications: no  Cardiovascular status: hemodynamically stable  Respiratory status: face mask    PONV: none          No notable events documented.     Nurse Pain Score: 5 (NPRS)

## 2024-03-11 NOTE — OR NURSING
Pt arrives from OR to PACU at 0939. Pt identification verified by team, pt placed on all monitors with alarms audible, report and care of pt received from Anesthesiologist and RN. Assessment completed, pt changed into hospital gown and provided with warm blankets. Chest tubes x 2 placed to suction, georges catheter secured to right thigh.

## 2024-03-11 NOTE — ANESTHESIA PROCEDURE NOTES
Peripheral Block    Date/Time: 3/11/2024 9:31 AM    Performed by: Yong Anguiano M.D.  Authorized by: Yong Anguiano M.D.    Patient Location:  OR  Start Time:  3/11/2024 9:31 AM  End Time:  3/11/2024 9:35 AM  Reason for Block: at surgeon's request and post-op pain management ONLY    patient identified, IV checked, site marked, risks and benefits discussed, surgical consent, monitors and equipment checked, pre-op evaluation and timeout performed    Patient Position:  Supine  Prep: ChloraPrep    Monitoring:  Heart rate, continuous pulse ox and cardiac monitor  Block Region:  Trunk  Trunk - Block Type:  Multiple level INTERCOSTAL block    Laterality:  Bilateral  Procedures: ultrasound guided  Image captured, interpreted and electronically stored.  Strength:  1 %  Dose:  3 ml  Block Type:  Single-shot  Needle Length:  100mm  Needle Gauge:  21 G  Needle Localization:  Ultrasound guidance  Ultrasound picture in chart  Injection Assessment:  Negative aspiration for heme, no paresthesia on injection, incremental injection and local visualized surrounding nerve on ultrasound  Evidence of intravascular injection: No     Pectointercostal nerve block placed bilaterally. U/S probe placed just lateral to  R sternum with visualization of space between third and fourth rib.  In plane approach of needle to just above transversus thoracis muscle with injection of 15 cc 0.25 % bupivacaine in divided doses.  Procedure repeated on L side of sternum as well.  No apparent complications.

## 2024-03-11 NOTE — ANESTHESIA PROCEDURE NOTES
LILIAN    Date/Time: 3/11/2024 7:52 AM    Performed by: Yong Anguiano M.D.  Authorized by: Yong Anguiano M.D.    Start Time:3/11/2024 7:52 AM  Preanesthetic Checklist: patient identified, IV checked, site marked, risks and benefits discussed, surgical consent, monitors and equipment checked, pre-op evaluation and timeout performed    Indication for LILIAN: diagnostic   Patient Location: OR  Intubated: Yes  Bite Block: Yes  Heart Visualized: Yes  Insertion: atraumatic    **See FULL LILIAN report in patient's chart via CV Syn[facility]**

## 2024-03-11 NOTE — ANESTHESIA TIME REPORT
Anesthesia Start and Stop Event Times       Date Time Event    3/11/2024 0724 Ready for Procedure     0734 Anesthesia Start     0945 Anesthesia Stop          Responsible Staff  03/11/24      Name Role Begin End    Yong Anguiano M.D. Anesth 0734 0914          Overtime Reason:  no overtime (within assigned shift)    Comments:

## 2024-03-12 ENCOUNTER — HOME HEALTH ADMISSION (OUTPATIENT)
Dept: HOME HEALTH SERVICES | Facility: HOME HEALTHCARE | Age: 64
End: 2024-03-12
Payer: COMMERCIAL

## 2024-03-12 LAB
ANION GAP SERPL CALC-SCNC: 9 MMOL/L (ref 7–16)
BUN SERPL-MCNC: 15 MG/DL (ref 8–22)
CALCIUM SERPL-MCNC: 8.5 MG/DL (ref 8.5–10.5)
CHLORIDE SERPL-SCNC: 102 MMOL/L (ref 96–112)
CO2 SERPL-SCNC: 25 MMOL/L (ref 20–33)
CREAT SERPL-MCNC: 0.59 MG/DL (ref 0.5–1.4)
ERYTHROCYTE [DISTWIDTH] IN BLOOD BY AUTOMATED COUNT: 57.9 FL (ref 35.9–50)
GFR SERPLBLD CREATININE-BSD FMLA CKD-EPI: 101 ML/MIN/1.73 M 2
GLUCOSE SERPL-MCNC: 108 MG/DL (ref 65–99)
HCT VFR BLD AUTO: 33.8 % (ref 37–47)
HGB BLD-MCNC: 11.1 G/DL (ref 12–16)
MCH RBC QN AUTO: 29.8 PG (ref 27–33)
MCHC RBC AUTO-ENTMCNC: 32.8 G/DL (ref 32.2–35.5)
MCV RBC AUTO: 90.6 FL (ref 81.4–97.8)
PLATELET # BLD AUTO: 215 K/UL (ref 164–446)
PMV BLD AUTO: 11.8 FL (ref 9–12.9)
POTASSIUM SERPL-SCNC: 4.3 MMOL/L (ref 3.6–5.5)
RBC # BLD AUTO: 3.73 M/UL (ref 4.2–5.4)
SODIUM SERPL-SCNC: 136 MMOL/L (ref 135–145)
WBC # BLD AUTO: 9.6 K/UL (ref 4.8–10.8)

## 2024-03-12 PROCEDURE — 97166 OT EVAL MOD COMPLEX 45 MIN: CPT

## 2024-03-12 PROCEDURE — 700102 HCHG RX REV CODE 250 W/ 637 OVERRIDE(OP): Performed by: SURGERY

## 2024-03-12 PROCEDURE — 85027 COMPLETE CBC AUTOMATED: CPT

## 2024-03-12 PROCEDURE — A9270 NON-COVERED ITEM OR SERVICE: HCPCS | Performed by: SURGERY

## 2024-03-12 PROCEDURE — 770001 HCHG ROOM/CARE - MED/SURG/GYN PRIV*

## 2024-03-12 PROCEDURE — 36415 COLL VENOUS BLD VENIPUNCTURE: CPT

## 2024-03-12 PROCEDURE — 97535 SELF CARE MNGMENT TRAINING: CPT

## 2024-03-12 PROCEDURE — 700111 HCHG RX REV CODE 636 W/ 250 OVERRIDE (IP): Performed by: SURGERY

## 2024-03-12 PROCEDURE — 80048 BASIC METABOLIC PNL TOTAL CA: CPT

## 2024-03-12 PROCEDURE — 97163 PT EVAL HIGH COMPLEX 45 MIN: CPT

## 2024-03-12 RX ADMIN — ACETAMINOPHEN 1000 MG: 500 TABLET, FILM COATED ORAL at 03:38

## 2024-03-12 RX ADMIN — ENOXAPARIN SODIUM 40 MG: 100 INJECTION SUBCUTANEOUS at 09:13

## 2024-03-12 RX ADMIN — PROCHLORPERAZINE EDISYLATE 10 MG: 5 INJECTION INTRAMUSCULAR; INTRAVENOUS at 12:38

## 2024-03-12 RX ADMIN — LEVOTHYROXINE SODIUM 75 MCG: 0.07 TABLET ORAL at 05:20

## 2024-03-12 RX ADMIN — VENLAFAXINE HYDROCHLORIDE 37.5 MG: 37.5 CAPSULE, EXTENDED RELEASE ORAL at 09:12

## 2024-03-12 RX ADMIN — OXYCODONE 5 MG: 5 TABLET ORAL at 03:38

## 2024-03-12 RX ADMIN — OMEPRAZOLE 20 MG: 20 CAPSULE, DELAYED RELEASE ORAL at 05:21

## 2024-03-12 RX ADMIN — ACETAMINOPHEN 1000 MG: 500 TABLET, FILM COATED ORAL at 19:04

## 2024-03-12 RX ADMIN — VALACYCLOVIR HYDROCHLORIDE 1000 MG: 500 TABLET, FILM COATED ORAL at 05:20

## 2024-03-12 RX ADMIN — KETOROLAC TROMETHAMINE 15 MG: 15 INJECTION, SOLUTION INTRAMUSCULAR; INTRAVENOUS at 23:04

## 2024-03-12 RX ADMIN — ACETAMINOPHEN 1000 MG: 500 TABLET, FILM COATED ORAL at 12:38

## 2024-03-12 RX ADMIN — PROCHLORPERAZINE EDISYLATE 10 MG: 5 INJECTION INTRAMUSCULAR; INTRAVENOUS at 19:05

## 2024-03-12 RX ADMIN — OXYCODONE 5 MG: 5 TABLET ORAL at 23:09

## 2024-03-12 RX ADMIN — KETOROLAC TROMETHAMINE 15 MG: 15 INJECTION, SOLUTION INTRAMUSCULAR; INTRAVENOUS at 12:38

## 2024-03-12 RX ADMIN — PROCHLORPERAZINE EDISYLATE 10 MG: 5 INJECTION INTRAMUSCULAR; INTRAVENOUS at 03:39

## 2024-03-12 RX ADMIN — OXYCODONE 5 MG: 5 TABLET ORAL at 09:25

## 2024-03-12 RX ADMIN — KETOROLAC TROMETHAMINE 15 MG: 15 INJECTION, SOLUTION INTRAMUSCULAR; INTRAVENOUS at 19:05

## 2024-03-12 RX ADMIN — KETOROLAC TROMETHAMINE 15 MG: 15 INJECTION, SOLUTION INTRAMUSCULAR; INTRAVENOUS at 05:20

## 2024-03-12 RX ADMIN — ACETAMINOPHEN 1000 MG: 500 TABLET, FILM COATED ORAL at 23:04

## 2024-03-12 ASSESSMENT — ACTIVITIES OF DAILY LIVING (ADL): TOILETING: INDEPENDENT

## 2024-03-12 ASSESSMENT — COGNITIVE AND FUNCTIONAL STATUS - GENERAL
SUGGESTED CMS G CODE MODIFIER MOBILITY: CK
CLIMB 3 TO 5 STEPS WITH RAILING: A LOT
MOVING TO AND FROM BED TO CHAIR: A LITTLE
DRESSING REGULAR LOWER BODY CLOTHING: A LOT
DAILY ACTIVITIY SCORE: 19
MOBILITY SCORE: 17
TURNING FROM BACK TO SIDE WHILE IN FLAT BAD: A LITTLE
HELP NEEDED FOR BATHING: A LOT
STANDING UP FROM CHAIR USING ARMS: A LITTLE
SUGGESTED CMS G CODE MODIFIER DAILY ACTIVITY: CK
MOVING FROM LYING ON BACK TO SITTING ON SIDE OF FLAT BED: A LITTLE
TOILETING: A LITTLE
WALKING IN HOSPITAL ROOM: A LITTLE

## 2024-03-12 ASSESSMENT — LIFESTYLE VARIABLES
HAVE YOU EVER FELT YOU SHOULD CUT DOWN ON YOUR DRINKING: NO
TOTAL SCORE: 0
EVER HAD A DRINK FIRST THING IN THE MORNING TO STEADY YOUR NERVES TO GET RID OF A HANGOVER: NO
TOTAL SCORE: 0
HAVE PEOPLE ANNOYED YOU BY CRITICIZING YOUR DRINKING: NO
TOTAL SCORE: 0
DOES PATIENT WANT TO STOP DRINKING: NO
CONSUMPTION TOTAL: NEGATIVE
ON A TYPICAL DAY WHEN YOU DRINK ALCOHOL HOW MANY DRINKS DO YOU HAVE: 0
AVERAGE NUMBER OF DAYS PER WEEK YOU HAVE A DRINK CONTAINING ALCOHOL: 0
HOW MANY TIMES IN THE PAST YEAR HAVE YOU HAD 5 OR MORE DRINKS IN A DAY: 0
EVER FELT BAD OR GUILTY ABOUT YOUR DRINKING: NO
ALCOHOL_USE: NO

## 2024-03-12 ASSESSMENT — PATIENT HEALTH QUESTIONNAIRE - PHQ9
1. LITTLE INTEREST OR PLEASURE IN DOING THINGS: NOT AT ALL
2. FEELING DOWN, DEPRESSED, IRRITABLE, OR HOPELESS: NOT AT ALL
SUM OF ALL RESPONSES TO PHQ9 QUESTIONS 1 AND 2: 0

## 2024-03-12 ASSESSMENT — GAIT ASSESSMENTS
GAIT LEVEL OF ASSIST: CONTACT GUARD ASSIST
ASSISTIVE DEVICE: FRONT WHEEL WALKER
DISTANCE (FEET): 60
DEVIATION: BRADYKINETIC;SHUFFLED GAIT

## 2024-03-12 ASSESSMENT — PAIN DESCRIPTION - PAIN TYPE
TYPE: SURGICAL PAIN
TYPE: SURGICAL PAIN;ACUTE PAIN

## 2024-03-12 NOTE — PROGRESS NOTES
Report received from day shift RN, assumed Care.   Patient is AOx4, responds appropriately. Drowsy     Pain controlled at this time.  Patient is on full liquid diet, nausea/vomiting managed per MAR  Refusing ambulation at this time d/t pain/nausea    Plan of care discussed, all questions answered.    Educated on use of call light and importance of calling before getting out of bed. Pt verbalizes understanding.    Call light and belongings within reach, treaded slipper socks on, bed alarm in use, SCDs in use, bed in lowest locked position.  All needs met at this time.

## 2024-03-12 NOTE — THERAPY
"Physical Therapy   Initial Evaluation     Patient Name: Lizy Saleem  Age:  63 y.o., Sex:  female  Medical Record #: [id number]  Today's Date: 3/12/2024     Precautions  Precautions: Fall Risk;Chest Tube;Sternal Precautions (See Comments)    Assessment  Pt is a 63 y.o. female who presented to hospital for scheduled sternotomy with excision of thymoma and wedge resection upper lobe lung nodule.     Pt presents to therapy with impairments in bed mobility, knowledge of sternal precautions, and ambulation speed. Pt was educated on sternal precautions and log roll to get out of bed, and needed mild cuing throughout session to maintain precautions. Pt mobilized as listed below and demos moderate bradykinesia and LE weakness during ambulation. Sessions should focus on sternal precautions, ambulation quality, and LE strength. Pt lives alone and has recently lost her , but reports a friend is coming to stay with her and she has a good support system. Recommend discharge home with home health for further therapy needs, will follow in-house.     Plan    Physical Therapy Initial Treatment Plan   Treatment Plan : Bed Mobility, Equipment, Gait Training, Neuro Re-Education / Balance, Self Care / Home Evaluation, Stair Training, Therapeutic Exercise  Treatment Frequency: 3 Times per Week  Duration: Until Therapy Goals Met    DC Equipment Recommendations: Front-Wheel Walker  Discharge Recommendations: Recommend home health for continued physical therapy services       Subjective    \"It is going to take me a while to remember all of that.\"     Objective       03/12/24 1100   Precautions   Precautions Fall Risk;Chest Tube;Sternal Precautions (See Comments)   Vitals   Patient BP Position Sitting   Blood Pressure (!) 142/71   Pulse Oximetry 93 %   O2 (LPM) 0   O2 Delivery Device None - Room Air   Vitals Comments BP after ambulation: 129/67. HR taken after ambulation. Nurse notified   Pain 0 - 10 Group   Location Chest "   Location Orientation Mid   Pain Rating Scale (NPRS) 4   Description Aching   Therapist Pain Assessment Post Activity Pain Same as Prior to Activity;Nurse Notified;4   Prior Living Situation   Prior Services None   Housing / Facility 1 Story House   Steps Into Home 1   Steps In Home 0   Equipment Owned None  (Friend has FWW)   Lives with - Patient's Self Care Capacity Alone and Able to Care For Self  (Recently lost spouse)   Comments Pt has friends coming to stay with her and reports she has a good support system   Prior Level of Functional Mobility   Bed Mobility Independent   Transfer Status Independent   Ambulation Independent   Ambulation Distance Community   Assistive Devices Used None   Stairs Independent   Comments Active at baseline   History of Falls   History of Falls No   Cognition    Cognition / Consciousness WDL   Level of Consciousness Alert   Comments Pt is pleasant and cooperative, motivated for therapy   Active ROM Upper Body   Active ROM Upper Body  WDL   Strength Upper Body   Upper Body Strength  WDL   Comments Limited 2/2 sternal precautions   Sensation Upper Body   Upper Extremity Sensation  WDL   Active ROM Lower Body    Active ROM Lower Body  WDL   Strength Lower Body   Lower Body Strength  WDL   Sensation Lower Body   Lower Extremity Sensation   WDL   Coordination Lower Body    Coordination Lower Body  WDL   Other Treatments   Other Treatments Provided Education: IS, heart pillow uses, log roll, sternal precautions   Balance Assessment   Sitting Balance (Static) Good   Sitting Balance (Dynamic) Fair +   Standing Balance (Static) Fair   Standing Balance (Dynamic) Fair -   Weight Shift Sitting Good   Weight Shift Standing Fair   Comments FWW   Bed Mobility    Supine to Sit Minimal Assist   Scooting Contact Guard Assist   Rolling Minimal Assist to Rt.   Comments Pt requires Carrie for supine->sit with mod cuing for precautions. She was able to hug pillow and roll to R with HOB elevated   Gait  Analysis   Gait Level Of Assist Contact Guard Assist   Assistive Device Front Wheel Walker   Distance (Feet) 60   # of Times Distance was Traveled 1   Deviation Bradykinetic;Shuffled Gait   Comments pt demos moderate bradykinesia and c/o feeling weak in LE. Pt stops for standing rest breaks and is cued for breathing.   Functional Mobility   Sit to Stand Contact Guard Assist   Bed, Chair, Wheelchair Transfer Contact Guard Assist   Toilet Transfers Minimal Assist   Transfer Method Stand Step   Mobility Supine->EOB->hallway->bathroom->chair   Comments Pt requires assistance for STS from toilet, but no other physical assistance needed.   6 Clicks Assessment - How much HELP from from another person do you currently need... (If the patient hasn't done an activity recently, how much help from another person do you think he/she would need if he/she tried?)   Turning from your back to your side while in a flat bed without using bedrails? 3   Moving from lying on your back to sitting on the side of a flat bed without using bedrails? 3   Moving to and from a bed to a chair (including a wheelchair)? 3   Standing up from a chair using your arms (e.g., wheelchair, or bedside chair)? 3   Walking in hospital room? 3   Climbing 3-5 steps with a railing? 2   6 clicks Mobility Score 17   Activity Tolerance   Sitting in Chair 5 min. Up post session   Sitting Edge of Bed 3 min   Standing 5 min   Edema / Skin Assessment   Edema / Skin  WDL   Comments Sternal incision bandaged   Short Term Goals    Short Term Goal # 1 Pt will ambulate 200 ft w/ LRAD and SPV in 6 visits to [facility] improved activity tolerance   Short Term Goal # 2 pt will complete B log roll with HOB flat and SPV, maintaining sternal precautions in 6 visits.   Short Term Goal # 3 pt will complete 1 step with LRAD and SPV in 6 visits to [facility] ability to get inside home.   Short Term Goal # 4 Pt will complete stand step transfer with LRAD and SPV in 6 visits to [facility] improved  independence.   Education Group   Education Provided Sternal Precautions;Role of Physical Therapist;Gait Training;Use of Assistive Device   Sternal Precautions Patient Response Patient;Eager;Explanation;Verbal Demonstration   Role of Physical Therapist Patient Response Patient;Eager;Explanation;Verbal Demonstration   Gait Training Patient Response Patient;Eager;Explanation;Action Demonstration   Use of Assistive Device Patient Response Patient;Eager;Demonstration;Verbal Demonstration   Physical Therapy Initial Treatment Plan    Treatment Plan  Bed Mobility;Equipment;Gait Training;Neuro Re-Education / Balance;Self Care / Home Evaluation;Stair Training;Therapeutic Exercise   Treatment Frequency 3 Times per Week   Duration Until Therapy Goals Met   Problem List    Problems Pain;Impaired Bed Mobility;Impaired Transfers;Impaired Ambulation;Functional ROM Deficit;Functional Strength Deficit;Decreased Activity Tolerance;Limited Knowledge of Post-Op Precautions   Anticipated Discharge Equipment and Recommendations   DC Equipment Recommendations Front-Wheel Walker   Discharge Recommendations Recommend home health for continued physical therapy services   Interdisciplinary Plan of Care Collaboration   IDT Collaboration with  Nursing;Occupational Therapist   Patient Position at End of Therapy Seated;Call Light within Reach;Tray Table within Reach   Collaboration Comments DC recs, mobility status   Session Information   Date / Session Number  3/12- 1(1/3, 3/18)     Brice Elizabeth, SPT

## 2024-03-12 NOTE — CARE PLAN
The patient is Stable - Low risk of patient condition declining or worsening    Shift Goals  Clinical Goals: n/v decreased, pain control  Patient Goals: nausea control, sleep, pain control  Family Goals: POC    Progress made toward(s) clinical / shift goals:    Problem: Pain - Standard  Goal: Alleviation of pain or a reduction in pain to the patient’s comfort goal  Description: Target End Date:  Prior to discharge or change in level of care    Document on Vitals flowsheet    1.  Document pain using the appropriate pain scale per order or unit policy  2.  Educate and implement non-pharmacologic comfort measures (i.e. relaxation, distraction, massage, cold/heat therapy, etc.)  3.  Pain management medications as ordered  4.  Reassess pain after pain med administration per policy  5.  If opiods administered assess patient's response to pain medication is appropriate per POSS sedation scale  6.  Follow pain management plan developed in collaboration with patient and interdisciplinary team (including palliative care or pain specialists if applicable)  Outcome: Progressing     Pain resting comfortably in bed, managed with scheduled meds. Sleeping comfortably, Education provided on Medications and POC.    Patient is not progressing towards the following goals:

## 2024-03-12 NOTE — PROGRESS NOTES
4 Eyes Skin Assessment Completed by DARREL Fournier and DARREL Day.    Head WDL  Ears WDL  Nose WDL  Mouth WDL  Neck R IJ triple lumen  Breast/Chest Incision- Sternotomy incision to silver surgical dressing  Shoulder Blades WDL  Spine WDL  (R) Arm/Elbow/Hand WDL  (L) Arm/Elbow/Hand WDL  Abdomen WDL  Groin WDL/georges in place  Scrotum/Coccyx/Buttocks WDL  (R) Leg WDL  (L) Leg WDL  (R) Heel/Foot/Toe WDL  (L) Heel/Foot/Toe WDL          Devices In Places Blood Pressure Cuff, Pulse Ox, Georges, and SCD's      Interventions In Place Gray Ear Foams, Pillows, and Low Air Loss Mattress    Possible Skin Injury No    Pictures Uploaded Into Epic N/A  Wound Consult Placed N/A  RN Wound Prevention Protocol Ordered Yes

## 2024-03-12 NOTE — PROGRESS NOTES
"Progress Note:    S: Nauseated with emesis yesterday, better today  Walked  Using IS  Pain controlled    O:  Recent Labs     03/12/24  0530   WBC 9.6   RBC 3.73*   HEMOGLOBIN 11.1*   HEMATOCRIT 33.8*   MCV 90.6   MCH 29.8   MCHC 32.8   RDW 57.9*   PLATELETCT 215   MPV 11.8     Recent Labs     03/12/24  0530   SODIUM 136   POTASSIUM 4.3   CHLORIDE 102   CO2 25   GLUCOSE 108*   BUN 15   CREATININE 0.59   CALCIUM 8.5         Current Facility-Administered Medications   Medication Dose    Pharmacy Consult Request ...Pain Management Review 1 Each  1 Each    ondansetron (Zofran) syringe/vial injection 4 mg  4 mg    diphenhydrAMINE (Benadryl) injection 25 mg  25 mg    haloperidol lactate (Haldol) injection 1 mg  1 mg    scopolamine (Transderm-Scop) patch 1 Patch  1 Patch    enoxaparin (Lovenox) inj 40 mg  40 mg    acetaminophen (Tylenol) tablet 1,000 mg  1,000 mg    Followed by    [START ON 3/16/2024] acetaminophen (Tylenol) tablet 1,000 mg  1,000 mg    ketorolac (Toradol) 15 MG/ML injection 15 mg  15 mg    Followed by    [START ON 3/14/2024] ibuprofen (Motrin) tablet 800 mg  800 mg    oxyCODONE immediate-release (Roxicodone) tablet 5 mg  5 mg    Or    oxyCODONE immediate-release (Roxicodone) tablet 10 mg  10 mg    Or    HYDROmorphone (Dilaudid) injection 0.5 mg  0.5 mg    levothyroxine (Synthroid) tablet 75 mcg  75 mcg    omeprazole (PriLOSEC) capsule 20 mg  20 mg    valACYclovir (Valtrex) caplet 1,000 mg  1,000 mg    venlafaxine XR (Effexor XR) capsule 37.5 mg  37.5 mg    prochlorperazine (Compazine) injection 10 mg  10 mg       PE:  BP (!) 142/71   Pulse 65   Temp 36.6 °C (97.9 °F) (Temporal)   Resp 16   Ht 1.676 m (5' 6\")   Wt 67 kg (147 lb 11.3 oz)   SpO2 93%     Intake/Output Summary (Last 24 hours) at 3/12/2024 1254  Last data filed at 3/12/2024 0700  Gross per 24 hour   Intake 350 ml   Output 1528 ml   Net -1178 ml       Chest tubes: Minimal output, no air leak  Heart regular    Rads:  EC-LILIAN W/O CONT   Final " Result      DX-CHEST-LIMITED (1 VIEW)   Final Result      Postoperative changes of the chest. Hazy patchy right pulmonary opacity could be atelectasis, edema and/or contusion.      Small right pneumothorax with right chest tube in place.      Previously seen mediastinal/left hilar mass is no longer well visualized radiographically.          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  [facility]

## 2024-03-12 NOTE — DISCHARGE PLANNING
Case Management Discharge Planning    Admission Date: 3/11/2024  GMLOS: 2.2  ALOS: 1    6-Clicks ADL Score: 19  6-Clicks Mobility Score: 17  PT and/or OT Eval ordered: Yes  Post-acute Referrals Ordered: No  Post-acute Choice Obtained: Yes  Has referral(s) been sent to post-acute provider:  NA      Anticipated Discharge Dispo: Discharge Disposition: D/T to home under HHA care in anticipation of covered skilled care (06)    DME Needed: No    Action(s) Taken: Chart review completed. Patient discussed during IDT rounds.     PT/OT recommending home health for patient; RNCM reached out to provider via Voalte for referral; proactively obtained choice for [facility]    Choice form faxed to [facility] and placed into patient's media file.     Escalations Completed: None    Medically Clear: No    Next Steps: CM to follow up with IDT regarding discharge planning needs/barriers. CM to send home health referral when order received.     Barriers to Discharge: Medical clearance    Is the patient up for discharge tomorrow: No

## 2024-03-12 NOTE — CARE PLAN
The patient is Watcher - Medium risk of patient condition declining or worsening    Shift Goals  Clinical Goals: Decrease N/V/Complete ERAS  Patient Goals: POC  Family Goals: POC    Progress made toward(s) clinical / shift goals:  Patient verbalized understanding of POC, no further questions. Treated N/V with prn medication.ERAS completed. Pain treated per MAR    Patient is not progressing towards the following goals:

## 2024-03-12 NOTE — THERAPY
"Occupational Therapy   Initial Evaluation     Patient Name: Lizy Saleem  Age:  63 y.o., Sex:  female  Medical Record #: [id number]  Today's Date: 3/12/2024     Precautions  Precautions: Fall Risk, Sternal Precautions (See Comments), Chest Tube  Comments: chest tube x2    Assessment  Pt is a 63 y.o. female who presented to hospital for scheduled sternotomy with excision of thymoma and wedge resection upper lobe lung nodule.     Pt seen for OT eval. Pt provided with educ for sternal precautions, following well throughout eval. Pt presents with impairments in balance, strength, activity tolerance impacting all ADLs and functional mobility. Pt required assist for ADLs however demo'd good carry over of learning. Pt will continue to benefit from inpt OT for additional session focusing on ADL performance. Pt recently lost , will have friend to assist as needed upon dc. Recommend HHOT post dc.       Plan    Occupational Therapy Initial Treatment Plan   Treatment Interventions: Self Care / Activities of Daily Living, Therapeutic Exercises, Therapeutic Activity, Adaptive Equipment  Treatment Frequency: 3 Times per Week  Duration: Until Therapy Goals Met    DC Equipment Recommendations: Tub / Shower Seat, Raised Toilet Seat without Arms  Discharge Recommendations: Recommend home health for continued occupational therapy services     Subjective    \"I will have the help I need at home\"      Objective       24 1120   Prior Living Situation   Prior Services None   Housing / Facility 1 Story House   Steps Into Home 1   Steps In Home 0   Bathroom Set up Walk In Shower   Equipment Owned None  (friend has FWW)   Lives with - Patient's Self Care Capacity Alone and Able to Care For Self  (pt spouse  in 2023)   Comments pt will have friend who can stay with her  as needed upon dc   Prior Level of ADL Function   Self Feeding Independent   Grooming / Hygiene Independent   Bathing Independent   Dressing " Independent   Toileting Independent   Prior Level of IADL Function   Medication Management Independent   Laundry Independent   Kitchen Mobility Independent   Finances Independent   Home Management Independent   Shopping Independent   Prior Level Of Mobility Independent Without Device in Community   Driving / Transportation Driving Independent   History of Falls   History of Falls No   Precautions   Precautions Fall Risk;Sternal Precautions (See Comments);Chest Tube   Comments chest tube x2   Vitals   Patient BP Position Standing 1 minute   Blood Pressure 129/67   Pulse Oximetry 93 %   O2 (LPM) 0   O2 Delivery Device None - Room Air   Vitals Comments BP post ambulation   Pain   Intervention Medication (see MAR)   Pain 0 - 10 Group   Location Chest   Location Orientation Mid   Pain Rating Scale (NPRS) 4   Therapist Pain Assessment Post Activity Pain Same as Prior to Activity;Nurse Notified;4   Non Verbal Descriptors   Non Verbal Scale  Calm   Cognition    Cognition / Consciousness WDL   Level of Consciousness Alert   Comments pt pleasant and cooperative, able to follow all commands, make needs known, good safety awareness and carry over of learning   Passive ROM Upper Body   Passive ROM Upper Body WDL   Active ROM Upper Body   Active ROM Upper Body  WDL   Strength Upper Body   Upper Body Strength  WDL   Comments NT due to sternal prec, functional use WFL   Sensation Upper Body   Upper Extremity Sensation  WDL   Upper Body Muscle Tone   Upper Body Muscle Tone  WDL   Neurological Concerns   Neurological Concerns No   Coordination Upper Body   Coordination WDL   Balance Assessment   Sitting Balance (Static) Good   Sitting Balance (Dynamic) Fair +   Standing Balance (Static) Fair   Standing Balance (Dynamic) Fair -   Weight Shift Sitting Good   Weight Shift Standing Fair   Comments w/FWW   Bed Mobility    Supine to Sit Minimal Assist   Scooting Contact Guard Assist   Rolling Minimal Assist to Rt.   Comments required Carrie  for sidelying>sit, multimodal cueing with use of pillow, HOB slightly elevated   ADL Assessment   Eating Independent   Grooming Supervision;Seated   Bathing Moderate Assist   Upper Body Dressing Minimal Assist   Lower Body Dressing Moderate Assist   Toileting Contact Guard Assist   Functional Mobility   Sit to Stand Contact Guard Assist   Bed, Chair, Wheelchair Transfer Contact Guard Assist   Toilet Transfers Minimal Assist   Transfer Method Stand Step   Mobility supine>EOB> hallway> bathroom> chair   Comments w/FWW   Visual Perception   Visual Perception  WDL   Edema / Skin Assessment   Edema / Skin  WDL   Comments sternal incision, Chest tube sites   Activity Tolerance   Sitting in Chair post session   Sitting Edge of Bed 3 min   Standing 10 min   Patient / Family Goals   Patient / Family Goal #1 to go home   Short Term Goals   Short Term Goal # 1 Pt will complete functional transfers supervised   Short Term Goal # 2 Pt will be supervised for UB/LB dressing, following prec throughout   Short Term Goal # 3 Pt will complete toileting supervised   Education Group   Education Provided Role of Occupational Therapist;Transfers;Activities of Daily Living;Sternal Precautions;Adaptive Equipment   Role of Occupational Therapist Patient Response Patient;Acceptance;Explanation;Demonstration;Verbal Demonstration;Action Demonstration   Sternal Precautions Patient Response Patient;Acceptance;Explanation;Demonstration;Verbal Demonstration;Action Demonstration   Transfers Patient Response Patient;Eager;Acceptance;Explanation;Demonstration;Verbal Demonstration;Action Demonstration   ADL Patient Response Patient;Eager;Acceptance;Explanation;Demonstration;Verbal Demonstration;Action Demonstration   Additional Comments educ on ADLs with precautions   Occupational Therapy Initial Treatment Plan    Treatment Interventions Self Care / Activities of Daily Living;Therapeutic Exercises;Therapeutic Activity;Adaptive Equipment   Treatment  Frequency 3 Times per Week   Duration Until Therapy Goals Met   Problem List   Problem List Decreased Active Daily Living Skills;Decreased Activity Tolerance;Decreased Functional Mobility   Anticipated Discharge Equipment and Recommendations   DC Equipment Recommendations Tub / Shower Seat;Raised Toilet Seat without Arms   Discharge Recommendations Recommend home health for continued occupational therapy services   Interdisciplinary Plan of Care Collaboration   IDT Collaboration with  Nursing;Physical Therapist   Patient Position at End of Therapy Seated;Call Light within Reach;Tray Table within Reach   Collaboration Comments DC recs, mobility   Session Information   Date / Session Number  3/12 #1 (1/3, 3/18)

## 2024-03-12 NOTE — DISCHARGE PLANNING
ATTN: Case Management  RE: Referral for Home Health    As of 3/12/24, we have accepted the Home Health referral for the patient listed above.    A [facility] clinician will be out to see the patient within 48 hours. If you have any questions or concerns regarding the patient's transition to Home Health, please do not hesitate to contact us at [phone].      We look forward to collaborating with you,  [facility] Home Health Team

## 2024-03-12 NOTE — DISCHARGE PLANNING
Received Choice form at 141  Agency/Facility Name: [facility]  Referral sent per Choice form at 1434    CM DARREL Lubin Notified

## 2024-03-13 PROCEDURE — 700102 HCHG RX REV CODE 250 W/ 637 OVERRIDE(OP): Performed by: SURGERY

## 2024-03-13 PROCEDURE — 700111 HCHG RX REV CODE 636 W/ 250 OVERRIDE (IP): Mod: JZ | Performed by: SURGERY

## 2024-03-13 PROCEDURE — 770001 HCHG ROOM/CARE - MED/SURG/GYN PRIV*

## 2024-03-13 PROCEDURE — A9270 NON-COVERED ITEM OR SERVICE: HCPCS | Performed by: SURGERY

## 2024-03-13 RX ORDER — DOCUSATE SODIUM 100 MG/1
100 CAPSULE, LIQUID FILLED ORAL 2 TIMES DAILY
Status: DISCONTINUED | OUTPATIENT
Start: 2024-03-13 | End: 2024-03-14 | Stop reason: HOSPADM

## 2024-03-13 RX ADMIN — KETOROLAC TROMETHAMINE 15 MG: 15 INJECTION, SOLUTION INTRAMUSCULAR; INTRAVENOUS at 23:04

## 2024-03-13 RX ADMIN — DOCUSATE SODIUM 100 MG: 100 CAPSULE, LIQUID FILLED ORAL at 17:23

## 2024-03-13 RX ADMIN — LEVOTHYROXINE SODIUM 75 MCG: 0.07 TABLET ORAL at 05:06

## 2024-03-13 RX ADMIN — ACETAMINOPHEN 1000 MG: 500 TABLET, FILM COATED ORAL at 17:22

## 2024-03-13 RX ADMIN — OMEPRAZOLE 20 MG: 20 CAPSULE, DELAYED RELEASE ORAL at 05:07

## 2024-03-13 RX ADMIN — DOCUSATE SODIUM 100 MG: 100 CAPSULE, LIQUID FILLED ORAL at 11:13

## 2024-03-13 RX ADMIN — KETOROLAC TROMETHAMINE 15 MG: 15 INJECTION, SOLUTION INTRAMUSCULAR; INTRAVENOUS at 17:23

## 2024-03-13 RX ADMIN — ENOXAPARIN SODIUM 40 MG: 100 INJECTION SUBCUTANEOUS at 17:22

## 2024-03-13 RX ADMIN — KETOROLAC TROMETHAMINE 15 MG: 15 INJECTION, SOLUTION INTRAMUSCULAR; INTRAVENOUS at 05:06

## 2024-03-13 RX ADMIN — VALACYCLOVIR HYDROCHLORIDE 1000 MG: 500 TABLET, FILM COATED ORAL at 05:06

## 2024-03-13 RX ADMIN — ACETAMINOPHEN 1000 MG: 500 TABLET, FILM COATED ORAL at 05:06

## 2024-03-13 RX ADMIN — VENLAFAXINE HYDROCHLORIDE 37.5 MG: 37.5 CAPSULE, EXTENDED RELEASE ORAL at 08:06

## 2024-03-13 RX ADMIN — KETOROLAC TROMETHAMINE 15 MG: 15 INJECTION, SOLUTION INTRAMUSCULAR; INTRAVENOUS at 11:18

## 2024-03-13 RX ADMIN — ACETAMINOPHEN 1000 MG: 500 TABLET, FILM COATED ORAL at 23:04

## 2024-03-13 ASSESSMENT — PAIN DESCRIPTION - PAIN TYPE
TYPE: ACUTE PAIN;SURGICAL PAIN
TYPE: ACUTE PAIN;SURGICAL PAIN
TYPE: ACUTE PAIN
TYPE: ACUTE PAIN;SURGICAL PAIN

## 2024-03-13 NOTE — HPI: FULL BODY SKIN EXAMINATION
Update History & Physical    The patient's History and Physical of February 27, 2024 was reviewed with the patient and I examined the patient. There was no change. The surgical site was confirmed by the patient and me.     Plan: The risks, benefits, expected outcome, and alternative to the recommended procedure have been discussed with the patient. Patient understands and wants to proceed with the procedure.       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     PRE-PROCEDURE ATTESTATION STATEMENT  I have explained the potential risks, benefits, and side effects of the proposed procedure/treatment, including the risk of death to the patient and/or surrogate. Also, the possibility for the transfusion of blood or blood components (only if potential for transfusion is applicable) was discussed with risks, benefits, and alternatives.   This explanation included discussion of the likelihood of the patient achieving his or her goals and any potential problems that might occur during recuperation. Reasonable alternatives to the proposed procedure/treatment including risks, benefits, and side effects were also discussed, as were the risks related to not receiving the proposed procedure/treatment.  The patient and/or surrogate have elected to proceed with the proposed procedure/treatment..      Sedation and/or anesthesia risk, benefits, and alternatives discussed and questions 
How Severe Are Your Spot(S)?: moderate
What Is The Reason For Today's Visit?: Full Body Skin Examination
What Is The Reason For Today's Visit? (Being Monitored For X): the risk of recurrence of previously treated lesion(s)
Additional History: Lesion on right chest for one week. FBE.

## 2024-03-13 NOTE — CARE PLAN
The patient is Stable - Low risk of patient condition declining or worsening    Shift Goals  Clinical Goals: ambulation, pain mgmt  Patient Goals: rest, pain control  Family Goals: POC    Progress made toward(s) clinical / shift goals:    Problem: Knowledge Deficit - Standard  Goal: Patient and family/care givers will demonstrate understanding of plan of care, disease process/condition, diagnostic tests and medications  Description: Target End Date:  1-3 days or as soon as patient condition allows    Document in Patient Education    1.  Patient and family/caregiver oriented to unit, equipment, visitation policy and means for communicating concern  2.  Complete/review Learning Assessment  3.  Assess knowledge level of disease process/condition, treatment plan, diagnostic tests and medications  4.  Explain disease process/condition, treatment plan, diagnostic tests and medications  Outcome: Progressing     Education provided on medications, poc. Pain well managed per MAR. PRNs and Scheduled meds given.    Patient is not progressing towards the following goals:

## 2024-03-13 NOTE — PROGRESS NOTES
Report received from rosita RN  Pt AAOx4, Alert. Responds appropriately  RA  Pain managed per MAR, resting comfortably.  +void, +flatus, No BM  Tolerating diet, no n/v  Ambulating to BR with steady gait. Standby assist with FWW  Calls appropriately for assistance  SCDs on    Skin: Right IJ, sternal silver island dressing in place. CDI    POC reviewed, education provided. Bed locked and in low position, pt instructed to call for assistance. Comprehension verbalized. Call light within reach, all needs met at this time.

## 2024-03-13 NOTE — PROGRESS NOTES
Bedside report received, assessment completed    A&O x  4, pt calls appropriately  Mobility: Up with x1, HH/ FWW  Fall Risk Assessment: moderate, bed alarm n/a, door notifications n/a  Pain Assessment / Reassessment completed, medication provided per MAR  Diet: FLD, pt stated nausea   LDA:   IV Access: 20g RFA, CDI/ flushed/ SL  CVC Access: CDI/ flushed/ SL  CT: x2 chest tubes removed today, dressing CDI    GI/: + georges removed this morning void, + flatus, PTA BM  DVT Prophylaxis: lovenox, SCD on while in bed   Dano Score: 19, Interventions per flow sheet  Procedures:    - 3/11 Sternotomy, Resection RU lobe  D/C Plan:    - Pending medical clearance and diet tolerance     Reviewed plan of care with patient, bed in lowest position and locked, pt resting comfortably now, call light within reach, all needs met at this time. Interventions will be executed per plan of care

## 2024-03-13 NOTE — PROGRESS NOTES
"Progress Note:    S: Doing well  Walked in [facility] this AM  Pain controlled    O:  Recent Labs     03/12/24  0530   WBC 9.6   RBC 3.73*   HEMOGLOBIN 11.1*   HEMATOCRIT 33.8*   MCV 90.6   MCH 29.8   MCHC 32.8   RDW 57.9*   PLATELETCT 215   MPV 11.8     Recent Labs     03/12/24  0530   SODIUM 136   POTASSIUM 4.3   CHLORIDE 102   CO2 25   GLUCOSE 108*   BUN 15   CREATININE 0.59   CALCIUM 8.5         Current Facility-Administered Medications   Medication Dose    docusate sodium (Colace) capsule 100 mg  100 mg    Pharmacy Consult Request ...Pain Management Review 1 Each  1 Each    ondansetron (Zofran) syringe/vial injection 4 mg  4 mg    diphenhydrAMINE (Benadryl) injection 25 mg  25 mg    haloperidol lactate (Haldol) injection 1 mg  1 mg    scopolamine (Transderm-Scop) patch 1 Patch  1 Patch    enoxaparin (Lovenox) inj 40 mg  40 mg    acetaminophen (Tylenol) tablet 1,000 mg  1,000 mg    Followed by    [START ON 3/16/2024] acetaminophen (Tylenol) tablet 1,000 mg  1,000 mg    ketorolac (Toradol) 15 MG/ML injection 15 mg  15 mg    Followed by    [START ON 3/14/2024] ibuprofen (Motrin) tablet 800 mg  800 mg    oxyCODONE immediate-release (Roxicodone) tablet 5 mg  5 mg    Or    oxyCODONE immediate-release (Roxicodone) tablet 10 mg  10 mg    Or    HYDROmorphone (Dilaudid) injection 0.5 mg  0.5 mg    levothyroxine (Synthroid) tablet 75 mcg  75 mcg    omeprazole (PriLOSEC) capsule 20 mg  20 mg    valACYclovir (Valtrex) caplet 1,000 mg  1,000 mg    venlafaxine XR (Effexor XR) capsule 37.5 mg  37.5 mg    prochlorperazine (Compazine) injection 10 mg  10 mg       PE:  /77   Pulse 68   Temp 36.3 °C (97.3 °F) (Temporal)   Resp 16   Ht 1.676 m (5' 6\")   Wt 67 kg (147 lb 11.3 oz)   SpO2 91%     Intake/Output Summary (Last 24 hours) at 3/13/2024 1016  Last data filed at 3/12/2024 2042  Gross per 24 hour   Intake 200 ml   Output --   Net 200 ml       Wound dry  Heart regular    Rads:  EC-LILIAN W/O CONT   Final Result    "   DX-CHEST-LIMITED (1 VIEW)   Final Result      Postoperative changes of the chest. Hazy patchy right pulmonary opacity could be atelectasis, edema and/or contusion.      Small right pneumothorax with right chest tube in place.      Previously seen mediastinal/left hilar mass is no longer well visualized radiographically.          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  [facility]

## 2024-03-13 NOTE — CARE PLAN
Problem: Pain - Standard  Goal: Alleviation of pain or a reduction in pain to the patient’s comfort goal  3/13/2024 1453 by Kathy Frey R.N.  Outcome: Progressing  3/13/2024 1452 by Kathy Frey R.N.  Outcome: Progressing     Problem: Knowledge Deficit - Standard  Goal: Patient and family/care givers will demonstrate understanding of plan of care, disease process/condition, diagnostic tests and medications  3/13/2024 1453 by Kathy Frey R.N.  Outcome: Progressing  3/13/2024 1452 by Kathy Frey R.N.  Outcome: Progressing     Problem: Fall Risk  Goal: Patient will remain free from falls  Outcome: Progressing     Problem: Nutrition  Goal: Patient's nutritional and fluid intake will be adequate or improve  Outcome: Progressing     Problem: Mobility  Goal: Patient's capacity to carry out activities will improve  Outcome: Progressing     The patient is Stable - Low risk of patient condition declining or worsening    Shift Goals  Clinical Goals: ambulation, pain management, BM  Patient Goals: pain management, mobility  Family Goals: POC    Progress made toward(s) clinical / shift goals: Patient updated on plan of care. Patient verbalized understanding. Patient medicated per MAR for pain with relief. Patient tolerates ambulation to bathroom and chair well. Diet advanced from clear liquid to regular.    Patient is not progressing towards the following goals:

## 2024-03-14 ENCOUNTER — APPOINTMENT (OUTPATIENT)
Dept: RADIOLOGY | Facility: MEDICAL CENTER | Age: 64
DRG: 165 | End: 2024-03-14
Attending: SURGERY
Payer: COMMERCIAL

## 2024-03-14 ENCOUNTER — APPOINTMENT (OUTPATIENT)
Dept: HEMATOLOGY ONCOLOGY | Facility: MEDICAL CENTER | Age: 64
End: 2024-03-14
Payer: COMMERCIAL

## 2024-03-14 VITALS
TEMPERATURE: 97.7 F | RESPIRATION RATE: 18 BRPM | HEIGHT: 66 IN | WEIGHT: 147.71 LBS | HEART RATE: 65 BPM | OXYGEN SATURATION: 97 % | DIASTOLIC BLOOD PRESSURE: 86 MMHG | SYSTOLIC BLOOD PRESSURE: 139 MMHG | BODY MASS INDEX: 23.74 KG/M2

## 2024-03-14 PROCEDURE — 97116 GAIT TRAINING THERAPY: CPT

## 2024-03-14 PROCEDURE — A9270 NON-COVERED ITEM OR SERVICE: HCPCS | Performed by: SURGERY

## 2024-03-14 PROCEDURE — 700111 HCHG RX REV CODE 636 W/ 250 OVERRIDE (IP): Performed by: SURGERY

## 2024-03-14 PROCEDURE — 700102 HCHG RX REV CODE 250 W/ 637 OVERRIDE(OP): Performed by: SURGERY

## 2024-03-14 PROCEDURE — 71045 X-RAY EXAM CHEST 1 VIEW: CPT

## 2024-03-14 RX ADMIN — KETOROLAC TROMETHAMINE 15 MG: 15 INJECTION, SOLUTION INTRAMUSCULAR; INTRAVENOUS at 04:03

## 2024-03-14 RX ADMIN — ACETAMINOPHEN 1000 MG: 500 TABLET, FILM COATED ORAL at 11:31

## 2024-03-14 RX ADMIN — ACETAMINOPHEN 1000 MG: 500 TABLET, FILM COATED ORAL at 04:03

## 2024-03-14 RX ADMIN — VALACYCLOVIR HYDROCHLORIDE 1000 MG: 500 TABLET, FILM COATED ORAL at 04:02

## 2024-03-14 RX ADMIN — DOCUSATE SODIUM 100 MG: 100 CAPSULE, LIQUID FILLED ORAL at 04:03

## 2024-03-14 RX ADMIN — OMEPRAZOLE 20 MG: 20 CAPSULE, DELAYED RELEASE ORAL at 04:02

## 2024-03-14 RX ADMIN — VENLAFAXINE HYDROCHLORIDE 37.5 MG: 37.5 CAPSULE, EXTENDED RELEASE ORAL at 07:48

## 2024-03-14 RX ADMIN — LEVOTHYROXINE SODIUM 75 MCG: 0.07 TABLET ORAL at 04:02

## 2024-03-14 ASSESSMENT — GAIT ASSESSMENTS
GAIT LEVEL OF ASSIST: SUPERVISED
DEVIATION: BRADYKINETIC
DISTANCE (FEET): 500

## 2024-03-14 ASSESSMENT — PAIN DESCRIPTION - PAIN TYPE
TYPE: ACUTE PAIN;SURGICAL PAIN
TYPE: ACUTE PAIN

## 2024-03-14 NOTE — PROGRESS NOTES
Pt transported to [facility] by [facility] staff with belongings and home medications without incident.

## 2024-03-14 NOTE — PROGRESS NOTES
Report received from rosita RN  Pt AAOx4, Alert. Responds appropriately  RA  Pain managed per MAR, resting comfortably.  +void, +flatus  Tolerating diet, no n/v  Ambulating in hallway, steady gait.   Calls appropriately for assistance  SCDs on     Skin: Right IJ, sternal silver island dressing in place. CDI     POC reviewed, education provided. Bed locked and in low position, pt instructed to call for assistance. Comprehension verbalized. Call light within reach, all needs met at this time.

## 2024-03-14 NOTE — PROGRESS NOTES
Approx 1030: CVC removed per order. Tip intact. Pressure applied for eight minutes, dressing completed per protocol. Patient instructed to lie flat for approx 30 minutes.     Patient up to bathroom after lying flat for approx 30 minutes, no bleeding or drainage noted on dressing.

## 2024-03-14 NOTE — PROGRESS NOTES
Assumed care of pt at 0715. Bedside report received. Pt a&o x4, VSS, Assessment completed. Resting comfortably in chair with call light, bedside table and breakfast tray in reach. Pt states 0/10 pain. Pt declines interventions at this time. Side rails up x3. Instructed to use call light when needing assistance, pt verbalized understanding. Bed alarm n/a, bed in low position. Will continue to monitor.

## 2024-03-14 NOTE — THERAPY
Physical Therapy   Daily Treatment     Patient Name: Lizy Saleem  Age:  63 y.o., Sex:  female  Medical Record #: [id number]  Today's Date: 3/14/2024     Precautions  Precautions: Fall Risk;Sternal Precautions (See Comments)    Assessment    Pt seen for PT treatment session. Demonstrates considerably improved balance, gait mechanics and activity tolerance. Ambulated 500 ft without AD and negotiated 2x2 steps with SPV.  Receptive to education regarding goals for mobility upon DC home and progression of activity tolerance. Will have support of friend upon DC home. No concerns with DC home at this time.     Plan  DC from acute PT, goals met    DC Equipment Recommendations: None  Discharge Recommendations: Anticipate that the patient will have no further physical therapy needs after discharge from the hospital    Objective     03/14/24 0944   Precautions   Precautions Fall Risk;Sternal Precautions (See Comments)   Vitals   O2 Delivery Device None - Room Air   Pain 0 - 10 Group   Therapist Pain Assessment During Activity;Nurse Notified  (minimal pain)   Cognition    Cognition / Consciousness WDL   Level of Consciousness Alert   Comments pleasant and receptive to PT education   Strength Lower Body   Lower Body Strength  WDL   Sensation Lower Body   Lower Extremity Sensation   WDL   Other Treatments   Other Treatments Provided Education regarding goals for mobility/ambulation upon DC home, review of sternal precautions and progression of activity upon DC home   Balance   Sitting Balance (Static) Good   Sitting Balance (Dynamic) Good   Standing Balance (Static) Good   Standing Balance (Dynamic) Good   Weight Shift Sitting Good   Weight Shift Standing Good   Skilled Intervention Verbal Cuing   Comments w/o AD   Bed Mobility    Comments in chair pre/post PT session. reports good awareness of log roll for bed mobility   Gait Analysis   Gait Level Of Assist Supervised   Assistive Device None   Distance (Feet) 500   #  of Times Distance was Traveled 1   Deviation Bradykinetic  (dec step length)   # of Stairs Climbed 2  (x2 trails without railng support)   Level of Assist with Stairs Supervised   Weight Bearing Status 3   Skilled Intervention Verbal Cuing   Comments naturally guarded gait out of caution   Functional Mobility   Sit to Stand Supervised   Bed, Chair, Wheelchair Transfer Supervised   Short Term Goals    Short Term Goal # 1 Pt will ambulate 200 ft w/ LRAD and SPV in 6 visits to [facility] improved activity tolerance   Goal Outcome # 1 Goal met   Short Term Goal # 2 pt will complete B log roll with HOB flat and SPV, maintaining sternal precautions in 6 visits.   Goal Outcome # 2   (NT)   Short Term Goal # 3 pt will complete 1 step with LRAD and SPV in 6 visits to [facility] ability to get inside home.   Goal Outcome # 3 Goal met   Short Term Goal # 4 Pt will complete stand step transfer with LRAD and SPV in 6 visits to [facility] improved independence.   Goal Outcome # 4 Goal met   Physical Therapy Treatment Plan   Physical Therapy Treatment Plan Modify Current Treatment Plan   Reason For Discharge Discharge Secondary to Goals Met   Anticipated Discharge Equipment and Recommendations   DC Equipment Recommendations None   Discharge Recommendations Anticipate that the patient will have no further physical therapy needs after discharge from the hospital

## 2024-03-14 NOTE — DISCHARGE SUMMARY
Discharge Summary    CHIEF COMPLAINT ON ADMISSION  Thymoma, lung mass    Reason for Admission  Removal thymoma and lung mass    Admission Date  3/11/2024    CODE STATUS  Full Code    HPI & HOSPITAL COURSE  This is a 63 y.o. female here with an incidentally found thymoma. She also has a right upper lobe nodule. She underwent removal of both by median sternotomy. No postoperative complications. 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       Therefore, she is discharged in good and stable condition to home with close outpatient follow-up.    The patient met 2-midnight criteria for an inpatient stay at the time of discharge.    Discharge Date  3/14/24    FOLLOW UP ITEMS POST DISCHARGE  Office appointment    DISCHARGE DIAGNOSES  Active Problems:    Thymoma (POA: Yes)  Resolved Problems:    * No resolved hospital problems. *      FOLLOW UP  Future Appointments   Date Time Provider Department Center   3/29/2024  1:00 PM [facility]   4/10/2024  8:50 AM Sanjuana Talavera M.D. [facility] None   4/15/2024 10:00 AM [facility] [place]     No follow-up provider specified.    MEDICATIONS ON DISCHARGE     Medication List        ASK your doctor about these medications        Instructions   B COMPLEX PO   Take 1 Tablet by mouth every morning.  Dose: 1 Tablet     CALCIUM CITRATE PLUS/MAGNESIUM PO   Take 1 Tablet by mouth every morning.  Dose: 1 Tablet     Iron 18 MG Tbcr   Take 1 Tablet by mouth every morning.  Dose: 1 Tablet     levothyroxine 75 MCG Tabs  Commonly known as: Synthroid   TAKE 1 TABLET BY MOUTH EVERY DAY IN THE MORNING ON AN EMPTY STOMACH  Dose: 75 mcg     Non Formulary Request   C-progesterone 60 mg/gm CR - apply 4 clicks topically daily     NON SPECIFIED   C-E2/E3 25 mg/mlTake 0.1ml SL daily     OMEGA 3 PO   Take 1 Tablet by mouth every morning.  Dose: 1 Tablet     omeprazole 20 MG delayed-release capsule  Commonly known as: PriLOSEC   Take  20 mg by mouth every morning.  Dose: 20 mg     PRO-BIOTIC BLEND PO   Take 1 Tablet by mouth every morning.  Dose: 1 Tablet     valacyclovir 1 GM Tabs  Commonly known as: Valtrex   TAKE 1 TABLET BY MOUTH EVERY DAY     venlafaxine XR 37.5 MG Cp24  Commonly known as: Effexor XR   Doctor's comments: Patient requesting Brand name  Take 1 Capsule by mouth every day.  Dose: 37.5 mg     Vitamin C 1000 MG Tabs   Take 1,000 mg by mouth 2 times a day.  Dose: 1,000 mg              Allergies  No Known Allergies    DIET  Orders Placed This Encounter   Procedures    Diet Order Diet: Regular     Encourage carbohydrate containing liquids in PACU (milk, juice, or Impact).     Standing Status:   Standing     Number of Occurrences:   1     Order Specific Question:   ERAS     Answer:   Yes     Order Specific Question:   Diet:     Answer:   Regular [1]       ACTIVITY  As tolerated.  10-lb lifting restriction    CONSULTATIONS  None    PROCEDURES  Removal thymoma and lung mass    LABORATORY  Lab Results   Component Value Date    SODIUM 136 03/12/2024    POTASSIUM 4.3 03/12/2024    CHLORIDE 102 03/12/2024    CO2 25 03/12/2024    GLUCOSE 108 (H) 03/12/2024    BUN 15 03/12/2024    CREATININE 0.59 03/12/2024        Lab Results   Component Value Date    WBC 9.6 03/12/2024    HEMOGLOBIN 11.1 (L) 03/12/2024    HEMATOCRIT 33.8 (L) 03/12/2024    PLATELETCT 215 03/12/2024

## 2024-03-14 NOTE — DISCHARGE INSTRUCTIONS
Incision Care, Adult  An incision is a cut that a doctor makes in your skin for surgery. Most times, these cuts are closed after surgery. Your cut from surgery may be closed with:  Stitches (sutures).  Staples.  Skin glue.  Skin tape (adhesive) strips.  You may need to go back to your doctor to have stitches or staples taken out. This may happen many days or many weeks after your surgery. You need to take good care of your cut so it does not get infected. Follow instructions from your doctor about how to care for your cut.  Supplies needed:  Soap and water.  A clean hand towel.  Wound cleanser.  A clean bandage (dressing), if needed.  Cream or ointment, if told by your doctor.  Clean gauze.  How to care for your cut from surgery  Cleaning your cut  Ask your doctor how to clean your cut. You may need to:  Wear medical gloves.  Use mild soap and water, or a wound cleanser.  Use a clean gauze to pat your cut dry after you clean it.  Changing your bandage  Wash your hands with soap and water for at least 20 seconds before and after you change your bandage. If you cannot use soap and water, use hand .  Do not usedisinfectants or antiseptics, such as rubbing alcohol, to clean your wound unless told by your doctor.  Change your bandage as told by your doctor.  Leavestitches or skin glue in place for at least 2 weeks.  Leave tape strips alone unless you are told to take them off. You may trim the edges of the tape strips if they curl up.  Put a cream or ointment on your cut. Do this only as told.  Cover your cut with a clean bandage.  Ask your doctor when you can leave your cut uncovered.  Checking for infection  Check your cut area every day for signs of infection. Check for:  More redness, swelling, or pain.  More fluid or blood.  New warmth.  Hardness or a new rash around the incision.  Pus or a bad smell.    Follow these instructions at home  Medicines  Take over-the-counter and prescription medicines only as  told by your doctor.  If you were prescribed an antibiotic medicine, cream, or ointment, use it as told by your doctor. Do not stop using the antibiotic even if you start to feel better.  Eating and drinking  Eat foods that have a lot of certain nutrients, such as protein, vitamin A, and vitamin C. These foods help your cut heal.  Foods rich in protein include meat, fish, eggs, dairy, beans, nuts, and protein drinks.  Foods rich in vitamin A include carrots and dark green, leafy vegetables.  Foods rich in vitamin C include citrus fruits, tomatoes, broccoli, and peppers.  Drink enough fluid to keep your pee (urine) pale yellow.  General instructions    Do not take baths, swim, or use a hot tub. Ask your doctor about taking showers or sponge baths.  Limit movement around your cut. This helps with healing.  Try not to strain, lift, or exercise for the first 2 weeks, or for as long as told by your doctor.  Return to your normal activities as told by your doctor. Ask your doctor what activities are safe for you.  Do not scratch, scrub, or pick at your cut. Keep it covered as told by your doctor.  Protect your cut from the sun when you are outside for the first 6 months, or for as long as told by your doctor. Cover up the scar area or put on sunscreen that has an SPF of at least 30.  Do not use any products that contain nicotine or tobacco, such as cigarettes, e-cigarettes, and chewing tobacco. These can delay cut healing. If you need help quitting, ask your doctor.  Keep all follow-up visits.  Contact a doctor if:  You have any of these signs of infection around your cut:  More redness, swelling, or pain.  More fluid or blood.  New warmth or hardness.  Pus or a bad smell.  A new rash.  You have a fever.  You feel like you may vomit (nauseous).  You vomit.  You are dizzy.  Your stitches, staples, skin glue, or tape strips come undone.  Your cut gets bigger.  You have a fever.  Get help right away if:  Your cut bleeds  through your bandage, and bleeding does not stop with gentle pressure.  Your cut opens up and comes apart.  These symptoms may be an emergency. Do not wait to see if the symptoms will go away. Get medical help right away. Call your local emergency services (911 in the [place] Do not drive yourself to the hospital.  Summary  Follow instructions from your doctor about how to care for your cut.  Wash your hands with soap and water for at least 20 seconds before and after you change your bandage. If you cannot use soap and water, use hand .  Check your cut area every day for signs of infection.  Keep all follow-up visits.  This information is not intended to replace advice given to you by your health care provider. Make sure you discuss any questions you have with your health care provider.  Document Revised: 03/21/2022 Document Reviewed: 03/21/2022  Elsevier Patient Education © 2023 [employer]

## 2024-03-14 NOTE — DISCHARGE PLANNING
Case Management Discharge Planning    Admission Date: 3/11/2024  GMLOS: 2.2  ALOS: 3    6-Clicks ADL Score: 19  6-Clicks Mobility Score: 17  PT and/or OT Eval ordered: Yes  Post-acute Referrals Ordered: Yes  Post-acute Choice Obtained: Yes  Has referral(s) been sent to post-acute provider:  Yes      Anticipated Discharge Dispo: Discharge Disposition: D/T to home under [facility] care in anticipation of covered skilled care (06)    DME Needed: No    Action(s) Taken: Chart review completed. Patient discussed during IDT rounds.     Per chart review, patient to discharge home today.    RNCM reached out to provider re: obtain DME order for FWW per PT recs.     Per provider, RNCM to reach out to PT to see if FWW is still indicated (last note from PT 3/12).    RNCM reached out to PT; per PT, patient does not need FWW; patient able to discharge home home health ([facility]).     Escalations Completed: None    Medically Clear: Yes    Next Steps: CM to follow for any discharge planning needs/barriers.     Barriers to Discharge: None

## 2024-03-14 NOTE — CARE PLAN
The patient is Stable - Low risk of patient condition declining or worsening    Shift Goals  Clinical Goals: ambulation, pain mgmt  Patient Goals: rest  Family Goals: POC    Progress made toward(s) clinical / shift goals:    Problem: Pain - Standard  Goal: Alleviation of pain or a reduction in pain to the patient’s comfort goal  Description: Target End Date:  Prior to discharge or change in level of care    Document on Vitals flowsheet    1.  Document pain using the appropriate pain scale per order or unit policy  2.  Educate and implement non-pharmacologic comfort measures (i.e. relaxation, distraction, massage, cold/heat therapy, etc.)  3.  Pain management medications as ordered  4.  Reassess pain after pain med administration per policy  5.  If opiods administered assess patient's response to pain medication is appropriate per POSS sedation scale  6.  Follow pain management plan developed in collaboration with patient and interdisciplinary team (including palliative care or pain specialists if applicable)  Outcome: Progressing     Problem: Knowledge Deficit - Standard  Goal: Patient and family/care givers will demonstrate understanding of plan of care, disease process/condition, diagnostic tests and medications  Description: Target End Date:  1-3 days or as soon as patient condition allows    Document in Patient Education    1.  Patient and family/caregiver oriented to unit, equipment, visitation policy and means for communicating concern  2.  Complete/review Learning Assessment  3.  Assess knowledge level of disease process/condition, treatment plan, diagnostic tests and medications  4.  Explain disease process/condition, treatment plan, diagnostic tests and medications  Outcome: Progressing       Patient is not progressing towards the following goals:

## 2024-03-15 ENCOUNTER — HOSPITAL ENCOUNTER (OUTPATIENT)
Dept: HEMATOLOGY ONCOLOGY | Facility: MEDICAL CENTER | Age: 64
End: 2024-03-15
Attending: STUDENT IN AN ORGANIZED HEALTH CARE EDUCATION/TRAINING PROGRAM
Payer: COMMERCIAL

## 2024-03-15 DIAGNOSIS — C34.91 ADENOCARCINOMA OF RIGHT LUNG (HCC): ICD-10-CM

## 2024-03-15 DIAGNOSIS — D49.89 THYMOMA: ICD-10-CM

## 2024-03-15 PROBLEM — C34.90 ADENOCARCINOMA, LUNG (HCC): Status: ACTIVE | Noted: 2024-03-15

## 2024-03-15 PROCEDURE — 99214 OFFICE O/P EST MOD 30 MIN: CPT | Mod: 95 | Performed by: STUDENT IN AN ORGANIZED HEALTH CARE EDUCATION/TRAINING PROGRAM

## 2024-03-15 ASSESSMENT — ENCOUNTER SYMPTOMS
HEARTBURN: 0
SPUTUM PRODUCTION: 0
FOCAL WEAKNESS: 0
BRUISES/BLEEDS EASILY: 0
TREMORS: 0
DEPRESSION: 0
HEADACHES: 0
NAUSEA: 0
DIZZINESS: 0
WEIGHT LOSS: 0
SORE THROAT: 0
BLURRED VISION: 0
MEMORY LOSS: 0
NECK PAIN: 0
FEVER: 0
ABDOMINAL PAIN: 0
TINGLING: 0
SENSORY CHANGE: 0
SHORTNESS OF BREATH: 0
COUGH: 0
VOMITING: 0
WHEEZING: 0
CHILLS: 0
PALPITATIONS: 0
ORTHOPNEA: 0

## 2024-03-15 NOTE — PROGRESS NOTES
Consult Note: Hematology/Oncology     Referring Provider: Sanjuana Talavera MD  Primary Care:  Kathy Talavera M.D.    Chief Complaint   Patient presents with    Follow-Up     Thymoma       Current Treatment: None    Prior Treatment: None    This evaluation was conducted via Zoom using secure and encrypted videoconferencing technology. The patient was in their home in the [facility].    The patient's identity was confirmed and verbal consent was obtained for this virtual visit.    Subjective:   History of Presenting Illness:  Lizy Saleem is a 63 y.o. 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  She lost her mother to lung cancer  in May 2023.      She and her  got COVID19 2023.  At that time she noted that she lost her appetite.  She subsequently lost her  to COVID19 PNA in 11/2023.  She developed chronic diarrhea and loss of appetite around that time.  She has been losing weight.  This prompted her to see Dr. Lewis (GI) on December 28, 2023 for her diarrhea and loss of appetite who ordered a CT AP.  A mass in her mediastinum was seen by the radiologist.  There is no concern for malignancy in her abdomen and pelvis.  As such a CT Chest was subsequently ordered on December 29 which shows a 6.8 x 9.9 x 13 cm lobulated mass within the left anterior mediastinum.  Additionally there was a 1.2 cm ill-defined opacity in the right upper lobe.    She had a colonoscopy and EGD on 1/11/24.  Only findings was Barretts esophagitis, and she was placed on omeprazole.     She subsequently had a biopsy of the mediastinum on January 24 which confirms thymoma.    Patient's case was presented in tumor board this morning.  It was decided that she would benefit from resection of the thymoma as well as a wedge resection of her right upper lobe lung mass.    Patient underwent thymectomy as well as a right upper lobe lung mass resection.    She  reports that she did very well after the surgery and is surprised how quickly she has recovered.    She is here to discuss the results of her pathology and steps moving forward.    Past Medical History:   Diagnosis Date    Acquired hypothyroidism 11/01/2018    Anesthesia     PONV    Anxiety reaction 11/01/2018    Barnard's esophagus     Bowel habit changes     diarrhea    Bronchitis 2004    Lasted a month    Cancer ([facility]) 2024    thymoma    Chronic diarrhea 11/18/2020    Chronic midline low back pain without sciatica 11/18/2020    Family history of abdominal aortic aneurysm 02/08/2019    Herpes labialis 02/08/2019    Evans's neuroma of left foot 11/08/2018    PONV (postoperative nausea and vomiting) 2000 and 2003    For bunionectomies    Sleep apnea 2012    Did sleep study with dentist wear dental appliance    Snoring         Past Surgical History:   Procedure Laterality Date    STERNOTMY WITH BILATERAL WEDGE Right 3/11/2024    Procedure: STERNOTOMY, RESECTION THYMOMA, WEDGE RESECTION RIGHT UPPER LOBE;  Surgeon: John H Ganser, M.D.;  Location: SURGERY [facility];  Service: General    HYSTEROSCOPY WITH VIDEO DIAGNOSTIC  10/23/2015    Procedure: HYSTEROSCOPY WITH VIDEO DIAGNOSTIC;  Surgeon: Lesli Frias M.D.;  Location: SURGERY [facility];  Service:     DILATION AND CURETTAGE  10/23/2015    Procedure: DILATION AND CURETTAGE;  Surgeon: Lesli Frias M.D.;  Location: [facility];  Service:     BUNIONECTOMY Right 01/01/2003    BUNIONECTOMY Left 01/01/2000    ARTHROSCOPY, KNEE Left 01/01/1992    OTHER  1991    Left knee meniscus repair       Social History     Tobacco Use    Smoking status: Never    Smokeless tobacco: Never   Vaping Use    Vaping Use: Never used   Substance Use Topics    Alcohol use: Not Currently    Drug use: No        Family History   Problem Relation Age of Onset    Breast Cancer Paternal Aunt     Cancer Father         Prostate    Hyperlipidemia Father     Hypertension  Father     Other Father         multiple aneurisms    Thyroid Mother     Cancer Mother         Melanoma left eye and on leg    Lung Disease Mother     Cancer Cousin     Cancer Maternal Uncle         Stomach cancer    Cancer Paternal Aunt         Masectomy    Cancer Sister         Thyroid cancer       Allergies   Allergen Reactions    Latex Rash     Rash with latex bandages       Current Outpatient Medications   Medication Sig Dispense Refill    Ferrous Fumarate (IRON) 18 MG Tab CR Take 1 Tablet by mouth every morning.      omeprazole (PRILOSEC) 20 MG delayed-release capsule Take 20 mg by mouth every morning.      venlafaxine XR (EFFEXOR XR) 37.5 MG CAPSULE SR 24 HR Take 1 Capsule by mouth every day. (Patient taking differently: Take 37.5 mg by mouth every morning.) 90 Capsule 1    levothyroxine (SYNTHROID) 75 MCG Tab TAKE 1 TABLET BY MOUTH EVERY DAY IN THE MORNING ON AN EMPTY STOMACH 90 Tablet 0    B Complex Vitamins (B COMPLEX PO) Take 1 Tablet by mouth every morning.      Ascorbic Acid (VITAMIN C) 1000 MG Tab Take 1,000 mg by mouth 2 times a day.      Omega-3 Fatty Acids (OMEGA 3 PO) Take 1 Tablet by mouth every morning.      Multiple Minerals-Vitamins (CALCIUM CITRATE PLUS/MAGNESIUM PO) Take 1 Tablet by mouth every morning.      Probiotic Product (PRO-BIOTIC BLEND PO) Take 1 Tablet by mouth every morning.      valacyclovir (VALTREX) 1 GM Tab TAKE 1 TABLET BY MOUTH EVERY DAY (Patient taking differently: Take 1,000 mg by mouth every morning. TAKE 1 TABLET BY MOUTH EVERY DAY) 90 Tablet 3    NON SPECIFIED C-E2/E3 25 mg/mlTake 0.1ml SL daily (Patient taking differently: C-E2/E3 25 mg/mlTake 0.1ml SL daily. Nightly except Sunday.) 5 Each 1    Non Formulary Request C-progesterone 60 mg/gm CR - apply 4 clicks topically daily (Patient taking differently: C-progesterone 60 mg/gm CR - apply 4 clicks topically daily. Nightly except Sunday.) 90 Each 3     No current facility-administered medications for this encounter.        Review of Systems   Constitutional:  Negative for chills, fever, malaise/fatigue and weight loss.   HENT:  Negative for congestion, ear pain, nosebleeds and sore throat.    Eyes:  Negative for blurred vision.   Respiratory:  Negative for cough, sputum production, shortness of breath and wheezing.    Cardiovascular:  Negative for chest pain, palpitations, orthopnea and leg swelling.   Gastrointestinal:  Negative for abdominal pain, heartburn, nausea and vomiting.   Genitourinary:  Negative for dysuria, frequency and urgency.   Musculoskeletal:  Negative for neck pain.   Neurological:  Negative for dizziness, tingling, tremors, sensory change, focal weakness and headaches.   Endo/Heme/Allergies:  Does not bruise/bleed easily.   Psychiatric/Behavioral:  Negative for depression, memory loss and suicidal ideas.    All other systems reviewed and are negative.      Problem list, medications, and allergies reviewed by myself today in Epic.     Objective:     There were no vitals filed for this visit.      DESC; KARNOFSKY SCALE WITH ECOG EQUIVALENT: 90, Able to carry on normal activity; minor signs or symptoms of disease (ECOG equivalent 0)    DISTRESS LEVEL: no acute distress    Physical Exam  Not done due to virtual visit    Labs:   Most recent labs reviewed.     CBC CMP TSH reviewed without concerns    Imaging:   Most recent images below have been independently reviewed by me.      CT CAP    1.  6.8 x 9.9 x 13.0 cm lobulated mass within the left anterior mediastinum. Differential considerations include lymphoma, thymoma, teratoma.     2.  1.2 cm ill-defined opacity right upper lobe indeterminate.     3.  No pleural effusions.    Pathology:  FINAL DIAGNOSIS:     A. Lung/mediastinal biopsy:          Core biopsy consistent with a thymoma, favoring AB type.     3/11/24  A. Thymoma:          Thymoma, type AB.   B. Right upper lobe lung mass:          Lung adenocarcinoma with lepidic and acinar features.          A  carcinoid tumorlet, 0.48 cm in greatest dimension with 1           mitoses per 2 mm2, no necrosis and Ki-67 of < 3%, is noted           within the lepidic area of the adenocarcinoma.     Assessment/Plan:      Cancer Staging   Adenocarcinoma, lung (HCC)  Staging form: Lung, AJCC 8th Edition  - Clinical stage from 3/11/2024: Stage IA1 (cT1a, cN0, cM0) - Signed by Fabby Holm M.D. on 3/15/2024    Thymoma  Staging form: Thymus, AJCC 8th Edition  - Clinical stage from 1/30/2024: Stage I (cT1a, cN0, cM0) - Signed by Fabby Holm M.D. on 1/30/2024     Ms. Reeves is a 63-year-old female with a recent diagnosis of thymoma s/p thymectomy and RUL adenocarcinoma s/p wedge resection stage 1A1.    Today we discussed the pathological results from her thymectomy as well as her wedge resection of the right upper lobe mass.  We discussed that her thymoma was a type AB with the greatest dimension being almost 13 cm.  There is no lymphovascular invasion and margins were negative.  Thus she has a stage I thymoma.    We then moved on to discuss her right upper lobe mass, which measured 1 cm in greatest dimension with acinar and lipidic histological patterns.  There is also tumorlet that measured 4.8 mm with in this mass.  There is no visceropleural invasion or direct invasion of any structures.  Tumor margins were negative.  Staging was at T1 a P0 equating to a stage Ia 1.    We discussed that surveillance for both thymoma and stage Ia 1 non-small cell lung cancer is CT chest with contrast every 6 months.    Will order this and follow her with imaging.      Plan   -CT chest in 6 months  Follow-up with me shortly after        Any questions and concerns raised by the patient were addressed and answered. Patient denies any further questions.  Patient encouraged to call the office with any concerns or issues.     Fabby Holm M.D.  Hematology/Oncology    26 minutes was spent on this visit

## 2024-03-18 ENCOUNTER — HOME CARE VISIT (OUTPATIENT)
Dept: HOME HEALTH SERVICES | Facility: HOME HEALTHCARE | Age: 64
End: 2024-03-18

## 2024-04-10 ENCOUNTER — OFFICE VISIT (OUTPATIENT)
Dept: SLEEP MEDICINE | Facility: MEDICAL CENTER | Age: 64
End: 2024-04-10
Attending: INTERNAL MEDICINE
Payer: COMMERCIAL

## 2024-04-10 ENCOUNTER — HOSPITAL ENCOUNTER (OUTPATIENT)
Dept: RADIOLOGY | Facility: MEDICAL CENTER | Age: 64
End: 2024-04-10
Attending: OBSTETRICS & GYNECOLOGY
Payer: COMMERCIAL

## 2024-04-10 VITALS
HEIGHT: 66 IN | HEART RATE: 64 BPM | WEIGHT: 145 LBS | OXYGEN SATURATION: 100 % | SYSTOLIC BLOOD PRESSURE: 122 MMHG | DIASTOLIC BLOOD PRESSURE: 66 MMHG | BODY MASS INDEX: 23.3 KG/M2

## 2024-04-10 DIAGNOSIS — R53.83 OTHER FATIGUE: ICD-10-CM

## 2024-04-10 DIAGNOSIS — N95.1 SYMPTOMATIC MENOPAUSAL OR FEMALE CLIMACTERIC STATES: ICD-10-CM

## 2024-04-10 DIAGNOSIS — F32.A DEPRESSION, UNSPECIFIED DEPRESSION TYPE: ICD-10-CM

## 2024-04-10 DIAGNOSIS — G47.00 PERSISTENT DISORDER OF INITIATING OR MAINTAINING SLEEP: ICD-10-CM

## 2024-04-10 DIAGNOSIS — D49.89 THYMOMA: ICD-10-CM

## 2024-04-10 DIAGNOSIS — Z98.890 POST-OPERATIVE STATE: ICD-10-CM

## 2024-04-10 DIAGNOSIS — R19.7 DIARRHEA OF PRESUMED INFECTIOUS ORIGIN: ICD-10-CM

## 2024-04-10 DIAGNOSIS — R91.1 LUNG NODULE: ICD-10-CM

## 2024-04-10 DIAGNOSIS — E55.9 AVITAMINOSIS D: ICD-10-CM

## 2024-04-10 PROCEDURE — 3074F SYST BP LT 130 MM HG: CPT | Performed by: INTERNAL MEDICINE

## 2024-04-10 PROCEDURE — 99214 OFFICE O/P EST MOD 30 MIN: CPT | Performed by: INTERNAL MEDICINE

## 2024-04-10 PROCEDURE — 76830 TRANSVAGINAL US NON-OB: CPT

## 2024-04-10 PROCEDURE — 99212 OFFICE O/P EST SF 10 MIN: CPT | Performed by: INTERNAL MEDICINE

## 2024-04-10 PROCEDURE — 3078F DIAST BP <80 MM HG: CPT | Performed by: INTERNAL MEDICINE

## 2024-04-10 RX ORDER — THYROID 60 MG
TABLET ORAL
COMMUNITY
Start: 2024-03-18

## 2024-04-10 ASSESSMENT — ENCOUNTER SYMPTOMS
FOCAL WEAKNESS: 0
SPEECH CHANGE: 0
SPUTUM PRODUCTION: 0
COUGH: 0
BLURRED VISION: 0
VOMITING: 0
FEVER: 0
EYE REDNESS: 0
SINUS PAIN: 0
EYE DISCHARGE: 0
PALPITATIONS: 0
DIAPHORESIS: 0
WEIGHT LOSS: 0
DIZZINESS: 0
CONSTIPATION: 0
BACK PAIN: 0
DOUBLE VISION: 0
DIARRHEA: 0
DEPRESSION: 0
PND: 0
PHOTOPHOBIA: 0
SORE THROAT: 0
HEARTBURN: 0
HEMOPTYSIS: 0
CLAUDICATION: 0
WEAKNESS: 0
CHILLS: 0
EYE PAIN: 0
MYALGIAS: 0
SHORTNESS OF BREATH: 0
ORTHOPNEA: 0
NECK PAIN: 0
NAUSEA: 0
ABDOMINAL PAIN: 0
STRIDOR: 0
HEADACHES: 0
FALLS: 0
WHEEZING: 0
TREMORS: 0

## 2024-04-10 ASSESSMENT — FIBROSIS 4 INDEX: FIB4 SCORE: 1.39

## 2024-04-10 NOTE — PROGRESS NOTES
Chief Complaint   Patient presents with    Follow-Up     LAST SEEN 1/10/24    Results     PFT 24  CT CHEST 24  CXR 3/13/24, 3/11/24, 24  BIOPSY 24         HPI: This patient is a 63 y.o. female whom is followed in our clinic for mediastinal mass and RUL lung nodule last seen by me on 1/10/24.  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 She is a life-long non-smoker. She has a family hx of thyroid d/o including cancer (sister). No known occupational or environmental exposures. She was referred to me for incidental finding of 13 cm x 7 cm anterior mediastinal mass with passive compression on L lung. Her   of complications of hardware infection and covid 19 last fall and the pt experienced a 30 lb weight loss. CT abd/pelvis from 23 showed no concerning findings with respect to GI tract/abdomen/pelvis however it did catch the inferior portion of a large mediastinal mass which was f/u with CT chest on 23. This showed left sided anterior mediastinal mass up to 13 cm in max dimension w/o bronchial or vascular compromise. She does have 1.2 cm GGO in the inferior RUL. No LAD. She denies f/c, night sweats. No chest pain. No edema. No SOB. No cough. She was referred to [facility] oncology and [facility] by Dr. Fragoso with GI and to me by PCP. Since our first visit, she had PFT in February showing normal lung function. She had CT-guided biopsy of mediastinal mass in January which showed thymoma AB type. She then underwent PET also in January which showed FDG update in anterior mediastinal mass but also in RUL nodule. She underwent surgical resection of both the thymoma and RUL nodule last month. She tolerated procedure well and both lesions were removed with clear surgical margins. RUL was c/w adenoCa. She is being followed by oncology with repeat imaging scheduled for September. She was warned that there may be injury to her phrenic nerve after surgery but  overall she fells well, no CP or SOB. Her SpO2 is 100% today.    Past Medical History:   Diagnosis Date    Acquired hypothyroidism 11/01/2018    Anesthesia     PONV    Anxiety reaction 11/01/2018    Barnard's esophagus     Bowel habit changes     diarrhea    Bronchitis 2004    Lasted a month    Cancer ([facility]) 2024    thymoma    Chronic diarrhea 11/18/2020    Chronic midline low back pain without sciatica 11/18/2020    Family history of abdominal aortic aneurysm 02/08/2019    Herpes labialis 02/08/2019    Evans's neuroma of left foot 11/08/2018    PONV (postoperative nausea and vomiting) 2000 and 2003    For bunionectomies    Sleep apnea 2012    Did sleep study with dentist wear dental appliance    Snoring        Social History     Socioeconomic History    Marital status:      Spouse name: Not on file    Number of children: Not on file    Years of education: Not on file    Highest education level: Not on file   Occupational History    Not on file   Tobacco Use    Smoking status: Never    Smokeless tobacco: Never   Vaping Use    Vaping Use: Never used   Substance and Sexual Activity    Alcohol use: Not Currently    Drug use: No    Sexual activity: Not Currently     Partners: Male     Comment: , retired from [employer]   Other Topics Concern    Not on file   Social History Narrative    Not on file     Social Determinants of Health     Financial Resource Strain: Not on file   Food Insecurity: Not on file   Transportation Needs: Not on file   Physical Activity: Not on file   Stress: Not on file   Social Connections: Not on file   Intimate Partner Violence: Not on file   Housing Stability: Not on file       Family History   Problem Relation Age of Onset    Breast Cancer Paternal Aunt     Cancer Father         Prostate    Hyperlipidemia Father     Hypertension Father     Other Father         multiple aneurisms    Thyroid Mother     Cancer Mother         Melanoma left eye and on leg    Lung Disease  Mother     Cancer Cousin     Cancer Maternal Uncle         Stomach cancer    Cancer Paternal Aunt         Masectomy    Cancer Sister         Thyroid cancer       Current Outpatient Medications on File Prior to Visit   Medication Sig Dispense Refill    ARMOUR THYROID 60 MG Tab TAKE 1 TABLET BY MOUTH EVERY DAY** NOT COVERED      omeprazole (PRILOSEC) 20 MG delayed-release capsule Take 20 mg by mouth every morning.      levothyroxine (SYNTHROID) 75 MCG Tab TAKE 1 TABLET BY MOUTH EVERY DAY IN THE MORNING ON AN EMPTY STOMACH 90 Tablet 0    valacyclovir (VALTREX) 1 GM Tab TAKE 1 TABLET BY MOUTH EVERY DAY (Patient taking differently: Take 1,000 mg by mouth every morning. TAKE 1 TABLET BY MOUTH EVERY DAY) 90 Tablet 3    Ferrous Fumarate (IRON) 18 MG Tab CR Take 1 Tablet by mouth every morning.      venlafaxine XR (EFFEXOR XR) 37.5 MG CAPSULE SR 24 HR Take 1 Capsule by mouth every day. (Patient taking differently: Take 37.5 mg by mouth every morning.) 90 Capsule 1    B Complex Vitamins (B COMPLEX PO) Take 1 Tablet by mouth every morning.      Ascorbic Acid (VITAMIN C) 1000 MG Tab Take 1,000 mg by mouth 2 times a day.      Omega-3 Fatty Acids (OMEGA 3 PO) Take 1 Tablet by mouth every morning.      Multiple Minerals-Vitamins (CALCIUM CITRATE PLUS/MAGNESIUM PO) Take 1 Tablet by mouth every morning.      Probiotic Product (PRO-BIOTIC BLEND PO) Take 1 Tablet by mouth every morning.      NON SPECIFIED C-E2/E3 25 mg/mlTake 0.1ml SL daily (Patient taking differently: C-E2/E3 25 mg/mlTake 0.1ml SL daily. Nightly except Sunday.) 5 Each 1    Non Formulary Request C-progesterone 60 mg/gm CR - apply 4 clicks topically daily (Patient taking differently: C-progesterone 60 mg/gm CR - apply 4 clicks topically daily. Nightly except Sunday.) 90 Each 3     No current facility-administered medications on file prior to visit.       Latex      ROS:   Review of Systems   Constitutional:  Negative for chills, diaphoresis, fever, malaise/fatigue and  "weight loss.   HENT:  Negative for congestion, ear discharge, ear pain, hearing loss, nosebleeds, sinus pain, sore throat and tinnitus.    Eyes:  Negative for blurred vision, double vision, photophobia, pain, discharge and redness.   Respiratory:  Negative for cough, hemoptysis, sputum production, shortness of breath, wheezing and stridor.    Cardiovascular:  Negative for chest pain, palpitations, orthopnea, claudication, leg swelling and PND.   Gastrointestinal:  Negative for abdominal pain, constipation, diarrhea, heartburn, nausea and vomiting.   Genitourinary:  Negative for dysuria and urgency.   Musculoskeletal:  Negative for back pain, falls, joint pain, myalgias and neck pain.   Skin:  Negative for itching and rash.   Neurological:  Negative for dizziness, tremors, speech change, focal weakness, weakness and headaches.   Endo/Heme/Allergies:  Negative for environmental allergies.   Psychiatric/Behavioral:  Negative for depression.        /66 (BP Location: Right arm, Patient Position: Sitting, BP Cuff Size: Adult)   Pulse 64   Ht 1.676 m (5' 6\")   Wt 65.8 kg (145 lb)   SpO2 100%   Physical Exam  Constitutional:       General: She is not in acute distress.     Appearance: Normal appearance. She is well-developed and normal weight.   HENT:      Head: Normocephalic and atraumatic.      Right Ear: External ear normal.      Left Ear: External ear normal.      Nose: Nose normal. No congestion.      Mouth/Throat:      Mouth: Mucous membranes are moist.      Pharynx: Oropharynx is clear. No oropharyngeal exudate.   Eyes:      General: No scleral icterus.     Extraocular Movements: Extraocular movements intact.      Conjunctiva/sclera: Conjunctivae normal.      Pupils: Pupils are equal, round, and reactive to light.   Neck:      Vascular: No JVD.      Trachea: No tracheal deviation.   Cardiovascular:      Rate and Rhythm: Normal rate and regular rhythm.      Heart sounds: Normal heart sounds. No murmur " heard.     No friction rub. No gallop.   Pulmonary:      Effort: Pulmonary effort is normal. No accessory muscle usage or respiratory distress.      Breath sounds: Normal breath sounds. No wheezing or rales.   Abdominal:      General: There is no distension.      Palpations: Abdomen is soft.      Tenderness: There is no abdominal tenderness.   Musculoskeletal:         General: No tenderness or deformity. Normal range of motion.      Cervical back: Normal range of motion and neck supple.      Right lower leg: No edema.      Left lower leg: No edema.   Lymphadenopathy:      Cervical: No cervical adenopathy.   Skin:     General: Skin is warm and dry.      Findings: No rash.      Nails: There is no clubbing.   Neurological:      Mental Status: She is alert and oriented to person, place, and time.      Cranial Nerves: No cranial nerve deficit.      Gait: Gait normal.   Psychiatric:         Behavior: Behavior normal.       PFTs as reviewed by me personally: as per hPI    Imaging as reviewed by me personally:  as per hPI    Assessment:  1. Lung nodule  PULMONARY FUNCTION TESTS -Test requested: Complete Pulmonary Function Test      2. Thymoma  PULMONARY FUNCTION TESTS -Test requested: Complete Pulmonary Function Test      3. Post-operative state  PULMONARY FUNCTION TESTS -Test requested: Complete Pulmonary Function Test          Plan:  Pathology c/w adenoCa. S/p resection with Clear surgical margins. Plans for surveillance with oncology in 6 mos.  S/p resection. Surveillance imaging as per above.  She has mild decrease in BS LLL. No sxs. Continue to monitor and update PFT in 6 mos.   Return in about 6 months (around 10/10/2024) for PFT at time of f/u.

## 2024-04-12 SDOH — ECONOMIC STABILITY: INCOME INSECURITY: IN THE LAST 12 MONTHS, WAS THERE A TIME WHEN YOU WERE NOT ABLE TO PAY THE MORTGAGE OR RENT ON TIME?: NO

## 2024-04-12 SDOH — ECONOMIC STABILITY: HOUSING INSECURITY
IN THE LAST 12 MONTHS, WAS THERE A TIME WHEN YOU DID NOT HAVE A STEADY PLACE TO SLEEP OR SLEPT IN A SHELTER (INCLUDING NOW)?: NO

## 2024-04-12 SDOH — HEALTH STABILITY: PHYSICAL HEALTH: ON AVERAGE, HOW MANY MINUTES DO YOU ENGAGE IN EXERCISE AT THIS LEVEL?: 60 MIN

## 2024-04-12 SDOH — ECONOMIC STABILITY: FOOD INSECURITY: WITHIN THE PAST 12 MONTHS, THE FOOD YOU BOUGHT JUST DIDN'T LAST AND YOU DIDN'T HAVE MONEY TO GET MORE.: NEVER TRUE

## 2024-04-12 SDOH — ECONOMIC STABILITY: TRANSPORTATION INSECURITY
IN THE PAST 12 MONTHS, HAS LACK OF RELIABLE TRANSPORTATION KEPT YOU FROM MEDICAL APPOINTMENTS, MEETINGS, WORK OR FROM GETTING THINGS NEEDED FOR DAILY LIVING?: NO

## 2024-04-12 SDOH — HEALTH STABILITY: PHYSICAL HEALTH: ON AVERAGE, HOW MANY DAYS PER WEEK DO YOU ENGAGE IN MODERATE TO STRENUOUS EXERCISE (LIKE A BRISK WALK)?: 5 DAYS

## 2024-04-12 SDOH — ECONOMIC STABILITY: TRANSPORTATION INSECURITY
IN THE PAST 12 MONTHS, HAS LACK OF TRANSPORTATION KEPT YOU FROM MEETINGS, WORK, OR FROM GETTING THINGS NEEDED FOR DAILY LIVING?: NO

## 2024-04-12 SDOH — ECONOMIC STABILITY: TRANSPORTATION INSECURITY
IN THE PAST 12 MONTHS, HAS THE LACK OF TRANSPORTATION KEPT YOU FROM MEDICAL APPOINTMENTS OR FROM GETTING MEDICATIONS?: NO

## 2024-04-12 SDOH — ECONOMIC STABILITY: FOOD INSECURITY: WITHIN THE PAST 12 MONTHS, YOU WORRIED THAT YOUR FOOD WOULD RUN OUT BEFORE YOU GOT MONEY TO BUY MORE.: NEVER TRUE

## 2024-04-12 SDOH — ECONOMIC STABILITY: HOUSING INSECURITY: IN THE LAST 12 MONTHS, HOW MANY PLACES HAVE YOU LIVED?: 1

## 2024-04-12 SDOH — HEALTH STABILITY: MENTAL HEALTH
STRESS IS WHEN SOMEONE FEELS TENSE, NERVOUS, ANXIOUS, OR CAN'T SLEEP AT NIGHT BECAUSE THEIR MIND IS TROUBLED. HOW STRESSED ARE YOU?: ONLY A LITTLE

## 2024-04-12 SDOH — ECONOMIC STABILITY: INCOME INSECURITY: HOW HARD IS IT FOR YOU TO PAY FOR THE VERY BASICS LIKE FOOD, HOUSING, MEDICAL CARE, AND HEATING?: NOT HARD AT ALL

## 2024-04-12 ASSESSMENT — SOCIAL DETERMINANTS OF HEALTH (SDOH)
HOW OFTEN DO YOU ATTEND CHURCH OR RELIGIOUS SERVICES?: NEVER
HOW OFTEN DO YOU HAVE A DRINK CONTAINING ALCOHOL: NEVER
WITHIN THE PAST 12 MONTHS, YOU WORRIED THAT YOUR FOOD WOULD RUN OUT BEFORE YOU GOT THE MONEY TO BUY MORE: NEVER TRUE
HOW OFTEN DO YOU GET TOGETHER WITH FRIENDS OR RELATIVES?: MORE THAN THREE TIMES A WEEK
HOW OFTEN DO YOU ATTEND CHURCH OR RELIGIOUS SERVICES?: NEVER
HOW OFTEN DO YOU GET TOGETHER WITH FRIENDS OR RELATIVES?: MORE THAN THREE TIMES A WEEK
IN A TYPICAL WEEK, HOW MANY TIMES DO YOU TALK ON THE PHONE WITH FAMILY, FRIENDS, OR NEIGHBORS?: MORE THAN THREE TIMES A WEEK
DO YOU BELONG TO ANY CLUBS OR ORGANIZATIONS SUCH AS CHURCH GROUPS UNIONS, FRATERNAL OR ATHLETIC GROUPS, OR SCHOOL GROUPS?: YES
HOW OFTEN DO YOU HAVE SIX OR MORE DRINKS ON ONE OCCASION: NEVER
HOW OFTEN DO YOU ATTENT MEETINGS OF THE CLUB OR ORGANIZATION YOU BELONG TO?: 1 TO 4 TIMES PER YEAR
IN A TYPICAL WEEK, HOW MANY TIMES DO YOU TALK ON THE PHONE WITH FAMILY, FRIENDS, OR NEIGHBORS?: MORE THAN THREE TIMES A WEEK
HOW OFTEN DO YOU ATTENT MEETINGS OF THE CLUB OR ORGANIZATION YOU BELONG TO?: 1 TO 4 TIMES PER YEAR
HOW MANY DRINKS CONTAINING ALCOHOL DO YOU HAVE ON A TYPICAL DAY WHEN YOU ARE DRINKING: PATIENT DOES NOT DRINK
HOW HARD IS IT FOR YOU TO PAY FOR THE VERY BASICS LIKE FOOD, HOUSING, MEDICAL CARE, AND HEATING?: NOT HARD AT ALL
DO YOU BELONG TO ANY CLUBS OR ORGANIZATIONS SUCH AS CHURCH GROUPS UNIONS, FRATERNAL OR ATHLETIC GROUPS, OR SCHOOL GROUPS?: YES

## 2024-04-12 ASSESSMENT — LIFESTYLE VARIABLES
HOW OFTEN DO YOU HAVE A DRINK CONTAINING ALCOHOL: NEVER
HOW OFTEN DO YOU HAVE SIX OR MORE DRINKS ON ONE OCCASION: NEVER
SKIP TO QUESTIONS 9-10: 1
AUDIT-C TOTAL SCORE: 0
HOW MANY STANDARD DRINKS CONTAINING ALCOHOL DO YOU HAVE ON A TYPICAL DAY: PATIENT DOES NOT DRINK

## 2024-04-15 ENCOUNTER — APPOINTMENT (OUTPATIENT)
Dept: MEDICAL GROUP | Facility: PHYSICIAN GROUP | Age: 64
End: 2024-04-15
Payer: COMMERCIAL

## 2024-04-15 VITALS
HEART RATE: 76 BPM | OXYGEN SATURATION: 98 % | TEMPERATURE: 98.6 F | RESPIRATION RATE: 16 BRPM | SYSTOLIC BLOOD PRESSURE: 122 MMHG | HEIGHT: 66 IN | BODY MASS INDEX: 24.22 KG/M2 | WEIGHT: 150.7 LBS | DIASTOLIC BLOOD PRESSURE: 66 MMHG

## 2024-04-15 DIAGNOSIS — G25.81 RESTLESS LEGS: ICD-10-CM

## 2024-04-15 DIAGNOSIS — K22.70 BARRETT'S ESOPHAGUS WITHOUT DYSPLASIA: ICD-10-CM

## 2024-04-15 DIAGNOSIS — D49.89 THYMOMA: ICD-10-CM

## 2024-04-15 DIAGNOSIS — G47.30 SLEEP APNEA, UNSPECIFIED TYPE: ICD-10-CM

## 2024-04-15 DIAGNOSIS — F51.01 PRIMARY INSOMNIA: ICD-10-CM

## 2024-04-15 DIAGNOSIS — D18.00 CAVERNOMA: ICD-10-CM

## 2024-04-15 DIAGNOSIS — Z80.3 FAMILY HISTORY OF MALIGNANT NEOPLASM OF BREAST: ICD-10-CM

## 2024-04-15 DIAGNOSIS — F41.1 GAD (GENERALIZED ANXIETY DISORDER): ICD-10-CM

## 2024-04-15 DIAGNOSIS — Z79.890 HORMONE REPLACEMENT THERAPY (HRT): ICD-10-CM

## 2024-04-15 DIAGNOSIS — R19.7 DIARRHEA, UNSPECIFIED TYPE: ICD-10-CM

## 2024-04-15 DIAGNOSIS — Z82.49 FAMILY HISTORY OF ABDOMINAL AORTIC ANEURYSM: ICD-10-CM

## 2024-04-15 DIAGNOSIS — B00.9 RECURRENT HSV (HERPES SIMPLEX VIRUS): ICD-10-CM

## 2024-04-15 DIAGNOSIS — C34.91 ADENOCARCINOMA OF RIGHT LUNG (HCC): ICD-10-CM

## 2024-04-15 DIAGNOSIS — E03.9 ACQUIRED HYPOTHYROIDISM: ICD-10-CM

## 2024-04-15 DIAGNOSIS — K64.0 FIRST DEGREE HEMORRHOIDS: ICD-10-CM

## 2024-04-15 DIAGNOSIS — N95.1 MENOPAUSAL SYNDROME (HOT FLASHES): ICD-10-CM

## 2024-04-15 DIAGNOSIS — R93.89 THICKENED ENDOMETRIUM: ICD-10-CM

## 2024-04-15 DIAGNOSIS — G57.62 MORTON'S NEUROMA OF LEFT FOOT: ICD-10-CM

## 2024-04-15 PROBLEM — M79.674 PAIN OF TOE OF RIGHT FOOT: Status: RESOLVED | Noted: 2023-02-17 | Resolved: 2024-04-15

## 2024-04-15 PROBLEM — M19.90 ARTHRITIS: Status: RESOLVED | Noted: 2021-01-12 | Resolved: 2024-04-15

## 2024-04-15 PROBLEM — K52.9 CHRONIC DIARRHEA: Status: RESOLVED | Noted: 2020-11-18 | Resolved: 2024-04-15

## 2024-04-15 PROBLEM — Z86.19 HISTORY OF COLD SORES: Status: ACTIVE | Noted: 2024-04-15

## 2024-04-15 PROBLEM — R63.4 UNINTENTIONAL WEIGHT LOSS: Status: RESOLVED | Noted: 2024-01-08 | Resolved: 2024-04-15

## 2024-04-15 PROBLEM — A04.9 BACTERIAL INTESTINAL INFECTION, UNSPECIFIED: Status: RESOLVED | Noted: 2023-09-30 | Resolved: 2024-04-15

## 2024-04-15 PROBLEM — E66.3 OVERWEIGHT WITH BODY MASS INDEX (BMI) 25.0-29.9: Status: RESOLVED | Noted: 2022-06-19 | Resolved: 2024-04-15

## 2024-04-15 PROBLEM — Z86.19 HISTORY OF COLD SORES: Status: RESOLVED | Noted: 2024-04-15 | Resolved: 2024-04-15

## 2024-04-15 PROBLEM — G47.39 OTHER SLEEP APNEA: Status: RESOLVED | Noted: 2018-11-01 | Resolved: 2024-04-15

## 2024-04-15 PROBLEM — B00.1 HERPES LABIALIS: Status: RESOLVED | Noted: 2019-02-08 | Resolved: 2024-04-15

## 2024-04-15 PROBLEM — J98.59 MEDIASTINAL MASS: Status: RESOLVED | Noted: 2024-01-26 | Resolved: 2024-04-15

## 2024-04-15 PROCEDURE — 3078F DIAST BP <80 MM HG: CPT | Performed by: PHYSICIAN ASSISTANT

## 2024-04-15 PROCEDURE — 3074F SYST BP LT 130 MM HG: CPT | Performed by: PHYSICIAN ASSISTANT

## 2024-04-15 PROCEDURE — 99215 OFFICE O/P EST HI 40 MIN: CPT | Performed by: PHYSICIAN ASSISTANT

## 2024-04-15 RX ORDER — VENLAFAXINE HYDROCHLORIDE 75 MG/1
2250 CAPSULE, EXTENDED RELEASE ORAL
COMMUNITY
End: 2024-04-15

## 2024-04-15 RX ORDER — VALACYCLOVIR HYDROCHLORIDE 1 G/1
TABLET, FILM COATED ORAL
Qty: 100 TABLET | Refills: 3 | Status: SHIPPED | OUTPATIENT
Start: 2024-04-15

## 2024-04-15 RX ORDER — VENLAFAXINE HYDROCHLORIDE 150 MG/1
1 CAPSULE, EXTENDED RELEASE ORAL
COMMUNITY
End: 2024-04-15

## 2024-04-15 RX ORDER — TRAZODONE HYDROCHLORIDE 50 MG/1
1 TABLET ORAL EVERY EVENING
COMMUNITY
End: 2024-04-15

## 2024-04-15 RX ORDER — VALACYCLOVIR HYDROCHLORIDE 1 G/1
TABLET, FILM COATED ORAL
Qty: 90 TABLET | Refills: 3 | OUTPATIENT
Start: 2024-04-15

## 2024-04-15 RX ORDER — CELECOXIB 200 MG/1
1 CAPSULE ORAL 2 TIMES DAILY
COMMUNITY
End: 2024-04-15

## 2024-04-15 RX ORDER — MONTELUKAST SODIUM 4 MG/1
180 TABLET, CHEWABLE ORAL
COMMUNITY
End: 2024-04-15

## 2024-04-15 ASSESSMENT — ENCOUNTER SYMPTOMS
CHILLS: 0
FEVER: 0
SHORTNESS OF BREATH: 0

## 2024-04-15 ASSESSMENT — FIBROSIS 4 INDEX: FIB4 SCORE: 1.39

## 2024-04-15 NOTE — ASSESSMENT & PLAN NOTE
Chronic, uncontrolled.  Patient tried trazodone 150 mg daily but it did not help.  Currently tolerating CBD, magnesium, L-tryptophan, and melatonin.  Seems to be helping.

## 2024-04-15 NOTE — ASSESSMENT & PLAN NOTE
Chronic, controlled.  US 4/2024:  1.  Thickened endometrium measuring about 1.1 cm nonspecific.  2.  Tiny uterine fibroid.    Follow up with Dr. Frias, GYN.

## 2024-04-15 NOTE — PROGRESS NOTES
SUBJECTIVE:     CC: establish care; prior Dr. Talavera     HPI:   Lizy presents today with the following:    ASSESSMENT & PLAN by Problem:       Problem List Items Addressed This Visit       Acquired hypothyroidism    Adenocarcinoma, lung (HCC)     Chronic, stable.  Status post surgical removal with clear margins.  Follows up with oncology in about 6 months.         Barnard's esophagus without dysplasia    Diarrhea    Family history of abdominal aortic aneurysm    Family history of malignant neoplasm of breast    First degree hemorrhoids    DERREK (generalized anxiety disorder)    Hormone replacement therapy (HRT)     Chronic, stable.  Tolerating/continue HRT through her functional medicine provider.         Menopausal syndrome (hot flashes)     Chronic, stable.  Tolerating/continue HRT through her functional medicine provider.         Evans's neuroma of left foot    Primary insomnia     Chronic, uncontrolled.  Patient tried trazodone 150 mg daily but it did not help.  Currently tolerating CBD, magnesium, L-tryptophan, and melatonin.  Seems to be helping.         Recurrent HSV (herpes simplex virus)     Chronic, controlled.  Tolerating/continue 1g daily.  Doubles up for 7 days for outbreaks.         Relevant Medications    valacyclovir (VALTREX) 1 GM Tab    Restless legs     Chronic, intermittent.  Seems to be controlled with magnesium and Hylands Restless Leg.         Sleep apnea, unspecified     Chronic, uncontrolled.  Diagnosed years ago.  Has been using a dental appliance for years.  In the process of getting a new dental appliance.         Thickened endometrium     Chronic, controlled.  US 4/2024:  1.  Thickened endometrium measuring about 1.1 cm nonspecific.  2.  Tiny uterine fibroid.    Follow up with Dr. Frias, GYN.         Thymoma     Chronic, ongoing.  Managed by pulmonology and oncology.            All other chronic issues are per HPI.  Follow-up 1 year, sooner as needed.  Patient is currently being  followed by other specialties who are monitoring her blood work chronic issues.    Return in about 1 year (around 4/15/2025), or if symptoms worsen or fail to improve.           HPI:     Problem   Barnard's Esophagus Without Dysplasia    Chronic, stable.  Tolerating/continue omeprazole 20 mg daily.  Followed by GI.  Repeat EGD in a year or 2.     Sleep Apnea, Unspecified    Chronic, uncontrolled.  Diagnosed years ago.  Has been using a dental appliance for years.  In the process of getting a new dental appliance.     Adenocarcinoma, Lung (Hcc)   Thymoma    Chronic, ongoing.    Pulmonology evaluation 4/10/2024:  Plan:  Pathology c/w adenoCa. S/p resection with Clear surgical margins. Plans for surveillance with oncology in 6 mos.  S/p resection. Surveillance imaging as per above.  She has mild decrease in BS LLL. No sxs. Continue to monitor and update PFT in 6 mos.   Return in about 6 months (around 10/10/2024) for PFT at time of f/u.     Primary Insomnia    Chronic, uncontrolled.  Patient tried trazodone 150 mg daily but it did not help.  Currently tolerating CBD, magnesium, L-tryptophan, and melatonin.  Seems to be helping.     Diarrhea    Chronic, intermittent.  Patient had a several month history of loose stool causing weight loss.  She is followed GI.  That resolved temporarily but has started again.  She has follow-up with GI in a couple weeks.     Thickened Endometrium    Chronic, controlled.  US 4/2024:  1.  Thickened endometrium measuring about 1.1 cm nonspecific.  2.  Tiny uterine fibroid.    Follow up with Dr. Frias, GYN.     Recurrent Hsv (Herpes Simplex Virus)    Chronic, controlled.  Tolerating/continue 1g daily.  Doubles up for 7 days for outbreaks.     Hormone Replacement Therapy (Hrt)    Chronic, stable.  Tolerating/continue HRT through her functional medicine provider.     First Degree Hemorrhoids    Chronic, stable.  Manageable without prescription/intervention.     Family History of Abdominal  Aortic Aneurysm    Patient's father and paternal grandmother both had AAA's.  Patient had a negative ultrasound about 2019.     Nathan's Neuroma of Left Foot    Status post 2 injections, no longer having pain.     Patel (Generalized Anxiety Disorder)    Chronic, stable.  Patient was on venlafaxine but feels stable without it.  She does feel little more emotional but is also going through some medical issues herself and recently lost her .     Acquired Hypothyroidism    Chronic, stable.  Patient is currently being managed by functional medicine.  Tolerating Hubbard 60 mg and levothyroxine 75 mcg daily, 1/2 tablet.     Menopausal Syndrome (Hot Flashes)    Chronic, stable.  Tolerating/continue HRT through her functional medicine provider.     Restless Legs    Chronic, intermittent.  Seems to be controlled with magnesium and Hylands Restless Leg.     Family History of Malignant Neoplasm of Breast    Paternal cousin and paternal aunt diagnosed with breast cancer.  Patient has an ultrasound pending for 5/8/2024.  Managed by GYN.     History of Cold Sores (Resolved)   Mediastinal Mass (Resolved)    Chronic, control unknown.  Biopys 1/24/2024.  Managed by pulmonology, [facility].    PATHOLOGY 1/26/2024:  Core biopsy consistent with a thymoma, favoring AB type.      Abnormal Findings On Diagnostic Imaging of Other Specified Body Structures (Resolved)   Unintentional Weight Loss (Resolved)   Bacterial Intestinal Infection, Unspecified (Resolved)   Pain of Toe of Right Foot (Resolved)   Overweight With Body Mass Index (Bmi) 25.0-29.9 (Resolved)   Arthritis (Resolved)   Chronic Diarrhea (Resolved)   Herpes Labialis (Resolved)   Other Sleep Apnea (Resolved)              ROS:  Review of Systems   Constitutional:  Negative for chills and fever.   Respiratory:  Negative for shortness of breath.    Cardiovascular:  Negative for chest pain.       OBJECTIVE:     Exam:  /66 (BP Location: Left arm, Patient Position:  "Sitting, BP Cuff Size: Adult)   Pulse 76   Temp 37 °C (98.6 °F) (Temporal)   Resp 16   Ht 1.676 m (5' 6\")   Wt 68.4 kg (150 lb 11.2 oz)   SpO2 98%   BMI 24.32 kg/m²  Body mass index is 24.32 kg/m².    Physical Exam  Vitals reviewed.   Constitutional:       General: She is not in acute distress.     Appearance: Normal appearance.   Pulmonary:      Effort: Pulmonary effort is normal.   Neurological:      General: No focal deficit present.      Mental Status: She is alert.   Psychiatric:         Mood and Affect: Mood normal.         Behavior: Behavior normal.         Judgment: Judgment normal.           CHART REVIEW:          Jennifer Duff, functional medicine:                   I spent a total of 52 minutes with record review, exam, communication with the patient, communication with other providers, and documentation of this encounter.      Please note that this dictation was created using voice recognition software. I have made every reasonable attempt to correct obvious errors, but I expect that there are errors of grammar and possibly content that I did not discover before finalizing the note.    "

## 2024-04-15 NOTE — ASSESSMENT & PLAN NOTE
Chronic, uncontrolled.  Diagnosed years ago.  Has been using a dental appliance for years.  In the process of getting a new dental appliance.

## 2024-04-15 NOTE — ASSESSMENT & PLAN NOTE
Chronic, stable.  Status post surgical removal with clear margins.  Follows up with oncology in about 6 months.

## 2024-05-07 NOTE — TELEPHONE ENCOUNTER
Received request via: Pharmacy    Was the patient seen in the last year in this department? Yes    Does the patient have an active prescription (recently filled or refills available) for medication(s) requested? No        Does the patient have residential Plus and need 100 day supply (blood pressure, diabetes and cholesterol meds only)? Patient does not have SCP

## 2024-05-08 ENCOUNTER — HOSPITAL ENCOUNTER (OUTPATIENT)
Dept: RADIOLOGY | Facility: MEDICAL CENTER | Age: 64
End: 2024-03-29
Attending: PHYSICIAN ASSISTANT
Payer: COMMERCIAL

## 2024-05-08 DIAGNOSIS — R92.30 DENSE BREAST: ICD-10-CM

## 2024-05-08 DIAGNOSIS — R92.2 DENSE BREAST: ICD-10-CM

## 2024-05-10 RX ORDER — LEVOTHYROXINE SODIUM 0.07 MG/1
75 TABLET ORAL
Qty: 90 TABLET | Refills: 3 | Status: SHIPPED | OUTPATIENT
Start: 2024-05-10

## 2024-06-05 ENCOUNTER — NON-PROVIDER VISIT (OUTPATIENT)
Dept: SLEEP MEDICINE | Facility: MEDICAL CENTER | Age: 64
End: 2024-06-05
Attending: INTERNAL MEDICINE
Payer: COMMERCIAL

## 2024-06-05 VITALS — BODY MASS INDEX: 25.5 KG/M2 | WEIGHT: 158 LBS

## 2024-06-05 DIAGNOSIS — R91.1 LUNG NODULE: ICD-10-CM

## 2024-06-05 DIAGNOSIS — D49.89 THYMOMA: ICD-10-CM

## 2024-06-05 DIAGNOSIS — Z98.890 POST-OPERATIVE STATE: ICD-10-CM

## 2024-06-05 PROCEDURE — 94060 EVALUATION OF WHEEZING: CPT | Performed by: INTERNAL MEDICINE

## 2024-06-05 PROCEDURE — 94726 PLETHYSMOGRAPHY LUNG VOLUMES: CPT | Performed by: INTERNAL MEDICINE

## 2024-06-05 PROCEDURE — 94060 EVALUATION OF WHEEZING: CPT | Mod: 26 | Performed by: INTERNAL MEDICINE

## 2024-06-05 PROCEDURE — 94726 PLETHYSMOGRAPHY LUNG VOLUMES: CPT | Mod: 26 | Performed by: INTERNAL MEDICINE

## 2024-06-05 PROCEDURE — 94729 DIFFUSING CAPACITY: CPT | Performed by: INTERNAL MEDICINE

## 2024-06-05 PROCEDURE — 94729 DIFFUSING CAPACITY: CPT | Mod: 26 | Performed by: INTERNAL MEDICINE

## 2024-06-05 ASSESSMENT — PULMONARY FUNCTION TESTS
FEV1: 2.17
FEV1/FVC: 83.46
FEV1/FVC_PERCENT_CHANGE: 5
FEV1_PERCENT_CHANGE: -2
FEV1/FVC_PERCENT_CHANGE: -50
FEV1_PREDICTED: 2.58
FEV1/FVC_PERCENT_PREDICTED: 106
FEV1: 2.13
FVC_PERCENT_PREDICTED: 81
FEV1/FVC: 79
FVC: 2.6
FVC_PREDICTED: 3.3
FEV1_PERCENT_PREDICTED: 82
FVC: 2.68
FEV1_PERCENT_PREDICTED: 84
FEV1/FVC_PERCENT_PREDICTED: 108
FEV1/FVC_PERCENT_PREDICTED: 101
FEV1/FVC_PREDICTED: 79
FVC_PERCENT_PREDICTED: 78
FEV1/FVC_PERCENT_LLN: 66
FEV1/FVC: 84
FVC_LLN: 2.76
FEV1/FVC: 79
FEV1_PERCENT_CHANGE: 1
FEV1/FVC_PERCENT_PREDICTED: 78
FEV1/FVC_PERCENT_PREDICTED: 100
FEV1_LLN: 2.15

## 2024-06-05 ASSESSMENT — FIBROSIS 4 INDEX: FIB4 SCORE: 1.39

## 2024-06-05 NOTE — PROCEDURES
Technician: LYNNETTE Richards    Technician Comment:  Good patient effort & cooperation.  The results of this test meet the ATS/ERS standards for acceptability & reproducibility.  Test was performed on the YODIL Body Plethysmograph-Elite DX system.  Predicted values were GLI-2012 for spirometry, GLI-2020 for DLCO, ITS for Lung Volumes.  The DLCO was uncorrected for Hgb.  A bronchodilator of Albuterol HFA -2puffs via spacer administered.  DLCO performed during dilation period.    Interpretation:  Baseline spirometry shows low normal FVC at 2.68 L or 81% predicted and low normal FEV1 at 2.13 L or 82% predicted with a normal FEV1/FVC ratio 79.  No significant bronchodilator response.  Lung volumes are normal.  Diffusion capacity is normal.  Overall normal pulmonary function testing with normal flow volume loop.

## 2024-06-18 ENCOUNTER — HOSPITAL ENCOUNTER (OUTPATIENT)
Dept: LAB | Facility: MEDICAL CENTER | Age: 64
End: 2024-06-18
Attending: OBSTETRICS & GYNECOLOGY
Payer: COMMERCIAL

## 2024-06-18 LAB
CORTIS SERPL-MCNC: 19.4 UG/DL (ref 0–23)
DHEA-S SERPL-MCNC: 38.4 UG/DL (ref 18.9–205)
EST. AVERAGE GLUCOSE BLD GHB EST-MCNC: 120 MG/DL
ESTRADIOL SERPL-MCNC: 39.3 PG/ML
FOLATE SERPL-MCNC: 33.7 NG/ML
HBA1C MFR BLD: 5.8 % (ref 4–5.6)
PROGEST SERPL-MCNC: 6.6 NG/ML
T3 SERPL-MCNC: 131 NG/DL (ref 60–181)
T3FREE SERPL-MCNC: 3.6 PG/ML (ref 2–4.4)
T4 FREE SERPL-MCNC: 1.22 NG/DL (ref 0.93–1.7)
T4 SERPL-MCNC: 7.2 UG/DL (ref 4–12)
TSH SERPL DL<=0.005 MIU/L-ACNC: 1.46 UIU/ML (ref 0.38–5.33)
VIT B12 SERPL-MCNC: 1835 PG/ML (ref 211–911)

## 2024-06-18 PROCEDURE — 84144 ASSAY OF PROGESTERONE: CPT

## 2024-06-18 PROCEDURE — 36415 COLL VENOUS BLD VENIPUNCTURE: CPT

## 2024-06-18 PROCEDURE — 82607 VITAMIN B-12: CPT

## 2024-06-18 PROCEDURE — 84443 ASSAY THYROID STIM HORMONE: CPT

## 2024-06-18 PROCEDURE — 83036 HEMOGLOBIN GLYCOSYLATED A1C: CPT

## 2024-06-18 PROCEDURE — 84207 ASSAY OF VITAMIN B-6: CPT

## 2024-06-18 PROCEDURE — 82533 TOTAL CORTISOL: CPT

## 2024-06-18 PROCEDURE — 84270 ASSAY OF SEX HORMONE GLOBUL: CPT

## 2024-06-18 PROCEDURE — 82627 DEHYDROEPIANDROSTERONE: CPT

## 2024-06-18 PROCEDURE — 84402 ASSAY OF FREE TESTOSTERONE: CPT

## 2024-06-18 PROCEDURE — 84403 ASSAY OF TOTAL TESTOSTERONE: CPT

## 2024-06-18 PROCEDURE — 82670 ASSAY OF TOTAL ESTRADIOL: CPT

## 2024-06-18 PROCEDURE — 84439 ASSAY OF FREE THYROXINE: CPT

## 2024-06-18 PROCEDURE — 84481 FREE ASSAY (FT-3): CPT

## 2024-06-18 PROCEDURE — 84480 ASSAY TRIIODOTHYRONINE (T3): CPT

## 2024-06-18 PROCEDURE — 82746 ASSAY OF FOLIC ACID SERUM: CPT

## 2024-06-21 LAB — VIT B6 SERPL-MCNC: 141.9 NMOL/L (ref 20–125)

## 2024-06-23 LAB
SHBG SERPL-SCNC: 99 NMOL/L (ref 17–125)
TESTOST FREE SERPL-MCNC: 4.3 PG/ML (ref 0.6–3.8)
TESTOST SERPL-MCNC: 54 NG/DL (ref 5–32)

## 2024-08-21 ENCOUNTER — APPOINTMENT (OUTPATIENT)
Dept: RADIOLOGY | Facility: MEDICAL CENTER | Age: 64
End: 2024-08-21
Attending: PHYSICIAN ASSISTANT
Payer: COMMERCIAL

## 2024-08-21 PROCEDURE — 76641 ULTRASOUND BREAST COMPLETE: CPT

## 2024-08-28 ENCOUNTER — APPOINTMENT (RX ONLY)
Dept: URBAN - METROPOLITAN AREA CLINIC 4 | Facility: CLINIC | Age: 64
Setting detail: DERMATOLOGY
End: 2024-08-28

## 2024-08-28 DIAGNOSIS — Z71.89 OTHER SPECIFIED COUNSELING: ICD-10-CM

## 2024-08-28 DIAGNOSIS — L57.0 ACTINIC KERATOSIS: ICD-10-CM

## 2024-08-28 DIAGNOSIS — L81.4 OTHER MELANIN HYPERPIGMENTATION: ICD-10-CM

## 2024-08-28 DIAGNOSIS — D22 MELANOCYTIC NEVI: ICD-10-CM

## 2024-08-28 DIAGNOSIS — L82.1 OTHER SEBORRHEIC KERATOSIS: ICD-10-CM

## 2024-08-28 DIAGNOSIS — D18.0 HEMANGIOMA: ICD-10-CM

## 2024-08-28 PROBLEM — D22.5 MELANOCYTIC NEVI OF TRUNK: Status: ACTIVE | Noted: 2024-08-28

## 2024-08-28 PROBLEM — D18.01 HEMANGIOMA OF SKIN AND SUBCUTANEOUS TISSUE: Status: ACTIVE | Noted: 2024-08-28

## 2024-08-28 PROCEDURE — ? SUNSCREEN RECOMMENDATIONS

## 2024-08-28 PROCEDURE — 99213 OFFICE O/P EST LOW 20 MIN: CPT | Mod: 25

## 2024-08-28 PROCEDURE — ? LIQUID NITROGEN

## 2024-08-28 PROCEDURE — 17003 DESTRUCT PREMALG LES 2-14: CPT

## 2024-08-28 PROCEDURE — ? ADDITIONAL NOTES

## 2024-08-28 PROCEDURE — 17000 DESTRUCT PREMALG LESION: CPT

## 2024-08-28 PROCEDURE — ? COUNSELING

## 2024-08-28 ASSESSMENT — LOCATION DETAILED DESCRIPTION DERM
LOCATION DETAILED: RIGHT RADIAL DORSAL HAND
LOCATION DETAILED: LEFT RADIAL DORSAL HAND
LOCATION DETAILED: LEFT POSTERIOR SHOULDER
LOCATION DETAILED: INFERIOR THORACIC SPINE
LOCATION DETAILED: EPIGASTRIC SKIN
LOCATION DETAILED: RIGHT CENTRAL TEMPLE
LOCATION DETAILED: SUPERIOR LUMBAR SPINE
LOCATION DETAILED: RIGHT INFERIOR MEDIAL FOREHEAD
LOCATION DETAILED: LEFT DORSAL MIDDLE METACARPOPHALANGEAL JOINT
LOCATION DETAILED: LEFT MEDIAL FOREHEAD
LOCATION DETAILED: RIGHT ULNAR DORSAL HAND
LOCATION DETAILED: LEFT ULNAR DORSAL HAND
LOCATION DETAILED: RIGHT DORSAL INDEX METACARPOPHALANGEAL JOINT
LOCATION DETAILED: RIGHT POSTERIOR SHOULDER

## 2024-08-28 ASSESSMENT — LOCATION SIMPLE DESCRIPTION DERM
LOCATION SIMPLE: LEFT HAND
LOCATION SIMPLE: RIGHT FOREHEAD
LOCATION SIMPLE: RIGHT SHOULDER
LOCATION SIMPLE: UPPER BACK
LOCATION SIMPLE: LOWER BACK
LOCATION SIMPLE: RIGHT HAND
LOCATION SIMPLE: RIGHT TEMPLE
LOCATION SIMPLE: ABDOMEN
LOCATION SIMPLE: LEFT FOREHEAD
LOCATION SIMPLE: LEFT SHOULDER

## 2024-08-28 ASSESSMENT — LOCATION ZONE DERM
LOCATION ZONE: TRUNK
LOCATION ZONE: HAND
LOCATION ZONE: FACE
LOCATION ZONE: ARM

## 2024-09-12 ENCOUNTER — APPOINTMENT (RX ONLY)
Dept: URBAN - METROPOLITAN AREA CLINIC 20 | Facility: CLINIC | Age: 64
Setting detail: DERMATOLOGY
End: 2024-09-12

## 2024-09-12 ENCOUNTER — HOSPITAL ENCOUNTER (OUTPATIENT)
Dept: LAB | Facility: MEDICAL CENTER | Age: 64
End: 2024-09-12
Attending: STUDENT IN AN ORGANIZED HEALTH CARE EDUCATION/TRAINING PROGRAM
Payer: COMMERCIAL

## 2024-09-12 DIAGNOSIS — Z41.9 ENCOUNTER FOR PROCEDURE FOR PURPOSES OTHER THAN REMEDYING HEALTH STATE, UNSPECIFIED: ICD-10-CM

## 2024-09-12 DIAGNOSIS — D49.89 THYMOMA: ICD-10-CM

## 2024-09-12 DIAGNOSIS — C34.91 ADENOCARCINOMA OF RIGHT LUNG (HCC): ICD-10-CM

## 2024-09-12 LAB
ALBUMIN SERPL BCP-MCNC: 4.2 G/DL (ref 3.2–4.9)
ALBUMIN/GLOB SERPL: 1.6 G/DL
ALP SERPL-CCNC: 88 U/L (ref 30–99)
ALT SERPL-CCNC: 19 U/L (ref 2–50)
ANION GAP SERPL CALC-SCNC: 14 MMOL/L (ref 7–16)
AST SERPL-CCNC: 17 U/L (ref 12–45)
BILIRUB SERPL-MCNC: 0.3 MG/DL (ref 0.1–1.5)
BUN SERPL-MCNC: 14 MG/DL (ref 8–22)
CALCIUM ALBUM COR SERPL-MCNC: 9.2 MG/DL (ref 8.5–10.5)
CALCIUM SERPL-MCNC: 9.4 MG/DL (ref 8.5–10.5)
CHLORIDE SERPL-SCNC: 101 MMOL/L (ref 96–112)
CO2 SERPL-SCNC: 24 MMOL/L (ref 20–33)
CREAT SERPL-MCNC: 0.79 MG/DL (ref 0.5–1.4)
GFR SERPLBLD CREATININE-BSD FMLA CKD-EPI: 84 ML/MIN/1.73 M 2
GLOBULIN SER CALC-MCNC: 2.7 G/DL (ref 1.9–3.5)
GLUCOSE SERPL-MCNC: 96 MG/DL (ref 65–99)
POTASSIUM SERPL-SCNC: 4.3 MMOL/L (ref 3.6–5.5)
PROT SERPL-MCNC: 6.9 G/DL (ref 6–8.2)
SODIUM SERPL-SCNC: 139 MMOL/L (ref 135–145)

## 2024-09-12 PROCEDURE — ? COSMETIC CONSULTATION: LASER RESURFACING

## 2024-09-12 PROCEDURE — 36415 COLL VENOUS BLD VENIPUNCTURE: CPT

## 2024-09-12 PROCEDURE — 80053 COMPREHEN METABOLIC PANEL: CPT

## 2024-09-18 ENCOUNTER — HOSPITAL ENCOUNTER (OUTPATIENT)
Dept: RADIOLOGY | Facility: MEDICAL CENTER | Age: 64
End: 2024-09-18
Attending: STUDENT IN AN ORGANIZED HEALTH CARE EDUCATION/TRAINING PROGRAM
Payer: COMMERCIAL

## 2024-09-18 ENCOUNTER — PATIENT MESSAGE (OUTPATIENT)
Dept: HEMATOLOGY ONCOLOGY | Facility: MEDICAL CENTER | Age: 64
End: 2024-09-18

## 2024-09-18 DIAGNOSIS — C34.91 ADENOCARCINOMA OF RIGHT LUNG (HCC): ICD-10-CM

## 2024-09-18 DIAGNOSIS — D49.89 THYMOMA: ICD-10-CM

## 2024-09-18 PROCEDURE — 700117 HCHG RX CONTRAST REV CODE 255: Performed by: STUDENT IN AN ORGANIZED HEALTH CARE EDUCATION/TRAINING PROGRAM

## 2024-09-18 PROCEDURE — 71260 CT THORAX DX C+: CPT

## 2024-09-18 RX ADMIN — IOHEXOL 75 ML: 350 INJECTION, SOLUTION INTRAVENOUS at 11:00

## 2024-09-20 ENCOUNTER — HOSPITAL ENCOUNTER (OUTPATIENT)
Dept: HEMATOLOGY ONCOLOGY | Facility: MEDICAL CENTER | Age: 64
End: 2024-09-20
Payer: COMMERCIAL

## 2024-09-20 ENCOUNTER — APPOINTMENT (OUTPATIENT)
Dept: HEMATOLOGY ONCOLOGY | Facility: MEDICAL CENTER | Age: 64
End: 2024-09-20
Payer: COMMERCIAL

## 2024-09-20 VITALS
TEMPERATURE: 97.5 F | DIASTOLIC BLOOD PRESSURE: 70 MMHG | HEIGHT: 66 IN | BODY MASS INDEX: 26.4 KG/M2 | HEART RATE: 77 BPM | WEIGHT: 164.24 LBS | OXYGEN SATURATION: 96 % | SYSTOLIC BLOOD PRESSURE: 124 MMHG

## 2024-09-20 DIAGNOSIS — D49.89 THYMOMA: ICD-10-CM

## 2024-09-20 DIAGNOSIS — R06.02 SOB (SHORTNESS OF BREATH) ON EXERTION: ICD-10-CM

## 2024-09-20 DIAGNOSIS — C34.91 ADENOCARCINOMA OF RIGHT LUNG (HCC): ICD-10-CM

## 2024-09-20 PROCEDURE — 99212 OFFICE O/P EST SF 10 MIN: CPT

## 2024-09-20 PROCEDURE — 99214 OFFICE O/P EST MOD 30 MIN: CPT

## 2024-09-20 ASSESSMENT — ENCOUNTER SYMPTOMS
PSYCHIATRIC NEGATIVE: 1
CHILLS: 0
MUSCULOSKELETAL NEGATIVE: 1
EYES NEGATIVE: 1
DIAPHORESIS: 0
WEIGHT LOSS: 0
CARDIOVASCULAR NEGATIVE: 1
GASTROINTESTINAL NEGATIVE: 1
SHORTNESS OF BREATH: 1
FEVER: 0
NEUROLOGICAL NEGATIVE: 1

## 2024-09-20 ASSESSMENT — FIBROSIS 4 INDEX: FIB4 SCORE: 1.14

## 2024-09-20 ASSESSMENT — PAIN SCALES - GENERAL: PAINLEVEL: NO PAIN

## 2024-09-20 NOTE — PROGRESS NOTES
Subjective     Lizy Saleem is a 64 y.o. female who presents for continued management of stage I thymoma as well as adenocarcinoma lung, status postsurgery.      Cancer  Pertinent negatives include no chills, diaphoresis, fever or rash.   History of Presenting Illness:  Lizy Saleem is a 63 y.o. female who presents today with a diagnosis of thymoma     Patient reports that she has had a rough year.  She lost her mother to lung cancer  in May 2023.       She and her  got COVID19 2023.  At that time she noted that she lost her appetite.  She subsequently lost her  to COVID19 PNA in 11/2023.  She developed chronic diarrhea and loss of appetite around that time.  She has been losing weight.  This prompted her to see Dr. Lewis (GI) on December 28, 2023 for her diarrhea and loss of appetite who ordered a CT AP.  A mass in her mediastinum was seen by the radiologist.  There is no concern for malignancy in her abdomen and pelvis.  As such a CT Chest was subsequently ordered on December 29 which shows a 6.8 x 9.9 x 13 cm lobulated mass within the left anterior mediastinum.  Additionally there was a 1.2 cm ill-defined opacity in the right upper lobe.     She had a colonoscopy and EGD on 1/11/24.  Only findings was Barretts esophagitis, and she was placed on omeprazole.      She subsequently had a biopsy of the mediastinum on January 24 which confirms thymoma.     Patient's case was presented in tumor board this morning.  It was decided that she would benefit from resection of the thymoma as well as a wedge resection of her right upper lobe lung mass.     Patient underwent thymectomy as well as a right upper lobe lung mass resection.     She reports that she did very well after the surgery and is surprised how quickly she has recovered.     Interval History:  Patient returns to clinic today for follow-up appointment and to review her recent CT scan.  She is accompanied today by her friend  Melanie.  Patient reports feeling well in general, with good appetite and energy levels.  She reports she is recovering well from her surgery, however she does continue to get slightly out of breath with activity.  She reports she did have another episode of COVID in early September, which has not resolved.  She denies any fevers, chills, cough, enlarged lymph nodes, or hemoptysis.  She has seen her CT scan, and is pleased with her results.  She reports she is up-to-date with her other screenings, including her colonoscopy and mammogram.  No other complaints or concerns provided.    Review of Systems   Constitutional:  Negative for chills, diaphoresis, fever, malaise/fatigue and weight loss.   HENT: Negative.     Eyes: Negative.    Respiratory:  Positive for shortness of breath.         Reports ongoing mild of shortness of breath, present since surgery.   Cardiovascular: Negative.    Gastrointestinal: Negative.    Genitourinary: Negative.    Musculoskeletal: Negative.    Skin:  Negative for itching and rash.   Neurological: Negative.    Endo/Heme/Allergies: Negative.    Psychiatric/Behavioral: Negative.            Past Medical History:   Diagnosis Date    Acquired hypothyroidism 11/01/2018    Anesthesia       PONV    Anxiety reaction 11/01/2018    Barnard's esophagus      Bowel habit changes       diarrhea    Bronchitis 2004     Lasted a month    Cancer (HCC) 2024     thymoma    Chronic diarrhea 11/18/2020    Chronic midline low back pain without sciatica 11/18/2020    Family history of abdominal aortic aneurysm 02/08/2019    Herpes labialis 02/08/2019    Evans's neuroma of left foot 11/08/2018    PONV (postoperative nausea and vomiting) 2000 and 2003     For bunionectomies    Sleep apnea 2012     Did sleep study with dentist wear dental appliance    Snoring              Past Surgical History         Past Surgical History:   Procedure Laterality Date    STERNOTMY WITH BILATERAL WEDGE Right 3/11/2024     Procedure:  STERNOTOMY, RESECTION THYMOMA, WEDGE RESECTION RIGHT UPPER LOBE;  Surgeon: John H Ganser, M.D.;  Location: SURGERY [facility];  Service: General    HYSTEROSCOPY WITH VIDEO DIAGNOSTIC   10/23/2015     Procedure: HYSTEROSCOPY WITH VIDEO DIAGNOSTIC;  Surgeon: Lesli rFias M.D.;  Location: SURGERY [facility];  Service:     DILATION AND CURETTAGE   10/23/2015     Procedure: DILATION AND CURETTAGE;  Surgeon: Lesli Frias M.D.;  Location: SURGERY [facility];  Service:     BUNIONECTOMY Right 01/01/2003    BUNIONECTOMY Left 01/01/2000    ARTHROSCOPY, KNEE Left 01/01/1992    OTHER   1991     Left knee meniscus repair               Social History           Tobacco Use    Smoking status: Never    Smokeless tobacco: Never   Vaping Use    Vaping Use: Never used   Substance Use Topics    Alcohol use: Not Currently    Drug use: No            Family History         Family History   Problem Relation Age of Onset    Breast Cancer Paternal Aunt      Cancer Father           Prostate    Hyperlipidemia Father      Hypertension Father      Other Father           multiple aneurisms    Thyroid Mother      Cancer Mother           Melanoma left eye and on leg    Lung Disease Mother      Cancer Cousin      Cancer Maternal Uncle           Stomach cancer    Cancer Paternal Aunt           Masectomy    Cancer Sister           Thyroid cancer         Allergies   Allergen Reactions    Latex Rash     Rash with latex bandages     Current Outpatient Medications on File Prior to Encounter   Medication Sig Dispense Refill    levothyroxine (SYNTHROID) 75 MCG Tab TAKE 1 TABLET BY MOUTH EVERY DAY IN THE MORNING ON AN EMPTY STOMACH 90 Tablet 3    NON SPECIFIED 5 mg 2 times a day. C-Testosterone   Apply 2 clicks topically at night      PROGESTERONE PO Place 100 mg under the tongue every day. C-progesterone      valacyclovir (VALTREX) 1 GM Tab TAKE 1 TABLET BY MOUTH EVERY DAY; DOUBLE THE DOSE FOR 7 DAYS AS NEEDED FOR OUTBREAKS 100 Tablet  "3    ARMOUR THYROID 60 MG Tab TAKE 1 TABLET BY MOUTH EVERY DAY** NOT COVERED      omeprazole (PRILOSEC) 20 MG delayed-release capsule Take 20 mg by mouth every morning.      B Complex Vitamins (B COMPLEX PO) Take 1 Tablet by mouth every morning.      Ascorbic Acid (VITAMIN C) 1000 MG Tab Take 1,000 mg by mouth 2 times a day.      Omega-3 Fatty Acids (OMEGA 3 PO) Take 1 Tablet by mouth every morning.      Multiple Minerals-Vitamins (CALCIUM CITRATE PLUS/MAGNESIUM PO) Take 1 Tablet by mouth every morning.      Probiotic Product (PRO-BIOTIC BLEND PO) Take 1 Tablet by mouth every morning.      NON SPECIFIED C-E2/E3 25 mg/mlTake 0.1ml SL daily (Patient taking differently: C-E2/E3 25 mg/mlTake 0.1ml SL daily. Nightly except Sunday.) 5 Each 1    Ferrous Fumarate (IRON) 18 MG Tab CR Take 1 Tablet by mouth every morning.       No current facility-administered medications on file prior to encounter.          Objective     /70 (BP Location: Right arm, Patient Position: Sitting, BP Cuff Size: Adult)   Pulse 77   Temp 36.4 °C (97.5 °F) (Temporal)   Ht 1.676 m (5' 5.98\")   Wt 74.5 kg (164 lb 3.9 oz)   SpO2 96%   BMI 26.52 kg/m²      Physical Exam  Constitutional:       Appearance: Normal appearance. She is normal weight.   HENT:      Head: Normocephalic and atraumatic.      Nose: Nose normal.      Mouth/Throat:      Mouth: Mucous membranes are moist.      Pharynx: Oropharynx is clear.   Eyes:      Extraocular Movements: Extraocular movements intact.      Conjunctiva/sclera: Conjunctivae normal.   Cardiovascular:      Rate and Rhythm: Normal rate and regular rhythm.   Pulmonary:      Effort: Pulmonary effort is normal.      Breath sounds: Normal breath sounds.   Abdominal:      General: Abdomen is flat. Bowel sounds are normal.      Palpations: Abdomen is soft.   Musculoskeletal:         General: Normal range of motion.      Cervical back: Normal range of motion.   Skin:     General: Skin is warm and dry. "   Neurological:      General: No focal deficit present.      Mental Status: She is alert and oriented to person, place, and time.   Psychiatric:         Mood and Affect: Mood normal.         Behavior: Behavior normal.         Judgment: Judgment normal.                  Imagin2023 CT CAP   1.  6.8 x 9.9 x 13.0 cm lobulated mass within the left anterior mediastinum. Differential considerations include lymphoma, thymoma, teratoma.   2.  1.2 cm ill-defined opacity right upper lobe indeterminate.   3.  No pleural effusions.    2024 PET body imaging:   FINDINGS:  Head and neck: No abnormal focal FDG activity.   Chest: Heterogeneous increased activity within the large anterior mediastinal mass, max SUV 4.74.  Mild focal uptake in the periphery the RIGHT midlung, max SUV 1.59, corresponding to ill-defined nodule on CT.  Abdomen and pelvis: No abnormal focal FDG activity.  Musculoskeletal: No abnormal focal FDG activity.  Incidental findings on CT: Large anterior mediastinal mass again noted.  Ill-defined nodular density in the RIGHT upper lobe posteriorly measuring approximately 10 mm.   IMPRESSION:   1.  Heterogeneous increased metabolic activity within large anterior mediastinal mass consistent with known thymoma.  2.  Mild increased activity corresponding to ill-defined RIGHT upper lobe nodule favoring benign process however bronchoalveolar carcinoma can have variable uptake on PET imaging.  3.  No abnormal metabolic activity in the abdomen or pelvis.      2024 CT scan of the chest with contrast.  FINDINGS:  Lungs: Postsurgical changes in the RIGHT upper lobe. No nodule.  Mediastinum/Addis: Interval sternotomy. No evidence of residual anterior mediastinal mass or abnormally enlarged lymph nodes.  Pleura: No pleural effusion.  Cardiac: Heart normal in size without pericardial effusion.  Vascular: Unremarkable.  Soft tissues: Unremarkable.  Bones: No acute or destructive process.  7 x 15 mm segment 7  hepatic hypodense lesion abutting the intrahepatic IVC is unchanged.  IMPRESSION:  1.  No evidence of residual RIGHT upper lobe mass following wedge resection  2.  No evidence of residual anterior mediastinal mass following sternotomy  3.  Nonspecific hepatic hypodense lesion unchanged. This could be a cyst or hemangioma but it is incompletely characterized at this time.        Pathology:  1/30/2024  A. Lung/mediastinal biopsy:          Core biopsy consistent with a thymoma, favoring AB type.      3/11/24  A. Thymoma:          Thymoma, type AB.   B. Right upper lobe lung mass:          Lung adenocarcinoma with lepidic and acinar features.          A carcinoid tumorlet, 0.48 cm in greatest dimension with 1           mitoses per 2 mm2, no necrosis and Ki-67 of < 3%, is noted           within the lepidic area of the adenocarcinoma.            Assessment & Plan      Cancer Staging   Adenocarcinoma, lung (HCC)  Staging form: Lung, AJCC 8th Edition  - Clinical stage from 3/11/2024: Stage IA1 (cT1a, cN0, cM0) - Signed by Fabby Holm M.D. on 3/15/2024     Thymoma  Staging form: Thymus, AJCC 8th Edition  - Clinical stage from 1/30/2024: Stage I (cT1a, cN0, cM0) - Signed by Fabby Holm M.D. on 1/30/2024     Impression:  1) Stage I Thymoma s/p thymectomy and RUL adenocarcinoma s/p wedge resection stage 1A1.   2) CT chest with contrast 9/18/2024 JOSE     Per NCCN guidelines stage I thymoma should continue surveillance with chest CT with contrast every 6 to 12 months for 2 years, and then annually for a total of 10 years.   Surveillance for non-small cell lung cancer includes chest CT with or without contrast every 6 months for 2 to 3 years and then a low-dose noncontrast enhanced chest CT annually.    Plan   -CT chest with contrast in 6 months.  -Follow-up with me shortly after.  -Continue to follow with pulmonologist, and surgeon as scheduled.  Also follow-up with PCP for age-appropriate health maintenance and  screenings.    Reviewed with patient strategies to reduce risk of overall cancer incidence and recurrence, including regular physical activity, maintenance of healthy BMI, avoidance of use of tobacco products, and limiting alcoholic intake.    Plan of care reviewed with patient and all questions answered.  Patient verbalizes understanding, denies questions, and agrees with plan of care.  Patient instructed to call or message clinic with any questions or concerns, contact information provided.    Thank you for allowing me the privilege of caring for this patient.  If if you have any questions, please do not hesitate to reach out.  I may be contacted at .    Please note that this dictation was created using voice recognition software. I have made every reasonable attempt to correct obvious errors, but I expect that there are errors of grammar, and possibly content, that I did not discover before finalizing the note.

## 2024-10-10 DIAGNOSIS — D49.89 THYMOMA: ICD-10-CM

## 2024-10-10 DIAGNOSIS — C34.91 ADENOCARCINOMA OF RIGHT LUNG (HCC): ICD-10-CM

## 2024-10-10 DIAGNOSIS — R06.02 SOB (SHORTNESS OF BREATH) ON EXERTION: Primary | ICD-10-CM

## 2024-10-11 DIAGNOSIS — C34.91 ADENOCARCINOMA OF RIGHT LUNG (HCC): ICD-10-CM

## 2024-11-20 ENCOUNTER — HOSPITAL ENCOUNTER (OUTPATIENT)
Dept: LAB | Facility: MEDICAL CENTER | Age: 64
End: 2024-11-20
Attending: OBSTETRICS & GYNECOLOGY
Payer: COMMERCIAL

## 2024-11-20 LAB
25(OH)D3 SERPL-MCNC: 63 NG/ML (ref 30–100)
ALBUMIN SERPL BCP-MCNC: 4.2 G/DL (ref 3.2–4.9)
ALBUMIN/GLOB SERPL: 1.6 G/DL
ALP SERPL-CCNC: 60 U/L (ref 30–99)
ALT SERPL-CCNC: 10 U/L (ref 2–50)
ANION GAP SERPL CALC-SCNC: 10 MMOL/L (ref 7–16)
AST SERPL-CCNC: 16 U/L (ref 12–45)
BASOPHILS # BLD AUTO: 0.3 % (ref 0–1.8)
BASOPHILS # BLD: 0.02 K/UL (ref 0–0.12)
BILIRUB SERPL-MCNC: 0.4 MG/DL (ref 0.1–1.5)
BUN SERPL-MCNC: 26 MG/DL (ref 8–22)
CALCIUM ALBUM COR SERPL-MCNC: 8.7 MG/DL (ref 8.5–10.5)
CALCIUM SERPL-MCNC: 8.9 MG/DL (ref 8.5–10.5)
CHLORIDE SERPL-SCNC: 105 MMOL/L (ref 96–112)
CO2 SERPL-SCNC: 23 MMOL/L (ref 20–33)
CORTIS SERPL-MCNC: 12.4 UG/DL (ref 0–23)
CREAT SERPL-MCNC: 0.8 MG/DL (ref 0.5–1.4)
DHEA-S SERPL-MCNC: 52.2 UG/DL (ref 18.9–205)
EOSINOPHIL # BLD AUTO: 0.12 K/UL (ref 0–0.51)
EOSINOPHIL NFR BLD: 2.1 % (ref 0–6.9)
ERYTHROCYTE [DISTWIDTH] IN BLOOD BY AUTOMATED COUNT: 49.1 FL (ref 35.9–50)
EST. AVERAGE GLUCOSE BLD GHB EST-MCNC: 123 MG/DL
ESTRADIOL SERPL-MCNC: 28.3 PG/ML
FERRITIN SERPL-MCNC: 13.8 NG/ML (ref 10–291)
FOLATE SERPL-MCNC: 18.5 NG/ML
FSH SERPL-ACNC: 44 MIU/ML
GFR SERPLBLD CREATININE-BSD FMLA CKD-EPI: 83 ML/MIN/1.73 M 2
GLOBULIN SER CALC-MCNC: 2.6 G/DL (ref 1.9–3.5)
GLUCOSE SERPL-MCNC: 91 MG/DL (ref 65–99)
HBA1C MFR BLD: 5.9 % (ref 4–5.6)
HCT VFR BLD AUTO: 44 % (ref 37–47)
HCYS SERPL-SCNC: 7.2 UMOL/L
HGB BLD-MCNC: 14.6 G/DL (ref 12–16)
IMM GRANULOCYTES # BLD AUTO: 0.01 K/UL (ref 0–0.11)
IMM GRANULOCYTES NFR BLD AUTO: 0.2 % (ref 0–0.9)
LH SERPL-ACNC: 26.4 IU/L
LYMPHOCYTES # BLD AUTO: 1.6 K/UL (ref 1–4.8)
LYMPHOCYTES NFR BLD: 27.9 % (ref 22–41)
MCH RBC QN AUTO: 30.9 PG (ref 27–33)
MCHC RBC AUTO-ENTMCNC: 33.2 G/DL (ref 32.2–35.5)
MCV RBC AUTO: 93.2 FL (ref 81.4–97.8)
MONOCYTES # BLD AUTO: 0.38 K/UL (ref 0–0.85)
MONOCYTES NFR BLD AUTO: 6.6 % (ref 0–13.4)
NEUTROPHILS # BLD AUTO: 3.6 K/UL (ref 1.82–7.42)
NEUTROPHILS NFR BLD: 62.9 % (ref 44–72)
NRBC # BLD AUTO: 0 K/UL
NRBC BLD-RTO: 0 /100 WBC (ref 0–0.2)
PLATELET # BLD AUTO: 261 K/UL (ref 164–446)
PMV BLD AUTO: 12.7 FL (ref 9–12.9)
POTASSIUM SERPL-SCNC: 4.3 MMOL/L (ref 3.6–5.5)
PROGEST SERPL-MCNC: 9.17 NG/ML
PROT SERPL-MCNC: 6.8 G/DL (ref 6–8.2)
RBC # BLD AUTO: 4.72 M/UL (ref 4.2–5.4)
SODIUM SERPL-SCNC: 138 MMOL/L (ref 135–145)
T3 SERPL-MCNC: 129 NG/DL (ref 60–181)
T3FREE SERPL-MCNC: 3.34 PG/ML (ref 2–4.4)
T4 FREE SERPL-MCNC: 0.98 NG/DL (ref 0.93–1.7)
T4 SERPL-MCNC: 6.1 UG/DL (ref 4–12)
TSH SERPL-ACNC: 1.28 UIU/ML (ref 0.35–5.5)
VIT B12 SERPL-MCNC: 980 PG/ML (ref 211–911)
WBC # BLD AUTO: 5.7 K/UL (ref 4.8–10.8)

## 2024-11-20 PROCEDURE — 36415 COLL VENOUS BLD VENIPUNCTURE: CPT

## 2024-11-20 PROCEDURE — 84403 ASSAY OF TOTAL TESTOSTERONE: CPT

## 2024-11-20 PROCEDURE — 84270 ASSAY OF SEX HORMONE GLOBUL: CPT

## 2024-11-20 PROCEDURE — 84443 ASSAY THYROID STIM HORMONE: CPT

## 2024-11-20 PROCEDURE — 83525 ASSAY OF INSULIN: CPT

## 2024-11-20 PROCEDURE — 82642 DIHYDROTESTOSTERONE: CPT

## 2024-11-20 PROCEDURE — 84402 ASSAY OF FREE TESTOSTERONE: CPT

## 2024-11-20 PROCEDURE — 82728 ASSAY OF FERRITIN: CPT

## 2024-11-20 PROCEDURE — 84482 T3 REVERSE: CPT

## 2024-11-20 PROCEDURE — 85025 COMPLETE CBC W/AUTO DIFF WBC: CPT

## 2024-11-20 PROCEDURE — 84144 ASSAY OF PROGESTERONE: CPT

## 2024-11-20 PROCEDURE — 80053 COMPREHEN METABOLIC PANEL: CPT

## 2024-11-20 PROCEDURE — 84481 FREE ASSAY (FT-3): CPT

## 2024-11-20 PROCEDURE — 84480 ASSAY TRIIODOTHYRONINE (T3): CPT

## 2024-11-20 PROCEDURE — 82670 ASSAY OF TOTAL ESTRADIOL: CPT

## 2024-11-20 PROCEDURE — 83090 ASSAY OF HOMOCYSTEINE: CPT

## 2024-11-20 PROCEDURE — 83002 ASSAY OF GONADOTROPIN (LH): CPT

## 2024-11-20 PROCEDURE — 84439 ASSAY OF FREE THYROXINE: CPT

## 2024-11-20 PROCEDURE — 82746 ASSAY OF FOLIC ACID SERUM: CPT

## 2024-11-20 PROCEDURE — 82306 VITAMIN D 25 HYDROXY: CPT

## 2024-11-20 PROCEDURE — 82533 TOTAL CORTISOL: CPT

## 2024-11-20 PROCEDURE — 83036 HEMOGLOBIN GLYCOSYLATED A1C: CPT

## 2024-11-20 PROCEDURE — 82627 DEHYDROEPIANDROSTERONE: CPT

## 2024-11-20 PROCEDURE — 83001 ASSAY OF GONADOTROPIN (FSH): CPT

## 2024-11-20 PROCEDURE — 82607 VITAMIN B-12: CPT

## 2024-11-21 ENCOUNTER — APPOINTMENT (RX ONLY)
Dept: URBAN - METROPOLITAN AREA CLINIC 20 | Facility: CLINIC | Age: 64
Setting detail: DERMATOLOGY
End: 2024-11-21

## 2024-11-21 DIAGNOSIS — Z41.9 ENCOUNTER FOR PROCEDURE FOR PURPOSES OTHER THAN REMEDYING HEALTH STATE, UNSPECIFIED: ICD-10-CM

## 2024-11-21 LAB — INSULIN P FAST SERPL-ACNC: 6 UIU/ML (ref 3–25)

## 2024-11-21 PROCEDURE — ? FRAXEL

## 2024-11-22 LAB
ANDROSTANOLONE SERPL-MCNC: 97.2 PG/ML (ref 24–208)
T3REVERSE SERPL-MCNC: 11.6 NG/DL (ref 9–27)

## 2024-11-23 LAB
SHBG SERPL-SCNC: 74 NMOL/L (ref 17–125)
TESTOST FREE SERPL-MCNC: 3.3 PG/ML (ref 0.6–3.8)
TESTOST SERPL-MCNC: 33 NG/DL (ref 5–32)

## 2024-12-10 ENCOUNTER — APPOINTMENT (OUTPATIENT)
Dept: URBAN - METROPOLITAN AREA CLINIC 20 | Facility: CLINIC | Age: 64
Setting detail: DERMATOLOGY
End: 2024-12-10

## 2024-12-10 DIAGNOSIS — Z41.9 ENCOUNTER FOR PROCEDURE FOR PURPOSES OTHER THAN REMEDYING HEALTH STATE, UNSPECIFIED: ICD-10-CM

## 2024-12-10 PROCEDURE — ? DIAMONDGLOW

## 2024-12-10 ASSESSMENT — LOCATION SIMPLE DESCRIPTION DERM
LOCATION SIMPLE: LEFT FOREHEAD
LOCATION SIMPLE: RIGHT FOREHEAD
LOCATION SIMPLE: CHIN
LOCATION SIMPLE: LEFT CHEEK
LOCATION SIMPLE: RIGHT CHEEK

## 2024-12-10 ASSESSMENT — LOCATION ZONE DERM: LOCATION ZONE: FACE

## 2024-12-10 ASSESSMENT — LOCATION DETAILED DESCRIPTION DERM
LOCATION DETAILED: RIGHT INFERIOR MEDIAL FOREHEAD
LOCATION DETAILED: LEFT FOREHEAD
LOCATION DETAILED: RIGHT CHIN
LOCATION DETAILED: LEFT INFERIOR CENTRAL MALAR CHEEK
LOCATION DETAILED: RIGHT INFERIOR LATERAL MALAR CHEEK
LOCATION DETAILED: RIGHT LATERAL FOREHEAD

## 2024-12-10 NOTE — PROCEDURE: DIAMONDGLOW
Price (Use Numbers Only, No Special Characters Or $): 257
Vacuum Pressure In Inches: 10
Additional Solution Used: Hyaluronic acid
Consent: Written consent obtained, risks reviewed including but not limited to crusting, scabbing, blistering, scarring, darker or lighter pigmentary change, bruising, and/or incomplete response.
Number Of Passes: 4
Intelliflow Setting: 100%
Body Treatment Head Used?: Not Used
Post-Care Instructions: I reviewed with the patient in detail post-care instructions. Patient should stay away from the sun and wear sun protection until treated areas are fully healed.
Detail Level: Zone

## 2025-01-08 DIAGNOSIS — Z12.31 ENCOUNTER FOR SCREENING MAMMOGRAM FOR MALIGNANT NEOPLASM OF BREAST: ICD-10-CM

## 2025-01-08 DIAGNOSIS — Z80.3 FAMILY HISTORY OF MALIGNANT NEOPLASM OF BREAST: ICD-10-CM

## 2025-01-09 ENCOUNTER — APPOINTMENT (OUTPATIENT)
Dept: URBAN - METROPOLITAN AREA CLINIC 20 | Facility: CLINIC | Age: 65
Setting detail: DERMATOLOGY
End: 2025-01-09

## 2025-01-09 DIAGNOSIS — L90.8 OTHER ATROPHIC DISORDERS OF SKIN: ICD-10-CM

## 2025-01-09 PROCEDURE — ? EMFACE

## 2025-01-09 NOTE — PROCEDURE: EMFACE
Detail Level: Zone
Duration (Min): 20
Treatment Administered By (Optional): Hawa Guzmán RN
Rf (%): 100
Add Another Setting?: no
Treatment Number: 1
Add Another Setting?: yes
Consent: Written consent obtained. Risks and benefits reviewed.  Explained to patient that the procedure is cosmetic in nature and not covered by insurance.  Explained that results are not guaranteed and multiple treatments may be needed to achieve desired results.
Hifes (%): 74
Number Of Prepaid Treatments (Will Not Render If 0): 0

## 2025-01-16 ENCOUNTER — APPOINTMENT (OUTPATIENT)
Dept: URBAN - METROPOLITAN AREA CLINIC 20 | Facility: CLINIC | Age: 65
Setting detail: DERMATOLOGY
End: 2025-01-16

## 2025-01-16 DIAGNOSIS — L90.8 OTHER ATROPHIC DISORDERS OF SKIN: ICD-10-CM

## 2025-01-16 PROCEDURE — ? EMFACE

## 2025-01-16 NOTE — PROCEDURE: EMFACE
Detail Level: Zone
Duration (Min): 20
Treatment Administered By (Optional): Hawa Guzmán RN
Rf (%): 100
Add Another Setting?: no
Treatment Number: 2
Add Another Setting?: yes
Consent: Written consent obtained. Risks and benefits reviewed.  Explained to patient that the procedure is cosmetic in nature and not covered by insurance.  Explained that results are not guaranteed and multiple treatments may be needed to achieve desired results.
Hifes (%): 60
Number Of Prepaid Treatments (Will Not Render If 0): 0

## 2025-01-21 ENCOUNTER — APPOINTMENT (OUTPATIENT)
Dept: URBAN - METROPOLITAN AREA CLINIC 20 | Facility: CLINIC | Age: 65
Setting detail: DERMATOLOGY
End: 2025-01-21

## 2025-01-21 DIAGNOSIS — Z41.9 ENCOUNTER FOR PROCEDURE FOR PURPOSES OTHER THAN REMEDYING HEALTH STATE, UNSPECIFIED: ICD-10-CM

## 2025-01-21 PROCEDURE — ? DIAMONDGLOW

## 2025-01-21 ASSESSMENT — LOCATION DETAILED DESCRIPTION DERM
LOCATION DETAILED: LEFT CHIN
LOCATION DETAILED: RIGHT CENTRAL MALAR CHEEK
LOCATION DETAILED: LEFT INFERIOR CENTRAL MALAR CHEEK
LOCATION DETAILED: LEFT FOREHEAD
LOCATION DETAILED: RIGHT FOREHEAD

## 2025-01-21 ASSESSMENT — LOCATION SIMPLE DESCRIPTION DERM
LOCATION SIMPLE: LEFT FOREHEAD
LOCATION SIMPLE: LEFT CHEEK
LOCATION SIMPLE: RIGHT FOREHEAD
LOCATION SIMPLE: RIGHT CHEEK
LOCATION SIMPLE: CHIN

## 2025-01-21 ASSESSMENT — LOCATION ZONE DERM: LOCATION ZONE: FACE

## 2025-01-21 NOTE — PROCEDURE: DIAMONDGLOW
Body Treatment Head Used?: Not Used
Intelliflow Setting: 100%
Solution Used: TNS Advanced
Price (Use Numbers Only, No Special Characters Or $): 341
Post-Care Instructions: I reviewed with the patient in detail post-care instructions. Patient should stay away from the sun and wear sun protection until treated areas are fully healed.
Detail Level: Zone
Vacuum Pressure In Inches: 10
Consent: Written consent obtained, risks reviewed including but not limited to crusting, scabbing, blistering, scarring, darker or lighter pigmentary change, bruising, and/or incomplete response.
Number Of Passes: 4

## 2025-01-21 NOTE — HPI: COSMETIC (FACIAL)
Have You Had A Facial Before?: has had a previous facial
When Outside In The Sun, Do You...: always burns, never tans
When Was Your Last Facial?: 12/24

## 2025-01-22 ENCOUNTER — HOSPITAL ENCOUNTER (OUTPATIENT)
Facility: MEDICAL CENTER | Age: 65
End: 2025-01-22
Attending: INTERNAL MEDICINE
Payer: COMMERCIAL

## 2025-01-22 ENCOUNTER — OFFICE VISIT (OUTPATIENT)
Dept: SLEEP MEDICINE | Facility: MEDICAL CENTER | Age: 65
End: 2025-01-22
Attending: NURSE PRACTITIONER
Payer: COMMERCIAL

## 2025-01-22 VITALS
HEIGHT: 66 IN | SYSTOLIC BLOOD PRESSURE: 102 MMHG | HEART RATE: 66 BPM | BODY MASS INDEX: 25.39 KG/M2 | OXYGEN SATURATION: 95 % | DIASTOLIC BLOOD PRESSURE: 60 MMHG | WEIGHT: 158 LBS

## 2025-01-22 DIAGNOSIS — C34.91 ADENOCARCINOMA OF RIGHT LUNG (HCC): ICD-10-CM

## 2025-01-22 DIAGNOSIS — Z98.890 POST-OPERATIVE STATE: ICD-10-CM

## 2025-01-22 DIAGNOSIS — R06.02 SHORTNESS OF BREATH: ICD-10-CM

## 2025-01-22 DIAGNOSIS — Z78.9 NONSMOKER: ICD-10-CM

## 2025-01-22 DIAGNOSIS — D49.89 THYMOMA: ICD-10-CM

## 2025-01-22 PROCEDURE — 99211 OFF/OP EST MAY X REQ PHY/QHP: CPT | Performed by: NURSE PRACTITIONER

## 2025-01-22 PROCEDURE — 94761 N-INVAS EAR/PLS OXIMETRY MLT: CPT | Performed by: NURSE PRACTITIONER

## 2025-01-22 PROCEDURE — 82274 ASSAY TEST FOR BLOOD FECAL: CPT

## 2025-01-22 PROCEDURE — 99213 OFFICE O/P EST LOW 20 MIN: CPT | Performed by: INTERNAL MEDICINE

## 2025-01-22 ASSESSMENT — PATIENT HEALTH QUESTIONNAIRE - PHQ9: CLINICAL INTERPRETATION OF PHQ2 SCORE: 0

## 2025-01-22 ASSESSMENT — FIBROSIS 4 INDEX: FIB4 SCORE: 1.22

## 2025-01-22 NOTE — PROCEDURES
Multi-Ox Readings  Multi Ox #1 Room air   O2 sat % at rest 96   O2 sat % on exertion 93   O2 sat average on exertion     Multi Ox #2     O2 sat % at rest     O2 sat % on exertion     O2 sat average on exertion       Oxygen Use     Oxygen Frequency     Duration of need     Is the patient mobile within the home?     CPAP Use?     BIPAP Use?     Servo Titration

## 2025-01-22 NOTE — PROGRESS NOTES
"Chief Complaint   Patient presents with    Follow-Up     Dx/Last seen: Lung nodule  / 4/10/24 Sanjuana Talavera     Results     PFT        HPI:  Lizy Saleem is a 64 y.o. year old female here today for follow-up on lung nodule and mediastinal mass. 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 Ganser.  Last OV 4/10/24 with Dr. Talavera     [facility] stGstrstastdstest:st st1st PFT 2024 noted normal findings with slight reduction in FVC compared to PFT prior to surgery.    Per Dr. Talavera's note for hx:  \"She was referred to me for incidental finding of 13 cm x 7 cm anterior mediastinal mass with passive compression on L lung. Her   of complications of hardware infection and covid 19 last fall and the pt experienced a 30 lb weight loss. CT abd/pelvis from 23 showed no concerning findings with respect to GI tract/abdomen/pelvis however it did catch the inferior portion of a large mediastinal mass which was f/u with CT chest on 23. This showed left sided anterior mediastinal mass up to 13 cm in max dimension w/o bronchial or vascular compromise. She does have 1.2 cm GGO in the inferior RUL. No LAD. She denies f/c, night sweats. No chest pain. No edema. No SOB. No cough. She was referred to \[facility]\"" oncology and [facility] by Dr. Fragoso with GI and to me by PCP. Since our first visit, she had PFT in February showing normal lung function. She had CT-guided biopsy of mediastinal mass in January which showed thymoma AB type. She then underwent PET also in January which showed FDG update in anterior mediastinal mass but also in RUL nodule. She underwent surgical resection of both the thymoma and RUL nodule last month. She tolerated procedure well and both lesions were removed with clear surgical margins. RUL was c/w adenoCa. She is being followed by oncology with repeat imaging scheduled for September. She was warned that there may be injury to her " "phrenic nerve after surgery but overall she fells well, no CP or SOB.\"    Interval Events:  1/22/25: She reports shortness of breath with talking and on the phone.  She is going to the gym 3 times a week and when on the treadmill talking to her girlfriend she will get out of breath but the rest of exercise she is fine.  She walks her dog 45 minutes daily.  She denies mouth breathing.  She has a history of sleep apnea and using dental device at night.  She feels she is sleeping better at this time after the loss of her  1 year ago.  She denies waking with headaches or shortness of breath.  Multi ox in office today was normal and at rest 95%.  Continues with surveillance imaging through oncology.    ROS: As per HPI and otherwise negative if not stated.    Past Medical History:   Diagnosis Date    Acquired hypothyroidism 11/01/2018    Anesthesia     PONV    Anxiety reaction 11/01/2018    Barnard's esophagus     Bowel habit changes     diarrhea    Bronchitis 2004    Lasted a month    Cancer (HCC) 2024    thymoma    Chronic diarrhea 11/18/2020    Chronic midline low back pain without sciatica 11/18/2020    Family history of abdominal aortic aneurysm 02/08/2019    Herpes labialis 02/08/2019    Evans's neuroma of left foot 11/08/2018    PONV (postoperative nausea and vomiting) 2000 and 2003    For bunionectomies    Sleep apnea 2012    Did sleep study with dentist wear dental appliance    Snoring        Past Surgical History:   Procedure Laterality Date    STERNOTMY WITH BILATERAL WEDGE Right 3/11/2024    Procedure: STERNOTOMY, RESECTION THYMOMA, WEDGE RESECTION RIGHT UPPER LOBE;  Surgeon: John H Ganser, M.D.;  Location: [facility];  Service: General    HYSTEROSCOPY WITH VIDEO DIAGNOSTIC  10/23/2015    Procedure: HYSTEROSCOPY WITH VIDEO DIAGNOSTIC;  Surgeon: Lesli Frias M.D.;  Location: [facility];  Service:     DILATION AND CURETTAGE  10/23/2015    Procedure: DILATION AND CURETTAGE;  " Surgeon: Lesli Frias M.D.;  Location: SURGERY [facility];  Service:     BUNIONECTOMY Right 01/01/2003    BUNIONECTOMY Left 01/01/2000    ARTHROSCOPY, KNEE Left 01/01/1992    OTHER  1991    Left knee meniscus repair       Family History   Problem Relation Age of Onset    Breast Cancer Paternal Aunt     Cancer Father         Prostate    Hyperlipidemia Father     Hypertension Father     Other Father         multiple aneurisms    Thyroid Mother     Cancer Mother         Melanoma left eye and on leg    Lung Disease Mother     Cancer Cousin     Cancer Maternal Uncle         Stomach cancer    Cancer Paternal Aunt         Masectomy    Cancer Sister         Thyroid cancer       Social History     Socioeconomic History    Marital status:      Spouse name: Not on file    Number of children: Not on file    Years of education: Not on file    Highest education level: Associate degree: occupational, technical, or vocational program   Occupational History    Not on file   Tobacco Use    Smoking status: Never    Smokeless tobacco: Never   Vaping Use    Vaping status: Never Used   Substance and Sexual Activity    Alcohol use: Not Currently    Drug use: No    Sexual activity: Not Currently     Partners: Male     Birth control/protection: Post-Menopausal     Comment: , retired from [employer]   Other Topics Concern    Not on file   Social History Narrative    Not on file     Social Drivers of Health     Financial Resource Strain: Low Risk  (4/12/2024)    Overall Financial Resource Strain (CARDIA)     Difficulty of Paying Living Expenses: Not hard at all   Food Insecurity: No Food Insecurity (4/12/2024)    Hunger Vital Sign     Worried About Running Out of Food in the Last Year: Never true     Ran Out of Food in the Last Year: Never true   Transportation Needs: No Transportation Needs (4/12/2024)    PRAPARE - Transportation     Lack of Transportation (Medical): No     Lack of Transportation  "(Non-Medical): No   Physical Activity: Sufficiently Active (4/12/2024)    Exercise Vital Sign     Days of Exercise per Week: 5 days     Minutes of Exercise per Session: 60 min   Stress: No Stress Concern Present (4/12/2024)    [employer] - Occupational Stress Questionnaire     Feeling of Stress : Only a little   Social Connections: Moderately Isolated (4/12/2024)    Social Connection and Isolation Panel [NHANES]     Frequency of Communication with Friends and Family: More than three times a week     Frequency of Social Gatherings with Friends and Family: More than three times a week     Attends Jain Services: Never     Active Member of Clubs or Organizations: Yes     Attends Club or Organization Meetings: 1 to 4 times per year     Marital Status:    Intimate Partner Violence: Not on file   Housing Stability: Low Risk  (4/12/2024)    Housing Stability Vital Sign     Unable to Pay for Housing in the Last Year: No     Number of Places Lived in the Last Year: 1     Unstable Housing in the Last Year: No       Allergies as of 01/22/2025 - Reviewed 01/22/2025   Allergen Reaction Noted    Latex Rash 11/18/2020        Vitals:  /60 (BP Location: Left arm, Patient Position: Sitting, BP Cuff Size: Adult)   Pulse 66   Ht 1.676 m (5' 6\")   Wt 71.7 kg (158 lb)   SpO2 95%     Current medications as of today   Current Outpatient Medications   Medication Sig Dispense Refill    levothyroxine (SYNTHROID) 75 MCG Tab TAKE 1 TABLET BY MOUTH EVERY DAY IN THE MORNING ON AN EMPTY STOMACH 90 Tablet 3    NON SPECIFIED 5 mg 2 times a day. C-Testosterone   Apply 2 clicks topically at night      PROGESTERONE PO Place 100 mg under the tongue every day. C-progesterone      valacyclovir (VALTREX) 1 GM Tab TAKE 1 TABLET BY MOUTH EVERY DAY; DOUBLE THE DOSE FOR 7 DAYS AS NEEDED FOR OUTBREAKS 100 Tablet 3    ARMOUR THYROID 60 MG Tab TAKE 1 TABLET BY MOUTH EVERY DAY** NOT COVERED      omeprazole " (PRILOSEC) 20 MG delayed-release capsule Take 20 mg by mouth every morning.      B Complex Vitamins (B COMPLEX PO) Take 1 Tablet by mouth every morning.      Ascorbic Acid (VITAMIN C) 1000 MG Tab Take 1,000 mg by mouth 2 times a day.      Omega-3 Fatty Acids (OMEGA 3 PO) Take 1 Tablet by mouth every morning.      Multiple Minerals-Vitamins (CALCIUM CITRATE PLUS/MAGNESIUM PO) Take 1 Tablet by mouth every morning.      Probiotic Product (PRO-BIOTIC BLEND PO) Take 1 Tablet by mouth every morning.      NON SPECIFIED C-E2/E3 25 mg/mlTake 0.1ml SL daily (Patient taking differently: C-E2/E3 25 mg/mlTake 0.1ml SL daily. Nightly except Sunday.) 5 Each 1     No current facility-administered medications for this visit.         Physical Exam:   Gen:           Alert and oriented, No apparent distress. Mood and affect appropriate, normal interaction with examiner.  Eyes:          PERRL, EOM intact, sclere white, conjunctive moist.  Ears:          Not examined.  Hearing:     Grossly intact.  Nose:          Normal, no lesions or deformities.  Dentition:    Not examined.  Oropharynx:   Not examined.  Mallampati Classification: Not examined.  Neck:        Supple, trachea midline, no masses.  Respiratory Effort: No intercostal retractions or use of accessory muscles.   Lung Auscultation:      Clear to auscultation bilaterally; no rales, rhonchi or wheezing.  CV:            Regular rate and rhythm. No murmurs, rubs or gallops.  Abd:           Not examined.   Lymphadenopathy: Not examined.  Gait and Station: Normal.  Digits and Nails: No clubbing, cyanosis, petechiae, or nodes.   Cranial Nerves: II-XII grossly intact.  Skin:        No rashes, lesions or ulcers noted.               Ext:           No cyanosis or edema.      Assessment:  1. Shortness of breath  PULMONARY FUNCTION TESTS -Test requested: Complete Pulmonary Function Test; Include MIPS/MEPS? No    Multiple Oximetry    Multiple Oximetry      2. Post-operative state  PULMONARY  FUNCTION TESTS -Test requested: Complete Pulmonary Function Test; Include MIPS/MEPS? No    Multiple Oximetry    Multiple Oximetry      3. Adenocarcinoma of right lung (HCC)        4. Thymoma        5. BMI 25.0-25.9,adult        6. Nonsmoker                 Immunizations:    Flu:recommend  Pneumovax 23:not due  Prevnar 13:not due  PCV 20: not due  COVID-19: 2022    Plan:  Shortness of breath appears to be related to talking moments only.  Multi oximetry in office today was normal.  Recommend updating PFT now and if normal findings will follow-up as needed.   PFT now; may send results via [facility]   Multi ox  Patient has recovered well.  Follow-up with oncology for ongoing surveillance monitoring of adenocarcinoma of right lung of thymoma after resection.  Encourage healthy diet regular activity for conditioning.  Follow-up as needed.    Please note that this dictation was created using voice recognition software. I have made every reasonable attempt to correct obvious errors, but it is possible there are errors of grammar and possibly content that I did not discover before finalizing the note.

## 2025-01-23 ENCOUNTER — APPOINTMENT (OUTPATIENT)
Dept: URBAN - METROPOLITAN AREA CLINIC 20 | Facility: CLINIC | Age: 65
Setting detail: DERMATOLOGY
End: 2025-01-23

## 2025-01-23 DIAGNOSIS — L90.8 OTHER ATROPHIC DISORDERS OF SKIN: ICD-10-CM

## 2025-01-23 PROCEDURE — ? EMFACE

## 2025-01-23 NOTE — PROCEDURE: EMFACE
Detail Level: Zone
Duration (Min): 20
Treatment Administered By (Optional): Hawa Guzmán RN
Rf (%): 100
Add Another Setting?: no
Treatment Number: 3
Add Another Setting?: yes
Consent: Written consent obtained. Risks and benefits reviewed.  Explained to patient that the procedure is cosmetic in nature and not covered by insurance.  Explained that results are not guaranteed and multiple treatments may be needed to achieve desired results.
Hifes (%): 60
Number Of Prepaid Treatments (Will Not Render If 0): 0

## 2025-01-30 ENCOUNTER — APPOINTMENT (OUTPATIENT)
Dept: URBAN - METROPOLITAN AREA CLINIC 20 | Facility: CLINIC | Age: 65
Setting detail: DERMATOLOGY
End: 2025-01-30

## 2025-01-30 DIAGNOSIS — L90.8 OTHER ATROPHIC DISORDERS OF SKIN: ICD-10-CM

## 2025-01-30 LAB — IMM ASSAY OCC BLD FITOB: NEGATIVE

## 2025-01-30 PROCEDURE — ? EMFACE

## 2025-02-13 ENCOUNTER — HOSPITAL ENCOUNTER (OUTPATIENT)
Dept: LAB | Facility: MEDICAL CENTER | Age: 65
End: 2025-02-13
Attending: FAMILY MEDICINE
Payer: COMMERCIAL

## 2025-02-13 ENCOUNTER — HOSPITAL ENCOUNTER (OUTPATIENT)
Dept: LAB | Facility: MEDICAL CENTER | Age: 65
End: 2025-02-13
Attending: NURSE PRACTITIONER
Payer: COMMERCIAL

## 2025-02-13 LAB
AMYLASE SERPL-CCNC: 51 U/L (ref 20–103)
GGT SERPL-CCNC: 19 U/L (ref 7–34)
H PYLORI AG STL QL IA: NOT DETECTED
LACTOFERRIN STL QL IA: NEGATIVE
LIPASE SERPL-CCNC: 42 U/L (ref 11–82)

## 2025-02-13 PROCEDURE — 87209 SMEAR COMPLEX STAIN: CPT

## 2025-02-13 PROCEDURE — 82977 ASSAY OF GGT: CPT

## 2025-02-13 PROCEDURE — 83690 ASSAY OF LIPASE: CPT

## 2025-02-13 PROCEDURE — 87177 OVA AND PARASITES SMEARS: CPT

## 2025-02-13 PROCEDURE — 87338 HPYLORI STOOL AG IA: CPT

## 2025-02-13 PROCEDURE — 82150 ASSAY OF AMYLASE: CPT

## 2025-02-13 PROCEDURE — 83630 LACTOFERRIN FECAL (QUAL): CPT

## 2025-02-13 PROCEDURE — 36415 COLL VENOUS BLD VENIPUNCTURE: CPT

## 2025-02-13 PROCEDURE — 82542 COL CHROMOTOGRAPHY QUAL/QUAN: CPT

## 2025-02-14 DIAGNOSIS — R53.83 FATIGUE, UNSPECIFIED TYPE: ICD-10-CM

## 2025-02-14 NOTE — Clinical Note
Member Name: Lizy Saleem   Member Number: [id number]   Reference Number: [id number]   Approved Services: MRI/CAT Scan   Approved Service Dates: 02/13/2025 - 06/14/2025   Requesting Provider: Lucero Gaffney   Requested Provider: [facility]     Dear Lizy Kelsy Saleem:     The following medical service(s) requested by Lucero Gaffney have been approved:    Number of Services: 1  Description: MRI/CAT Scan  CT scan of abdomen before and after contrast     The services should be provided by [facility] no later than 06/14/2025. Please contact the provider listed below with any questions.     Provider Information:  [facility]  [phone]    Your plan benefit may require a deductible, co-payment or coinsurance for these services. This authorization does not guarantee [facility] will pay the claim for services that you receive. Payment by [facility] for these services is subject to the terms of your Evidence of Coverage or Summary Plan Description, your eligibility at the time of service, and confirmation of benefit coverage.    For any questions or additional information, please contact Customer Service:    [facility]  Customer Service: [phone] or toll free [phone]  [employer] users dial: [phone]   Call Center Hours: Mon - Fri 7 AM to 8 PM PST   Office Hours: Mon - Fri 8 AM to 5 PM [facility]   E-mail: [email or web address]   Website: [email or web address]       This information is available for free in other languages. Please contact Customer Service at the phone number above for more information. [facility] complies with applicable Federal civil rights laws and does not discriminate on the basis of race, color, national origin, age, disability or sex.      Sincerely,     Healthcare Utilization Management Department     Cc: [facility]   Lucero Gaffney

## 2025-02-15 ENCOUNTER — HOSPITAL ENCOUNTER (OUTPATIENT)
Dept: LAB | Facility: MEDICAL CENTER | Age: 65
End: 2025-02-15
Payer: COMMERCIAL

## 2025-02-15 DIAGNOSIS — C34.91 ADENOCARCINOMA OF RIGHT LUNG (HCC): ICD-10-CM

## 2025-02-15 LAB
ALBUMIN SERPL BCP-MCNC: 4.3 G/DL (ref 3.2–4.9)
ALBUMIN/GLOB SERPL: 1.5 G/DL
ALP SERPL-CCNC: 60 U/L (ref 30–99)
ALT SERPL-CCNC: 15 U/L (ref 2–50)
ANION GAP SERPL CALC-SCNC: 12 MMOL/L (ref 7–16)
AST SERPL-CCNC: 21 U/L (ref 12–45)
BASOPHILS # BLD AUTO: 0.6 % (ref 0–1.8)
BASOPHILS # BLD: 0.03 K/UL (ref 0–0.12)
BILIRUB SERPL-MCNC: 0.3 MG/DL (ref 0.1–1.5)
BUN SERPL-MCNC: 14 MG/DL (ref 8–22)
CALCIUM ALBUM COR SERPL-MCNC: 9.1 MG/DL (ref 8.5–10.5)
CALCIUM SERPL-MCNC: 9.3 MG/DL (ref 8.5–10.5)
CHLORIDE SERPL-SCNC: 102 MMOL/L (ref 96–112)
CO2 SERPL-SCNC: 25 MMOL/L (ref 20–33)
CREAT SERPL-MCNC: 0.83 MG/DL (ref 0.5–1.4)
EOSINOPHIL # BLD AUTO: 0.12 K/UL (ref 0–0.51)
EOSINOPHIL NFR BLD: 2.4 % (ref 0–6.9)
ERYTHROCYTE [DISTWIDTH] IN BLOOD BY AUTOMATED COUNT: 46.5 FL (ref 35.9–50)
GFR SERPLBLD CREATININE-BSD FMLA CKD-EPI: 79 ML/MIN/1.73 M 2
GLOBULIN SER CALC-MCNC: 2.8 G/DL (ref 1.9–3.5)
GLUCOSE SERPL-MCNC: 95 MG/DL (ref 65–99)
HCT VFR BLD AUTO: 44.9 % (ref 37–47)
HGB BLD-MCNC: 14.7 G/DL (ref 12–16)
IMM GRANULOCYTES # BLD AUTO: 0.01 K/UL (ref 0–0.11)
IMM GRANULOCYTES NFR BLD AUTO: 0.2 % (ref 0–0.9)
LYMPHOCYTES # BLD AUTO: 1.55 K/UL (ref 1–4.8)
LYMPHOCYTES NFR BLD: 31.4 % (ref 22–41)
MCH RBC QN AUTO: 31.1 PG (ref 27–33)
MCHC RBC AUTO-ENTMCNC: 32.7 G/DL (ref 32.2–35.5)
MCV RBC AUTO: 94.9 FL (ref 81.4–97.8)
MONOCYTES # BLD AUTO: 0.3 K/UL (ref 0–0.85)
MONOCYTES NFR BLD AUTO: 6.1 % (ref 0–13.4)
NEUTROPHILS # BLD AUTO: 2.92 K/UL (ref 1.82–7.42)
NEUTROPHILS NFR BLD: 59.3 % (ref 44–72)
NRBC # BLD AUTO: 0 K/UL
NRBC BLD-RTO: 0 /100 WBC (ref 0–0.2)
PLATELET # BLD AUTO: 250 K/UL (ref 164–446)
PMV BLD AUTO: 12.9 FL (ref 9–12.9)
POTASSIUM SERPL-SCNC: 4 MMOL/L (ref 3.6–5.5)
PROT SERPL-MCNC: 7.1 G/DL (ref 6–8.2)
RBC # BLD AUTO: 4.73 M/UL (ref 4.2–5.4)
SODIUM SERPL-SCNC: 139 MMOL/L (ref 135–145)
WBC # BLD AUTO: 4.9 K/UL (ref 4.8–10.8)

## 2025-02-15 PROCEDURE — 80053 COMPREHEN METABOLIC PANEL: CPT

## 2025-02-15 PROCEDURE — 36415 COLL VENOUS BLD VENIPUNCTURE: CPT

## 2025-02-15 PROCEDURE — 85025 COMPLETE CBC W/AUTO DIFF WBC: CPT

## 2025-02-21 ENCOUNTER — HOSPITAL ENCOUNTER (OUTPATIENT)
Dept: RADIOLOGY | Facility: MEDICAL CENTER | Age: 65
End: 2025-02-21
Attending: NEUROLOGICAL SURGERY
Payer: COMMERCIAL

## 2025-02-21 DIAGNOSIS — D18.02 HEMANGIOMA OF INTRACRANIAL STRUCTURES (HCC): ICD-10-CM

## 2025-02-21 LAB — OVA AND PARASITE, FECAL INTERPRETATION Q0595: NEGATIVE

## 2025-02-21 PROCEDURE — 700117 HCHG RX CONTRAST REV CODE 255: Mod: JZ | Performed by: NEUROLOGICAL SURGERY

## 2025-02-21 PROCEDURE — A9579 GAD-BASE MR CONTRAST NOS,1ML: HCPCS | Mod: JZ | Performed by: NEUROLOGICAL SURGERY

## 2025-02-21 PROCEDURE — 70553 MRI BRAIN STEM W/O & W/DYE: CPT

## 2025-02-21 RX ADMIN — GADOTERIDOL 15 ML: 279.3 INJECTION, SOLUTION INTRAVENOUS at 12:56

## 2025-02-24 ENCOUNTER — APPOINTMENT (OUTPATIENT)
Dept: RADIOLOGY | Facility: MEDICAL CENTER | Age: 65
End: 2025-02-24
Attending: PHYSICIAN ASSISTANT
Payer: COMMERCIAL

## 2025-02-24 DIAGNOSIS — Z12.31 ENCOUNTER FOR SCREENING MAMMOGRAM FOR MALIGNANT NEOPLASM OF BREAST: ICD-10-CM

## 2025-02-24 DIAGNOSIS — Z80.3 FAMILY HISTORY OF MALIGNANT NEOPLASM OF BREAST: ICD-10-CM

## 2025-02-24 PROCEDURE — 700117 HCHG RX CONTRAST REV CODE 255: Mod: JZ

## 2025-02-24 PROCEDURE — 77049 MRI BREAST C-+ W/CAD BI: CPT

## 2025-02-24 PROCEDURE — A9579 GAD-BASE MR CONTRAST NOS,1ML: HCPCS | Mod: JZ

## 2025-02-24 RX ADMIN — GADOTERIDOL 15 ML: 279.3 INJECTION, SOLUTION INTRAVENOUS at 11:08

## 2025-02-25 ENCOUNTER — HOSPITAL ENCOUNTER (OUTPATIENT)
Dept: RADIOLOGY | Facility: MEDICAL CENTER | Age: 65
End: 2025-02-25
Attending: NURSE PRACTITIONER
Payer: COMMERCIAL

## 2025-02-25 DIAGNOSIS — K22.89 ESOPHAGEAL HEMORRHAGE: ICD-10-CM

## 2025-02-25 DIAGNOSIS — R63.0 ANOREXIA: ICD-10-CM

## 2025-02-25 DIAGNOSIS — Z80.3 FAMILY HISTORY OF MALIGNANT NEOPLASM OF BREAST: ICD-10-CM

## 2025-02-25 DIAGNOSIS — R10.9 STOMACH ACHE: ICD-10-CM

## 2025-02-25 DIAGNOSIS — C34.91 ADENOCARCINOMA OF RIGHT LUNG (HCC): ICD-10-CM

## 2025-02-25 PROCEDURE — 74170 CT ABD WO CNTRST FLWD CNTRST: CPT

## 2025-02-25 PROCEDURE — 700117 HCHG RX CONTRAST REV CODE 255: Performed by: NURSE PRACTITIONER

## 2025-02-25 RX ADMIN — IOHEXOL 100 ML: 350 INJECTION, SOLUTION INTRAVENOUS at 10:14

## 2025-02-26 ENCOUNTER — APPOINTMENT (OUTPATIENT)
Dept: URBAN - METROPOLITAN AREA CLINIC 4 | Facility: CLINIC | Age: 65
Setting detail: DERMATOLOGY
End: 2025-02-26

## 2025-02-26 ENCOUNTER — HOSPITAL ENCOUNTER (OUTPATIENT)
Dept: RADIOLOGY | Facility: MEDICAL CENTER | Age: 65
End: 2025-02-26
Attending: PHYSICIAN ASSISTANT
Payer: COMMERCIAL

## 2025-02-26 DIAGNOSIS — L82.1 OTHER SEBORRHEIC KERATOSIS: ICD-10-CM

## 2025-02-26 DIAGNOSIS — D22 MELANOCYTIC NEVI: ICD-10-CM

## 2025-02-26 DIAGNOSIS — D18.0 HEMANGIOMA: ICD-10-CM

## 2025-02-26 DIAGNOSIS — L57.0 ACTINIC KERATOSIS: ICD-10-CM

## 2025-02-26 DIAGNOSIS — Z12.31 VISIT FOR SCREENING MAMMOGRAM: ICD-10-CM

## 2025-02-26 DIAGNOSIS — L81.4 OTHER MELANIN HYPERPIGMENTATION: ICD-10-CM

## 2025-02-26 DIAGNOSIS — Z71.89 OTHER SPECIFIED COUNSELING: ICD-10-CM

## 2025-02-26 PROBLEM — D22.5 MELANOCYTIC NEVI OF TRUNK: Status: ACTIVE | Noted: 2025-02-26

## 2025-02-26 PROBLEM — D18.01 HEMANGIOMA OF SKIN AND SUBCUTANEOUS TISSUE: Status: ACTIVE | Noted: 2025-02-26

## 2025-02-26 LAB — MISCELLANEOUS LAB RESULT MISCLAB: NORMAL

## 2025-02-26 PROCEDURE — ? COUNSELING

## 2025-02-26 PROCEDURE — ? SUNSCREEN RECOMMENDATIONS

## 2025-02-26 PROCEDURE — 77067 SCR MAMMO BI INCL CAD: CPT

## 2025-02-26 PROCEDURE — ? OBSERVATION

## 2025-02-26 PROCEDURE — 17003 DESTRUCT PREMALG LES 2-14: CPT

## 2025-02-26 PROCEDURE — 99213 OFFICE O/P EST LOW 20 MIN: CPT | Mod: 25

## 2025-02-26 PROCEDURE — 17000 DESTRUCT PREMALG LESION: CPT

## 2025-02-26 PROCEDURE — ? ADDITIONAL NOTES

## 2025-02-26 PROCEDURE — ? LIQUID NITROGEN

## 2025-02-26 ASSESSMENT — LOCATION DETAILED DESCRIPTION DERM
LOCATION DETAILED: RIGHT POSTERIOR SHOULDER
LOCATION DETAILED: SUPERIOR LUMBAR SPINE
LOCATION DETAILED: INFERIOR THORACIC SPINE
LOCATION DETAILED: RIGHT RADIAL DORSAL HAND
LOCATION DETAILED: RIGHT DORSAL INDEX METACARPOPHALANGEAL JOINT
LOCATION DETAILED: RIGHT PROXIMAL DORSAL FOREARM
LOCATION DETAILED: LEFT POSTERIOR SHOULDER
LOCATION DETAILED: EPIGASTRIC SKIN
LOCATION DETAILED: RIGHT BUTTOCK

## 2025-02-26 ASSESSMENT — LOCATION SIMPLE DESCRIPTION DERM
LOCATION SIMPLE: UPPER BACK
LOCATION SIMPLE: RIGHT SHOULDER
LOCATION SIMPLE: RIGHT BUTTOCK
LOCATION SIMPLE: RIGHT HAND
LOCATION SIMPLE: RIGHT FOREARM
LOCATION SIMPLE: ABDOMEN
LOCATION SIMPLE: LOWER BACK
LOCATION SIMPLE: LEFT SHOULDER

## 2025-02-26 ASSESSMENT — LOCATION ZONE DERM
LOCATION ZONE: ARM
LOCATION ZONE: TRUNK
LOCATION ZONE: HAND

## 2025-02-26 NOTE — Clinical Note
Member Name: Lizy Saleem   Member Number: [id number]   Reference Number: [id number]   Approved Services: MRI/CAT Scan   Approved Service Dates: 02/26/2025 - 06/26/2025   Requesting Provider: Laura Her   Requested Provider: [facility]     Dear Lizyhenry HaileKelsy Kylelesli:     The following medical service(s) requested by Laura Her have been approved:    Procedure Code Procedure Code Name Approved Quantity Status   30921 (CPT®) CHG DIAGNOSTIC COMPUTED TOMOGRAPHY THORAX W/CONTRAST 1 Authorized       The services should be provided by [facility] no later than 06/26/2025. Please contact the provider listed below with any questions.     Provider Information:  [facility]  [phone]    Your plan benefit may require a deductible, co-payment or coinsurance for these services. This authorization does not guarantee [facility] will pay the claim for services that you receive. Payment by [facility] for these services is subject to the terms of your Evidence of Coverage or Summary Plan Description, your eligibility at the time of service, and confirmation of benefit coverage.    For any questions or additional information, please contact Customer Service:    [facility]  Customer Service: [phone] or toll free [phone]  [employer] users dial: [phone]   Call Center Hours: Mon - Fri 7 AM to 8 PM PST   Office Hours: Mon - Fri 8 AM to 5 PM [facility]   E-mail: [email or web address]   Website: [email or web address]       This information is available for free in other languages. Please contact Customer Service at the phone number above for more information. [facility] complies with applicable Federal civil rights laws and does not discriminate on the basis of race, color, national origin, age, disability or sex.      Sincerely,     Healthcare Utilization Management Department     Cc: [facility]   Laura Her

## 2025-02-26 NOTE — PROCEDURE: ADDITIONAL NOTES
Detail Level: Simple
Render Risk Assessment In Note?: no
Additional Notes: Patient was given skin services menu to look over cosmetic options for wrinkles.

## 2025-02-26 NOTE — PROCEDURE: LIQUID NITROGEN
Show Aperture Variable?: Yes
Number Of Freeze-Thaw Cycles: 1 freeze-thaw cycle
Duration Of Freeze Thaw-Cycle (Seconds): 4
Application Tool (Optional): Liquid Nitrogen Sprayer
Render Post-Care Instructions In Note?: no
Post-Care Instructions: I reviewed with the patient in detail post-care instructions. Patient is to wear sunprotection, and avoid picking at any of the treated lesions. Pt may apply Vaseline to crusted or scabbing areas.
Consent: The patient's consent was obtained including but not limited to risks of crusting, scabbing, blistering, scarring, darker or lighter pigmentary change, recurrence, incomplete removal and infection.
Detail Level: Detailed

## 2025-02-26 NOTE — Clinical Note
Member Name: Lizy Saleem   Member Number: [id number]   Reference Number: [id number]   Approved Services: Radiology Services   Approved Service Dates: 02/26/2025 - 06/26/2025   Requesting Provider: Fabby Holm   Requested Provider: [facility]     Dear Lizy Kelsy Saleem:     The following medical service(s) requested by Fabby Holm have been approved:    Procedure Code Procedure Code Name Approved Quantity Status   97651 (CPT®) WI BX BREAST 1ST LESION MR IMAG 1 Authorized       The services should be provided by [facility] no later than 06/26/2025. Please contact the provider listed below with any questions.     Provider Information:  [facility]  [phone]    Your plan benefit may require a deductible, co-payment or coinsurance for these services. This authorization does not guarantee [facility] will pay the claim for services that you receive. Payment by [facility] for these services is subject to the terms of your Evidence of Coverage or Summary Plan Description, your eligibility at the time of service, and confirmation of benefit coverage.    For any questions or additional information, please contact Customer Service:    [facility]  Customer Service: [phone] or toll free [phone]  [employer] users dial: [phone]   Call Center Hours: Mon - Fri 7 AM to 8 PM PST   Office Hours: Mon - Fri 8 AM to 5 PM [facility]   E-mail: [email or web address]   Website: [email or web address]       This information is available for free in other languages. Please contact Customer Service at the phone number above for more information. [facility] complies with applicable Federal civil rights laws and does not discriminate on the basis of race, color, national origin, age, disability or sex.      Sincerely,     Healthcare Utilization Management Department     Cc: [facility]   Fabby Holm

## 2025-02-27 ENCOUNTER — RESULTS FOLLOW-UP (OUTPATIENT)
Dept: MEDICAL GROUP | Facility: PHYSICIAN GROUP | Age: 65
End: 2025-02-27

## 2025-02-28 ENCOUNTER — APPOINTMENT (OUTPATIENT)
Dept: RADIOLOGY | Facility: MEDICAL CENTER | Age: 65
End: 2025-02-28
Attending: PHYSICIAN ASSISTANT
Payer: COMMERCIAL

## 2025-02-28 DIAGNOSIS — Z12.31 VISIT FOR SCREENING MAMMOGRAM: ICD-10-CM

## 2025-03-03 ENCOUNTER — RESULTS FOLLOW-UP (OUTPATIENT)
Dept: MEDICAL GROUP | Facility: PHYSICIAN GROUP | Age: 65
End: 2025-03-03

## 2025-03-03 DIAGNOSIS — R92.333 HETEROGENEOUSLY DENSE TISSUE OF BOTH BREASTS ON MAMMOGRAPHY: ICD-10-CM

## 2025-03-05 ENCOUNTER — HOSPITAL ENCOUNTER (OUTPATIENT)
Dept: RADIOLOGY | Facility: MEDICAL CENTER | Age: 65
End: 2025-03-05
Attending: STUDENT IN AN ORGANIZED HEALTH CARE EDUCATION/TRAINING PROGRAM
Payer: COMMERCIAL

## 2025-03-05 DIAGNOSIS — Z80.3 FAMILY HISTORY OF MALIGNANT NEOPLASM OF BREAST: ICD-10-CM

## 2025-03-05 DIAGNOSIS — C34.91 ADENOCARCINOMA OF RIGHT LUNG (HCC): ICD-10-CM

## 2025-03-05 LAB — PATHOLOGY CONSULT NOTE: NORMAL

## 2025-03-05 PROCEDURE — 19085 BX BREAST 1ST LESION MR IMAG: CPT | Mod: RT

## 2025-03-05 PROCEDURE — A9579 GAD-BASE MR CONTRAST NOS,1ML: HCPCS | Mod: JZ | Performed by: STUDENT IN AN ORGANIZED HEALTH CARE EDUCATION/TRAINING PROGRAM

## 2025-03-05 PROCEDURE — 700101 HCHG RX REV CODE 250: Performed by: RADIOLOGY

## 2025-03-05 PROCEDURE — 88305 TISSUE EXAM BY PATHOLOGIST: CPT | Performed by: STUDENT IN AN ORGANIZED HEALTH CARE EDUCATION/TRAINING PROGRAM

## 2025-03-05 PROCEDURE — 88305 TISSUE EXAM BY PATHOLOGIST: CPT | Mod: 26 | Performed by: STUDENT IN AN ORGANIZED HEALTH CARE EDUCATION/TRAINING PROGRAM

## 2025-03-05 PROCEDURE — 700111 HCHG RX REV CODE 636 W/ 250 OVERRIDE (IP): Performed by: RADIOLOGY

## 2025-03-05 PROCEDURE — 700117 HCHG RX CONTRAST REV CODE 255: Mod: JZ | Performed by: STUDENT IN AN ORGANIZED HEALTH CARE EDUCATION/TRAINING PROGRAM

## 2025-03-05 PROCEDURE — 99999 PR NO CHARGE: CPT | Performed by: STUDENT IN AN ORGANIZED HEALTH CARE EDUCATION/TRAINING PROGRAM

## 2025-03-05 RX ORDER — LIDOCAINE HYDROCHLORIDE AND EPINEPHRINE 10; 10 MG/ML; UG/ML
20 INJECTION, SOLUTION INFILTRATION; PERINEURAL ONCE
Status: COMPLETED | OUTPATIENT
Start: 2025-03-05 | End: 2025-03-05

## 2025-03-05 RX ADMIN — LIDOCAINE HYDROCHLORIDE 1 ML: 10 INJECTION, SOLUTION INFILTRATION; PERINEURAL at 13:00

## 2025-03-05 RX ADMIN — GADOTERIDOL 15 ML: 279.3 INJECTION, SOLUTION INTRAVENOUS at 14:16

## 2025-03-05 RX ADMIN — LIDOCAINE HYDROCHLORIDE AND EPINEPHRINE 10 ML: 10; 10 INJECTION, SOLUTION INFILTRATION; PERINEURAL at 13:01

## 2025-03-06 ENCOUNTER — TELEPHONE (OUTPATIENT)
Dept: RADIOLOGY | Facility: MEDICAL CENTER | Age: 65
End: 2025-03-06
Payer: COMMERCIAL

## 2025-03-07 ENCOUNTER — TELEPHONE (OUTPATIENT)
Dept: HEMATOLOGY ONCOLOGY | Facility: MEDICAL CENTER | Age: 65
End: 2025-03-07
Payer: COMMERCIAL

## 2025-03-07 NOTE — TELEPHONE ENCOUNTER
I called Ms. Juliaromulo regarding her R breast specimen post MRI guided biopsy.    We discussed the results were benign.    We also discussed moving her appointment with me.    Fabby Holm MD

## 2025-03-10 ENCOUNTER — HOSPITAL ENCOUNTER (OUTPATIENT)
Dept: RADIOLOGY | Facility: MEDICAL CENTER | Age: 65
End: 2025-03-10
Payer: COMMERCIAL

## 2025-03-10 DIAGNOSIS — D49.89 THYMOMA: ICD-10-CM

## 2025-03-10 DIAGNOSIS — R06.02 SOB (SHORTNESS OF BREATH) ON EXERTION: ICD-10-CM

## 2025-03-10 DIAGNOSIS — C34.91 ADENOCARCINOMA OF RIGHT LUNG (HCC): ICD-10-CM

## 2025-03-10 PROCEDURE — 700117 HCHG RX CONTRAST REV CODE 255

## 2025-03-10 PROCEDURE — 71260 CT THORAX DX C+: CPT

## 2025-03-10 RX ADMIN — IOHEXOL 75 ML: 350 INJECTION, SOLUTION INTRAVENOUS at 11:45

## 2025-03-12 DIAGNOSIS — I71.9 AORTIC ANEURYSM WITHOUT RUPTURE, UNSPECIFIED PORTION OF AORTA (HCC): ICD-10-CM

## 2025-03-14 ENCOUNTER — HOSPITAL ENCOUNTER (OUTPATIENT)
Dept: HEMATOLOGY ONCOLOGY | Facility: MEDICAL CENTER | Age: 65
End: 2025-03-14
Attending: STUDENT IN AN ORGANIZED HEALTH CARE EDUCATION/TRAINING PROGRAM
Payer: COMMERCIAL

## 2025-03-14 DIAGNOSIS — C34.91 ADENOCARCINOMA OF RIGHT LUNG (HCC): ICD-10-CM

## 2025-03-14 DIAGNOSIS — Z82.49 FAMILY HISTORY OF ABDOMINAL AORTIC ANEURYSM: ICD-10-CM

## 2025-03-14 DIAGNOSIS — D49.89 THYMOMA: ICD-10-CM

## 2025-03-14 PROCEDURE — 99214 OFFICE O/P EST MOD 30 MIN: CPT | Mod: 95 | Performed by: STUDENT IN AN ORGANIZED HEALTH CARE EDUCATION/TRAINING PROGRAM

## 2025-03-14 ASSESSMENT — ENCOUNTER SYMPTOMS
COUGH: 0
FEVER: 0
SHORTNESS OF BREATH: 0
BLURRED VISION: 0
HEADACHES: 0
BRUISES/BLEEDS EASILY: 0
TINGLING: 0
ABDOMINAL PAIN: 0
DEPRESSION: 0
SORE THROAT: 0
TREMORS: 0
VOMITING: 0
HEARTBURN: 0
NAUSEA: 0
ORTHOPNEA: 0
FOCAL WEAKNESS: 0
WEIGHT LOSS: 0
CHILLS: 0
PALPITATIONS: 0
SENSORY CHANGE: 0
SPUTUM PRODUCTION: 0
DIZZINESS: 0
NECK PAIN: 0
WHEEZING: 0
MEMORY LOSS: 0

## 2025-03-14 NOTE — PROGRESS NOTES
Consult Note: Hematology/Oncology     Referring Provider: Sanjuana Talavera MD  Primary Care:  Kathy Talavera M.D.    Chief Complaint   Patient presents with    Lung Cancer     Surveillance visit       Current Treatment: None    Prior Treatment: None    This evaluation was conducted via Teams using secure and encrypted videoconferencing technology. The patient was in their home in the [facility].    The patient's identity was confirmed and verbal consent was obtained for this virtual visit.    Subjective:   History of Presenting Illness:  Lizy Saleem is a 64 y.o. 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  She lost her mother to lung cancer  in May 2023.      She and her  got COVID19 2023.  At that time she noted that she lost her appetite.  She subsequently lost her  to COVID19 PNA in 11/2023.  She developed chronic diarrhea and loss of appetite around that time.  She has been losing weight.  This prompted her to see Dr. Lewis (GI) on December 28, 2023 for her diarrhea and loss of appetite who ordered a CT AP.  A mass in her mediastinum was seen by the radiologist.  There is no concern for malignancy in her abdomen and pelvis.  As such a CT Chest was subsequently ordered on December 29 which shows a 6.8 x 9.9 x 13 cm lobulated mass within the left anterior mediastinum.  Additionally there was a 1.2 cm ill-defined opacity in the right upper lobe.    She had a colonoscopy and EGD on 1/11/24.  Only findings was Barretts esophagitis, and she was placed on omeprazole.     She subsequently had a biopsy of the mediastinum on January 24 which confirms thymoma.    Patient's case was presented in tumor board..  It was decided that she would benefit from resection of the thymoma as well as a wedge resection of her right upper lobe lung mass.    Patient underwent thymectomy as well as a right upper lobe lung mass resection.    She  reports that she did very well after the surgery and is surprised how quickly she has recovered.    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     Interval History    Patient reports that she is doing well.  She is here to discuss the results from her most recent CT scan.       Past Medical History:   Diagnosis Date    Acquired hypothyroidism 11/01/2018    Anesthesia     PONV    Anxiety reaction 11/01/2018    Barnard's esophagus     Bowel habit changes     diarrhea    Bronchitis 2004    Lasted a month    Cancer (HCC) 2024    thymoma    Chronic diarrhea 11/18/2020    Chronic midline low back pain without sciatica 11/18/2020    Family history of abdominal aortic aneurysm 02/08/2019    Herpes labialis 02/08/2019    Evans's neuroma of left foot 11/08/2018    PONV (postoperative nausea and vomiting) 2000 and 2003    For bunionectomies    Sleep apnea 2012    Did sleep study with dentist wear dental appliance    Snoring         Past Surgical History:   Procedure Laterality Date    STERNOTMY WITH BILATERAL WEDGE Right 3/11/2024    Procedure: STERNOTOMY, RESECTION THYMOMA, WEDGE RESECTION RIGHT UPPER LOBE;  Surgeon: John H Ganser, M.D.;  Location: SURGERY [facility];  Service: General    HYSTEROSCOPY WITH VIDEO DIAGNOSTIC  10/23/2015    Procedure: HYSTEROSCOPY WITH VIDEO DIAGNOSTIC;  Surgeon: Lesli Frias M.D.;  Location: [facility];  Service:     DILATION AND CURETTAGE  10/23/2015    Procedure: DILATION AND CURETTAGE;  Surgeon: Lesli Frias M.D.;  Location: [facility];  Service:     BUNIONECTOMY Right 01/01/2003    BUNIONECTOMY Left 01/01/2000    ARTHROSCOPY, KNEE Left 01/01/1992    OTHER  1991    Left knee meniscus repair       Social History     Tobacco Use    Smoking status: Never    Smokeless tobacco: Never   Vaping Use    Vaping status: Never Used   Substance Use Topics    Alcohol use: Not Currently    Drug use: No        Family History   Problem Relation Age of Onset     Breast Cancer Paternal Aunt     Cancer Father         Prostate    Hyperlipidemia Father     Hypertension Father     Other Father         multiple aneurisms    Thyroid Mother     Cancer Mother         Melanoma left eye and on leg    Lung Disease Mother     Cancer Cousin     Cancer Maternal Uncle         Stomach cancer    Cancer Paternal Aunt         Masectomy    Cancer Sister         Thyroid cancer       Allergies   Allergen Reactions    Latex Rash     Rash with latex bandages       Current Outpatient Medications   Medication Sig Dispense Refill    levothyroxine (SYNTHROID) 75 MCG Tab TAKE 1 TABLET BY MOUTH EVERY DAY IN THE MORNING ON AN EMPTY STOMACH 90 Tablet 3    NON SPECIFIED 5 mg 2 times a day. C-Testosterone   Apply 2 clicks topically at night      PROGESTERONE PO Place 100 mg under the tongue every day. C-progesterone      valacyclovir (VALTREX) 1 GM Tab TAKE 1 TABLET BY MOUTH EVERY DAY; DOUBLE THE DOSE FOR 7 DAYS AS NEEDED FOR OUTBREAKS 100 Tablet 3    ARMOUR THYROID 60 MG Tab TAKE 1 TABLET BY MOUTH EVERY DAY** NOT COVERED      omeprazole (PRILOSEC) 20 MG delayed-release capsule Take 20 mg by mouth every morning.      B Complex Vitamins (B COMPLEX PO) Take 1 Tablet by mouth every morning.      Ascorbic Acid (VITAMIN C) 1000 MG Tab Take 1,000 mg by mouth 2 times a day.      Omega-3 Fatty Acids (OMEGA 3 PO) Take 1 Tablet by mouth every morning.      Multiple Minerals-Vitamins (CALCIUM CITRATE PLUS/MAGNESIUM PO) Take 1 Tablet by mouth every morning.      Probiotic Product (PRO-BIOTIC BLEND PO) Take 1 Tablet by mouth every morning.      NON SPECIFIED C-E2/E3 25 mg/mlTake 0.1ml SL daily (Patient taking differently: C-E2/E3 25 mg/mlTake 0.1ml SL daily. Nightly except Sunday.) 5 Each 1     No current facility-administered medications for this encounter.       Review of Systems   Constitutional:  Negative for chills, fever, malaise/fatigue and weight loss.   HENT:  Negative for congestion, ear pain, nosebleeds and  sore throat.    Eyes:  Negative for blurred vision.   Respiratory:  Negative for cough, sputum production, shortness of breath and wheezing.    Cardiovascular:  Negative for chest pain, palpitations, orthopnea and leg swelling.   Gastrointestinal:  Negative for abdominal pain, heartburn, nausea and vomiting.   Genitourinary:  Negative for dysuria, frequency and urgency.   Musculoskeletal:  Negative for neck pain.   Neurological:  Negative for dizziness, tingling, tremors, sensory change, focal weakness and headaches.   Endo/Heme/Allergies:  Does not bruise/bleed easily.   Psychiatric/Behavioral:  Negative for depression, memory loss and suicidal ideas.    All other systems reviewed and are negative.      Problem list, medications, and allergies reviewed by myself today in Epic.     Objective:     There were no vitals filed for this visit.      DESC; KARNOFSKY SCALE WITH ECOG EQUIVALENT: 90, Able to carry on normal activity; minor signs or symptoms of disease (ECOG equivalent 0)    DISTRESS LEVEL: no acute distress    Physical Exam  Not done due to virtual visit    Labs:   Most recent labs reviewed.     CBC CMP TSH reviewed without concerns    Imaging:   Most recent images below have been independently reviewed by me.      CT CAP    1.  6.8 x 9.9 x 13.0 cm lobulated mass within the left anterior mediastinum. Differential considerations include lymphoma, thymoma, teratoma.     2.  1.2 cm ill-defined opacity right upper lobe indeterminate.     3.  No pleural effusions.    Pathology:  FINAL DIAGNOSIS:     A. Lung/mediastinal biopsy:          Core biopsy consistent with a thymoma, favoring AB type.     3/11/24  A. Thymoma:          Thymoma, type AB.   B. Right upper lobe lung mass:          Lung adenocarcinoma with lepidic and acinar features.          A carcinoid tumorlet, 0.48 cm in greatest dimension with 1           mitoses per 2 mm2, no necrosis and Ki-67 of < 3%, is noted           within the lepidic area of the  adenocarcinoma.     Assessment/Plan:      Cancer Staging   Adenocarcinoma, lung (HCC)  Staging form: Lung, AJCC 8th Edition  - Clinical stage from 3/11/2024: Stage IA1 (cT1a, cN0, cM0) - Signed by Fabby Holm M.D. on 3/15/2024    Thymoma  Staging form: Thymus, AJCC 8th Edition  - Clinical stage from 1/30/2024: Stage I (cT1a, cN0, cM0) - Signed by Fabby Holm M.D. on 1/30/2024     Ms. Reeves is a 63-year-old female with a recent diagnosis of thymoma s/p thymectomy and RUL adenocarcinoma s/p wedge resection stage 1A1.    Regarding her Thymoma: Her  thymoma was a type AB with the greatest dimension being almost 13 cm.  There is no lymphovascular invasion and margins were negative.  Thus she had a stage I thymoma.    Regarding her RUL NSCLC:  Right upper lobe mass, which measured 1 cm in greatest dimension with acinar and lipidic histological patterns.  There is also tumorlet that measured 4.8 mm with in this mass.  There is no visceropleural invasion or direct invasion of any structures.  Tumor margins were negative.  Staging was at T1 a P0 equating to a stage Ia 1.    Surveillance for both thymoma and stage Ia 1 non-small cell lung cancer is CT chest with contrast every 6 months.    CT scan reviewed. No concerns for disease recurrence.      Plan   -CT chest in 6 months  Follow-up with me shortly after.    2. Ascending thoracic aoritc aneurysm  -4cm  -referral placed to vascular medicine      Any questions and concerns raised by the patient were addressed and answered. Patient denies any further questions.  Patient encouraged to call the office with any concerns or issues.     Fabby Holm M.D.  Hematology/Oncology    31 minutes was spent on this visit

## 2025-03-20 ENCOUNTER — TELEPHONE (OUTPATIENT)
Dept: HEALTH INFORMATION MANAGEMENT | Facility: OTHER | Age: 65
End: 2025-03-20
Payer: COMMERCIAL

## 2025-03-23 ENCOUNTER — APPOINTMENT (OUTPATIENT)
Dept: RADIOLOGY | Facility: MEDICAL CENTER | Age: 65
End: 2025-03-23
Attending: NURSE PRACTITIONER
Payer: COMMERCIAL

## 2025-03-25 ENCOUNTER — APPOINTMENT (OUTPATIENT)
Dept: URBAN - METROPOLITAN AREA CLINIC 20 | Facility: CLINIC | Age: 65
Setting detail: DERMATOLOGY
End: 2025-03-25

## 2025-03-25 DIAGNOSIS — Z41.9 ENCOUNTER FOR PROCEDURE FOR PURPOSES OTHER THAN REMEDYING HEALTH STATE, UNSPECIFIED: ICD-10-CM

## 2025-03-25 PROCEDURE — ? DIAMONDGLOW

## 2025-03-25 ASSESSMENT — LOCATION ZONE DERM: LOCATION ZONE: FACE

## 2025-03-25 ASSESSMENT — LOCATION SIMPLE DESCRIPTION DERM
LOCATION SIMPLE: CHIN
LOCATION SIMPLE: RIGHT CHEEK
LOCATION SIMPLE: LEFT CHEEK
LOCATION SIMPLE: SUPERIOR FOREHEAD

## 2025-03-25 ASSESSMENT — LOCATION DETAILED DESCRIPTION DERM
LOCATION DETAILED: RIGHT INFERIOR CENTRAL MALAR CHEEK
LOCATION DETAILED: LEFT CENTRAL MALAR CHEEK
LOCATION DETAILED: RIGHT MENTAL CREASE
LOCATION DETAILED: SUPERIOR MID FOREHEAD

## 2025-03-25 NOTE — PROCEDURE: DIAMONDGLOW
Detail Level: Zone
Number Of Passes: 4
Consent: Written consent obtained, risks reviewed including but not limited to crusting, scabbing, blistering, scarring, darker or lighter pigmentary change, bruising, and/or incomplete response.
Price (Use Numbers Only, No Special Characters Or $): 559
Post-Care Instructions: I reviewed with the patient in detail post-care instructions. Patient should stay away from the sun and wear sun protection until treated areas are fully healed.
Facial Treatment Head Used?: Not Used
Solution Used: Hyaluronic acid
Vacuum Pressure In Inches: 10
Intelliflow Setting: 100%

## 2025-03-26 ENCOUNTER — APPOINTMENT (OUTPATIENT)
Dept: RADIOLOGY | Facility: MEDICAL CENTER | Age: 65
End: 2025-03-26
Attending: NURSE PRACTITIONER
Payer: COMMERCIAL

## 2025-03-28 DIAGNOSIS — B00.9 RECURRENT HSV (HERPES SIMPLEX VIRUS): ICD-10-CM

## 2025-03-29 RX ORDER — VALACYCLOVIR HYDROCHLORIDE 1 G/1
TABLET, FILM COATED ORAL
Qty: 90 TABLET | Refills: 4 | Status: SHIPPED | OUTPATIENT
Start: 2025-03-29

## 2025-04-03 ENCOUNTER — APPOINTMENT (OUTPATIENT)
Dept: URBAN - METROPOLITAN AREA CLINIC 20 | Facility: CLINIC | Age: 65
Setting detail: DERMATOLOGY
End: 2025-04-03

## 2025-04-03 DIAGNOSIS — Z41.9 ENCOUNTER FOR PROCEDURE FOR PURPOSES OTHER THAN REMEDYING HEALTH STATE, UNSPECIFIED: ICD-10-CM

## 2025-04-03 PROCEDURE — ? PHOTO-DOCUMENTATION

## 2025-04-03 PROCEDURE — ? COSMETIC FOLLOW-UP

## 2025-04-07 ENCOUNTER — HOSPITAL ENCOUNTER (OUTPATIENT)
Dept: LAB | Facility: MEDICAL CENTER | Age: 65
End: 2025-04-07
Attending: NURSE PRACTITIONER
Payer: COMMERCIAL

## 2025-04-07 ENCOUNTER — HOSPITAL ENCOUNTER (OUTPATIENT)
Dept: LAB | Facility: MEDICAL CENTER | Age: 65
End: 2025-04-07
Attending: OBSTETRICS & GYNECOLOGY
Payer: COMMERCIAL

## 2025-04-07 LAB
25(OH)D3 SERPL-MCNC: 64 NG/ML (ref 30–100)
ALBUMIN SERPL BCP-MCNC: 4 G/DL (ref 3.2–4.9)
ALBUMIN/GLOB SERPL: 1.6 G/DL
ALP SERPL-CCNC: 60 U/L (ref 30–99)
ALT SERPL-CCNC: 12 U/L (ref 2–50)
ANION GAP SERPL CALC-SCNC: 9 MMOL/L (ref 7–16)
AST SERPL-CCNC: 16 U/L (ref 12–45)
BASOPHILS # BLD AUTO: 0.5 % (ref 0–1.8)
BASOPHILS # BLD: 0.03 K/UL (ref 0–0.12)
BILIRUB SERPL-MCNC: 0.4 MG/DL (ref 0.1–1.5)
BUN SERPL-MCNC: 20 MG/DL (ref 8–22)
CALCIUM ALBUM COR SERPL-MCNC: 8.9 MG/DL (ref 8.5–10.5)
CALCIUM SERPL-MCNC: 8.9 MG/DL (ref 8.5–10.5)
CHLORIDE SERPL-SCNC: 107 MMOL/L (ref 96–112)
CHOLEST SERPL-MCNC: 138 MG/DL (ref 100–199)
CO2 SERPL-SCNC: 24 MMOL/L (ref 20–33)
CORTIS SERPL-MCNC: 23.3 UG/DL (ref 0–23)
CREAT SERPL-MCNC: 0.79 MG/DL (ref 0.5–1.4)
CRP SERPL HS-MCNC: 1.9 MG/L (ref 0–3)
DHEA-S SERPL-MCNC: 37.9 UG/DL (ref 18.9–205)
EOSINOPHIL # BLD AUTO: 0.1 K/UL (ref 0–0.51)
EOSINOPHIL NFR BLD: 1.8 % (ref 0–6.9)
ERYTHROCYTE [DISTWIDTH] IN BLOOD BY AUTOMATED COUNT: 49.4 FL (ref 35.9–50)
EST. AVERAGE GLUCOSE BLD GHB EST-MCNC: 120 MG/DL
ESTRADIOL SERPL-MCNC: 42.9 PG/ML
FERRITIN SERPL-MCNC: 15.7 NG/ML (ref 10–291)
FOLATE SERPL-MCNC: 35.6 NG/ML
FSH SERPL-ACNC: 36.7 MIU/ML
GFR SERPLBLD CREATININE-BSD FMLA CKD-EPI: 84 ML/MIN/1.73 M 2
GGT SERPL-CCNC: 17 U/L (ref 7–34)
GLOBULIN SER CALC-MCNC: 2.5 G/DL (ref 1.9–3.5)
GLUCOSE SERPL-MCNC: 97 MG/DL (ref 65–99)
HBA1C MFR BLD: 5.8 % (ref 4–5.6)
HCT VFR BLD AUTO: 41.3 % (ref 37–47)
HCYS SERPL-SCNC: 6.68 UMOL/L
HDLC SERPL-MCNC: 69 MG/DL
HGB BLD-MCNC: 13.7 G/DL (ref 12–16)
IMM GRANULOCYTES # BLD AUTO: 0.01 K/UL (ref 0–0.11)
IMM GRANULOCYTES NFR BLD AUTO: 0.2 % (ref 0–0.9)
LDLC SERPL CALC-MCNC: 61 MG/DL
LH SERPL-ACNC: 24.2 IU/L
LYMPHOCYTES # BLD AUTO: 1.47 K/UL (ref 1–4.8)
LYMPHOCYTES NFR BLD: 26.9 % (ref 22–41)
MCH RBC QN AUTO: 31.6 PG (ref 27–33)
MCHC RBC AUTO-ENTMCNC: 33.2 G/DL (ref 32.2–35.5)
MCV RBC AUTO: 95.4 FL (ref 81.4–97.8)
MONOCYTES # BLD AUTO: 0.4 K/UL (ref 0–0.85)
MONOCYTES NFR BLD AUTO: 7.3 % (ref 0–13.4)
NEUTROPHILS # BLD AUTO: 3.45 K/UL (ref 1.82–7.42)
NEUTROPHILS NFR BLD: 63.3 % (ref 44–72)
NRBC # BLD AUTO: 0 K/UL
NRBC BLD-RTO: 0 /100 WBC (ref 0–0.2)
PLATELET # BLD AUTO: 197 K/UL (ref 164–446)
PMV BLD AUTO: 12.9 FL (ref 9–12.9)
POTASSIUM SERPL-SCNC: 4.2 MMOL/L (ref 3.6–5.5)
PROGEST SERPL-MCNC: 10.7 NG/ML
PROT SERPL-MCNC: 6.5 G/DL (ref 6–8.2)
RBC # BLD AUTO: 4.33 M/UL (ref 4.2–5.4)
SODIUM SERPL-SCNC: 140 MMOL/L (ref 135–145)
T3 SERPL-MCNC: 82.1 NG/DL (ref 60–181)
T3FREE SERPL-MCNC: 2.44 PG/ML (ref 2–4.4)
T4 FREE SERPL-MCNC: 1.06 NG/DL (ref 0.93–1.7)
T4 SERPL-MCNC: 7.1 UG/DL (ref 4–12)
THYROPEROXIDASE AB SERPL-ACNC: <9 IU/ML (ref 0–9)
TRIGL SERPL-MCNC: 38 MG/DL (ref 0–149)
TSH SERPL-ACNC: 0.98 UIU/ML (ref 0.38–5.33)
VIT B12 SERPL-MCNC: 1373 PG/ML (ref 211–911)
WBC # BLD AUTO: 5.5 K/UL (ref 4.8–10.8)

## 2025-04-07 PROCEDURE — 82642 DIHYDROTESTOSTERONE: CPT

## 2025-04-07 PROCEDURE — 84403 ASSAY OF TOTAL TESTOSTERONE: CPT

## 2025-04-07 PROCEDURE — 83036 HEMOGLOBIN GLYCOSYLATED A1C: CPT

## 2025-04-07 PROCEDURE — 86141 C-REACTIVE PROTEIN HS: CPT

## 2025-04-07 PROCEDURE — 80053 COMPREHEN METABOLIC PANEL: CPT

## 2025-04-07 PROCEDURE — 36415 COLL VENOUS BLD VENIPUNCTURE: CPT

## 2025-04-07 PROCEDURE — 84144 ASSAY OF PROGESTERONE: CPT

## 2025-04-07 PROCEDURE — 85025 COMPLETE CBC W/AUTO DIFF WBC: CPT

## 2025-04-07 PROCEDURE — 84481 FREE ASSAY (FT-3): CPT

## 2025-04-07 PROCEDURE — 82670 ASSAY OF TOTAL ESTRADIOL: CPT

## 2025-04-07 PROCEDURE — 84439 ASSAY OF FREE THYROXINE: CPT

## 2025-04-07 PROCEDURE — 82746 ASSAY OF FOLIC ACID SERUM: CPT

## 2025-04-07 PROCEDURE — 84270 ASSAY OF SEX HORMONE GLOBUL: CPT

## 2025-04-07 PROCEDURE — 82306 VITAMIN D 25 HYDROXY: CPT

## 2025-04-07 PROCEDURE — 82542 COL CHROMOTOGRAPHY QUAL/QUAN: CPT

## 2025-04-07 PROCEDURE — 80061 LIPID PANEL: CPT

## 2025-04-07 PROCEDURE — 86800 THYROGLOBULIN ANTIBODY: CPT

## 2025-04-07 PROCEDURE — 83002 ASSAY OF GONADOTROPIN (LH): CPT

## 2025-04-07 PROCEDURE — 83090 ASSAY OF HOMOCYSTEINE: CPT

## 2025-04-07 PROCEDURE — 82607 VITAMIN B-12: CPT

## 2025-04-07 PROCEDURE — 82627 DEHYDROEPIANDROSTERONE: CPT

## 2025-04-07 PROCEDURE — 82533 TOTAL CORTISOL: CPT

## 2025-04-07 PROCEDURE — 82728 ASSAY OF FERRITIN: CPT

## 2025-04-07 PROCEDURE — 84480 ASSAY TRIIODOTHYRONINE (T3): CPT

## 2025-04-07 PROCEDURE — 84443 ASSAY THYROID STIM HORMONE: CPT

## 2025-04-07 PROCEDURE — 84140 ASSAY OF PREGNENOLONE: CPT

## 2025-04-07 PROCEDURE — 83001 ASSAY OF GONADOTROPIN (FSH): CPT

## 2025-04-07 PROCEDURE — 84479 ASSAY OF THYROID (T3 OR T4): CPT

## 2025-04-07 PROCEDURE — 84402 ASSAY OF FREE TESTOSTERONE: CPT

## 2025-04-07 PROCEDURE — 86376 MICROSOMAL ANTIBODY EACH: CPT

## 2025-04-07 PROCEDURE — 84482 T3 REVERSE: CPT

## 2025-04-07 PROCEDURE — 83525 ASSAY OF INSULIN: CPT

## 2025-04-07 PROCEDURE — 82977 ASSAY OF GGT: CPT

## 2025-04-08 LAB — T UPTAKE NL11712: 1.1 TBI (ref 0.8–1.3)

## 2025-04-09 LAB
INSULIN P FAST SERPL-ACNC: 4 UIU/ML (ref 3–25)
THYROGLOB AB SERPL-ACNC: <1.5 IU/ML (ref 0–4)

## 2025-04-11 LAB
ANDROSTANOLONE SERPL-MCNC: 434.4 PG/ML (ref 24–208)
PREG SERPL-MCNC: 110 NG/DL (ref 15–132)
SHBG SERPL-SCNC: 70 NMOL/L (ref 17–125)
TESTOST FREE SERPL-MCNC: 14.6 PG/ML (ref 0.6–3.8)
TESTOST SERPL-MCNC: 138 NG/DL (ref 5–32)

## 2025-04-14 LAB — T3REVERSE SERPL-MCNC: 13.1 NG/DL (ref 9–27)

## 2025-04-15 LAB — MISCELLANEOUS LAB RESULT MISCLAB: NORMAL

## 2025-04-20 ENCOUNTER — HOSPITAL ENCOUNTER (OUTPATIENT)
Dept: RADIOLOGY | Facility: MEDICAL CENTER | Age: 65
End: 2025-04-20
Attending: NURSE PRACTITIONER
Payer: COMMERCIAL

## 2025-04-20 DIAGNOSIS — K20.90 BARRETT'S ESOPHAGUS WITH ESOPHAGITIS: ICD-10-CM

## 2025-04-20 DIAGNOSIS — R19.4 FREQUENT BOWEL MOVEMENTS: ICD-10-CM

## 2025-04-20 DIAGNOSIS — K22.70 BARRETT'S ESOPHAGUS WITH ESOPHAGITIS: ICD-10-CM

## 2025-04-20 DIAGNOSIS — K52.9 INFLAMMATORY BOWEL DISEASE: ICD-10-CM

## 2025-04-20 DIAGNOSIS — R63.0 ANOREXIA: ICD-10-CM

## 2025-04-20 PROCEDURE — 76705 ECHO EXAM OF ABDOMEN: CPT

## 2025-05-07 ENCOUNTER — OFFICE VISIT (OUTPATIENT)
Dept: VASCULAR LAB | Facility: MEDICAL CENTER | Age: 65
End: 2025-05-07
Attending: FAMILY MEDICINE
Payer: COMMERCIAL

## 2025-05-07 VITALS
DIASTOLIC BLOOD PRESSURE: 72 MMHG | HEIGHT: 66 IN | HEART RATE: 73 BPM | SYSTOLIC BLOOD PRESSURE: 112 MMHG | WEIGHT: 158 LBS | BODY MASS INDEX: 25.39 KG/M2

## 2025-05-07 DIAGNOSIS — E03.9 ACQUIRED HYPOTHYROIDISM: ICD-10-CM

## 2025-05-07 DIAGNOSIS — I77.810 ASCENDING AORTA DILATION (HCC): ICD-10-CM

## 2025-05-07 DIAGNOSIS — Z85.118 HISTORY OF LUNG CANCER: ICD-10-CM

## 2025-05-07 DIAGNOSIS — Z87.898 HISTORY OF THYMOMA: ICD-10-CM

## 2025-05-07 DIAGNOSIS — E78.41 ELEVATED LIPOPROTEIN(A): ICD-10-CM

## 2025-05-07 DIAGNOSIS — Z82.49 FAMILY HISTORY OF AORTIC ANEURYSM: ICD-10-CM

## 2025-05-07 PROCEDURE — 3074F SYST BP LT 130 MM HG: CPT | Performed by: FAMILY MEDICINE

## 2025-05-07 PROCEDURE — 99212 OFFICE O/P EST SF 10 MIN: CPT

## 2025-05-07 PROCEDURE — 99204 OFFICE O/P NEW MOD 45 MIN: CPT | Performed by: FAMILY MEDICINE

## 2025-05-07 PROCEDURE — 3078F DIAST BP <80 MM HG: CPT | Performed by: FAMILY MEDICINE

## 2025-05-07 ASSESSMENT — ENCOUNTER SYMPTOMS
HEMOPTYSIS: 0
SHORTNESS OF BREATH: 0
COUGH: 0
PALPITATIONS: 0
CHILLS: 0
WHEEZING: 0
CLAUDICATION: 0
PND: 0
ORTHOPNEA: 0
SPUTUM PRODUCTION: 0
FEVER: 0

## 2025-05-07 ASSESSMENT — FIBROSIS 4 INDEX: FIB4 SCORE: 1.48

## 2025-05-07 NOTE — PROGRESS NOTES
INITIAL VASCULAR MEDICINE CLINIC VISIT  05/07/25     Lizy Saleem is a 64 y.o. female referred for eval/med mgmt of ascending aortic dilation/aneurysm (AsAA), est 5/2025  Referring provider: Fabby Holm MD (heme/onc)    Subjective      AsAA: Denies current chest, back, abdominal pain, SOB, dysphagia, worsening cough, hemoptysis.    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  Incidentally found ascending Ao dilation on recent interval CT surveillance .  3.7 cm, initial noted size on CT chest with  at age 62 (2023)  HTN: No  Hx of tobacco:   reports that she has never smoked. She has never used smokeless tobacco.  Hx of connective tissue d/o (eg. Marfans, Cynthia-Danlos, Loeys-Jake): no  Hx of inflammatory / autoimmune vasculitis (Takayasu's arteritis, Giant Cell arteritis): no   Hx of infective aortitis, HIV, syphilis: no   Hx of MVA, chest wall trauma, deceleration injuries: no   Hx of Biscuspid Aortic Valve:  no, per echo   Hx of anabolic steroid use or legal or illicit stimulants: no  Pertinent famhx:   Connective tissue disorders (MFS, LDS, vEDS): no   Aneurysmal disease or known hereditary thoracic aneurysmal disease (HTAD): yes - father with AAA, MGM with ruptured ascending AA - see fhx   Peripheral / intracranial aneurysm(s): no  Unexplained sudden death at relatively young age: no     HTN: no prior dx or meds     HLD: no prior dx or meds     DYSGLYCEMIA: no    ANTITHROMBOTIC TX: none  - no hx of bleeding      Patient Active Problem List    Diagnosis Date Noted    Ascending aorta dilation (HCC) 05/07/2025    Elevated lipoprotein(a) 05/07/2025    Heterogeneously dense tissue of both breasts on mammography 03/03/2025    Barnard's esophagus without dysplasia 04/15/2024    Sleep apnea, unspecified 04/15/2024    Cavernoma 04/15/2024    Adenocarcinoma, lung (HCC) 03/15/2024    Thymoma 01/30/2024    Primary insomnia  01/26/2024    Diarrhea 09/30/2023    Thickened endometrium 09/30/2023    Chronic pain of both knees 06/19/2022    Recurrent HSV (herpes simplex virus) 06/19/2022    Dense breasts 01/21/2022    Hormone replacement therapy (HRT) 01/21/2022    First degree hemorrhoids 12/02/2020    Chronic midline low back pain without sciatica 11/18/2020    Family history of aortic aneurysm 02/08/2019    Evans's neuroma of left foot 11/08/2018    DERREK (generalized anxiety disorder) 11/01/2018    Acquired hypothyroidism 11/01/2018    Menopausal syndrome (hot flashes) 11/01/2018    Restless legs 11/01/2018    Family history of malignant neoplasm of breast 11/17/2017      Past Surgical History:   Procedure Laterality Date    STERNOTMY WITH BILATERAL WEDGE Right 3/11/2024    Procedure: STERNOTOMY, RESECTION THYMOMA, WEDGE RESECTION RIGHT UPPER LOBE;  Surgeon: John H Ganser, M.D.;  Location: SURGERY [facility];  Service: General    HYSTEROSCOPY WITH VIDEO DIAGNOSTIC  10/23/2015    Procedure: HYSTEROSCOPY WITH VIDEO DIAGNOSTIC;  Surgeon: Lesli Frias M.D.;  Location: SURGERY [facility];  Service:     DILATION AND CURETTAGE  10/23/2015    Procedure: DILATION AND CURETTAGE;  Surgeon: Lesli Frisa M.D.;  Location: [facility];  Service:     BUNIONECTOMY Right 01/01/2003    BUNIONECTOMY Left 01/01/2000    ARTHROSCOPY, KNEE Left 01/01/1992    OTHER  1991    Left knee meniscus repair      Family History   Problem Relation Age of Onset    Thyroid Mother     Cancer Mother         Melanoma left eye and on leg    Lung Disease Mother     Cancer Father         Prostate    Hyperlipidemia Father     Hypertension Father     Other Father         multiple aneurisms    Abdominal aortic aneurysm Father     Arterial Aneurysm Father         pop art aneurysm    Cancer Sister         Thyroid cancer    Cancer Maternal Uncle         Stomach cancer    Breast Cancer Paternal Aunt     Cancer Paternal Aunt         Masectomy    Arterial  Aneurysm Paternal Grandmother         rupture?    Cancer Cousin       Current Outpatient Medications on File Prior to Visit   Medication Sig Dispense Refill    valacyclovir (VALTREX) 1 GM Tab TAKE 1 TABLET BY MOUTH EVERY DAY DOUBLE THE DOSE FOR 7 DAYS AS NEEDED FOR OUTBREAKS 90 Tablet 4    levothyroxine (SYNTHROID) 75 MCG Tab TAKE 1 TABLET BY MOUTH EVERY DAY IN THE MORNING ON AN EMPTY STOMACH 90 Tablet 3    NON SPECIFIED 5 mg 2 times a day. C-Testosterone   Apply 2 clicks topically at night      PROGESTERONE PO Place 100 mg under the tongue every day. C-progesterone      ARMOUR THYROID 60 MG Tab TAKE 1 TABLET BY MOUTH EVERY DAY** NOT COVERED      omeprazole (PRILOSEC) 20 MG delayed-release capsule Take 20 mg by mouth every morning.      B Complex Vitamins (B COMPLEX PO) Take 1 Tablet by mouth every morning.      Ascorbic Acid (VITAMIN C) 1000 MG Tab Take 1,000 mg by mouth 2 times a day.      Omega-3 Fatty Acids (OMEGA 3 PO) Take 1 Tablet by mouth every morning.      Multiple Minerals-Vitamins (CALCIUM CITRATE PLUS/MAGNESIUM PO) Take 1 Tablet by mouth every morning.      Probiotic Product (PRO-BIOTIC BLEND PO) Take 1 Tablet by mouth every morning.      NON SPECIFIED C-E2/E3 25 mg/mlTake 0.1ml SL daily (Patient taking differently: C-E2/E3 25 mg/mlTake 0.1ml SL daily. Nightly except Sunday.) 5 Each 1     No current facility-administered medications on file prior to visit.      Allergies   Allergen Reactions    Latex Rash     Rash with latex bandages        Social History     Tobacco Use    Smoking status: Never    Smokeless tobacco: Never   Vaping Use    Vaping status: Never Used   Substance Use Topics    Alcohol use: Not Currently    Drug use: No     DIET AND EXERCISE:  Weight Change:stable   Diet: heart healthy choices, Med style   Exercise: moderate regular exercise program     Review of Systems   Constitutional:  Negative for chills, fever and malaise/fatigue.   Respiratory:  Negative for cough, hemoptysis, sputum  "production, shortness of breath and wheezing.    Cardiovascular:  Negative for chest pain, palpitations, orthopnea, claudication, leg swelling and PND.          Objective    Vitals:    05/07/25 1003   BP: 112/72   BP Location: Left arm   Patient Position: Sitting   BP Cuff Size: Adult   Pulse: 73   Weight: 71.7 kg (158 lb)   Height: 1.676 m (5' 6\")      BP Readings from Last 4 Encounters:   05/07/25 112/72   01/22/25 102/60   09/20/24 124/70   04/15/24 122/66      Body mass index is 25.5 kg/m².   Wt Readings from Last 4 Encounters:   05/07/25 71.7 kg (158 lb)   01/22/25 71.7 kg (158 lb)   09/20/24 74.5 kg (164 lb 3.9 oz)   06/05/24 71.7 kg (158 lb)      Physical Exam  Constitutional:       General: She is not in acute distress.     Appearance: Normal appearance. She is not diaphoretic.   HENT:      Head: Normocephalic and atraumatic.   Eyes:      Conjunctiva/sclera: Conjunctivae normal.   Cardiovascular:      Rate and Rhythm: Normal rate and regular rhythm.      Pulses:           Carotid pulses are 2+ on the right side and 2+ on the left side.       Radial pulses are 2+ on the right side and 2+ on the left side.        Dorsalis pedis pulses are 2+ on the right side and 2+ on the left side.        Posterior tibial pulses are 2+ on the right side and 2+ on the left side.      Heart sounds: Normal heart sounds.   Pulmonary:      Effort: Pulmonary effort is normal.      Breath sounds: Normal breath sounds.   Musculoskeletal:      Right lower leg: No edema.      Left lower leg: No edema.   Skin:     General: Skin is warm and dry.   Neurological:      Mental Status: She is alert and oriented to person, place, and time.      Cranial Nerves: No cranial nerve deficit.      Gait: Gait is intact.   Psychiatric:         Mood and Affect: Mood and affect normal.          DATA REVIEW    Lab Results   Component Value Date/Time    CHOLSTRLTOT 138 04/07/2025 06:31 AM    LDL 61 04/07/2025 06:31 AM    HDL 69 04/07/2025 06:31 AM    " "TRIGLYCERIDE 38 2025 06:31 AM       Lab Results   Component Value Date/Time    LDL 61 2025 06:31 AM    LDL 90 2024 06:43 AM    LDL 70 09/15/2023 10:43 AM    LDL 68 2022 09:27 AM    LDL 52 2021 09:21 AM       Lab Results   Component Value Date/Time    LIPOPROTA 34 (H) 2024 06:43 AM      Lab Results   Component Value Date/Time    APOB 84 2024 06:43 AM      Lab Results   Component Value Date/Time    CRPHIGHSEN 1.9 2025 06:31 AM       Lab Results   Component Value Date/Time    SODIUM 140 2025 06:31 AM    POTASSIUM 4.2 2025 06:31 AM    CHLORIDE 107 2025 06:31 AM    CO2 24 2025 06:31 AM    GLUCOSE 97 2025 06:31 AM    BUN 20 2025 06:31 AM    CREATININE 0.79 2025 06:31 AM     Lab Results   Component Value Date/Time    ALKPHOSPHAT 60 2025 06:31 AM    ASTSGOT 16 2025 06:31 AM    ALTSGPT 12 2025 06:31 AM    TBILIRUBIN 0.4 2025 06:31 AM       Lab Results   Component Value Date/Time    HBA1C 5.8 (H) 2025 06:31 AM       No results found for: \"MICROALBCALC\", \"MALBCRT\", \"MALBEXCR\", \"ITZHYM78\", \"MICROALBUR\", \"MICRALB\", \"UMICROALBUM\", \"MICROALBTIM\"      IMAGIN/2023 CT chest with   Vascular: The aorta is opacified and patent..  The ascending aorta is ectatic measuring 3.7 x 3.7 cm.    3/2024 LILIAN  Aortic Valve  Structurally normal aortic valve without significant stenosis or regurgitation.  Normal aortic root for body surface area.    2024 CT chest with   Cardiac: Heart normal in size without pericardial effusion.   Vascular: Unremarkable.    2025 CT panc/abd   Arterial vasculature: The celiac axis has a normal branching pattern. The SMA is patent.   Venous vasculature: The portal vein, superior mesenteric vein, and splenic vein are patent.   Vascular encasement: None.   Kidneys: Kidneys are unremarkable.  Adrenals: Adrenals are unremarkable.    3/10/25 CT chest with   1. No evidence of disease " recurrence.  2. No acute processes.  3. 4 cm ascending thoracic aortic aneurysm.        Pathology:  FINAL DIAGNOSIS:     A. Lung/mediastinal biopsy:          Core biopsy consistent with a thymoma, favoring AB type.      3/11/24  A. Thymoma:          Thymoma, type AB.   B. Right upper lobe lung mass:          Lung adenocarcinoma with lepidic and acinar features.          A carcinoid tumorlet, 0.48 cm in greatest dimension with 1           mitoses per 2 mm2, no necrosis and Ki-67 of < 3%, is noted           within the lepidic area of the adenocarcinoma.       PROCEDURES: none         Medical Decision Making:  Today's Assessment / Status / Plan:     1. Ascending aorta dilation (HCC)        2. Family history of aortic aneurysm        3. History of lung cancer        4. History of thymoma        5. Acquired hypothyroidism        6. Elevated lipoprotein(a)           Established CVD:     1) ASCENDING AORTIC DILATION/ANEURYSM - stable, asymptomatic  - mild dilation incidentally found age 64 during CA surveillance  - majority sporadic, presumed multifactorial etiology including age-related medial degeneration, no hx of HTN,   - in general, atherosclerosis can be contributor but not primary etiology   - BAV - negative on echo biscupid aortic valve (BAV)  Familial TAD risk factors review (2022 ACC/AHA guidelines, up to 20-30% cases likely genetic-mediated):    TAD <60: age 62    TAD and syndromic features (MFS, LDS, vEDS): no   Fhx of TAD or peripheral / intracranial aneurysm (1st, 2nd degree relatives): YES   Fhx of unexplained sudden death at young age (1st, 2nd degree relatives): no   12/2023 CT chest with = 3.7cm - INITIAL sizing   3/2025 CT chest with = 4.0cm - mild growth from prior imaging (0.15cm/yr)  Plan:   - at initial visit - reviewed anatomy, risks, emergency s/s requiring immediate attention, and thresholds for surgical intervention and gave handout   Lifestyle mgmt:  - continue healthy lifestyle choices as noted  below and avoid all tobacco products   - activity restrictions to reduce risk of aneurysmal expansion include avoidance of the following:  long duration high intensity cardio, extreme endurance activities, high-intensity strength training >20-30lbs, and avoid straining or holding breath when lifting  Med mgmt:   - focus BB and ARB as 1st line HTN agents  - treatment of dyslipidemia, dysglycemia   - avoid all prescription or illicit stimulants   Screening/surveillance:  - recommend 1st degree relatives get screened with echo for TAA (per 2022 ACC guideline)  - check [facility] genetic aortopathy evaluation based upon Familial TAD risk factors above  - recommended for lifelong surveillance - initial echo in 6mo from prior to eval for any rapid growth (9/2025).    - if stable initial surveillance imaging then repeat annual echo surveillance, and consider CTA q3-5yr or if indications of rapid expansion noted  - may be getting interval CT surveillance for Lung CA, so can use that for surveillance as well  - indications for CT surg evaluation include size >5.5cm or rapid expansion (sporadic or nsHTAD TAAs (>0.5+ cm/yr or >0.3 cm/yr in 2 consecutive years)     Causes of TAA (2022 ACC/AHA guidelines) - possible etiologies in bold italics  Degenerative conditions    Age-associated medial degeneration   Hypertension  Atherosclerosis   Tobacco use   HTAD syndromic  Marfan syndrome  Loeys-Jake syndrome  Vascular Cynthia-Danlos syndrome  Smooth muscle dysfunction syndrome  Others: attributable to pathogenic variants in FLNA, BGN, LOX  HTAD nonsyndromic  ACTA2, MYH11, PRKG1, MYLK, and others  Familial thoracic aortic aneurysm without identified pathogenic variants in a known gene for HTAD  Congenital conditions  Bicuspid aortic valve  Quiroga syndrome  Coarctation of the aorta  Complex congenital heart defects (tetralogy of Fallot, transposition of the great vessels, truncus arteriosus)  Previous aortic dissection  Inflammatory  "aortitis  Giant cell arteritis  Takayasu arteritis  Behcet's disease  Immunoglobulin G4-related disease, antineutrophil cytoplasmic antibody-related, sarcoidosis  Infectious aortitis  Bacterial, fungal, syphilitic  Previous traumatic aortic injury     LIPID MANAGEMENT  Qualifies for Statin Therapy per ACC/AHA Guidelines: no  The 10-year ASCVD risk score (Vic LEON, et al., 2019) is: 2.5%, low risk   Major ASCVD events: None    High-risk conditions: n/a  Risk-enhancers: n/a  Lipoprotein(a): n/a  Currently on Statin: No  Tx goals: LDL-C <100, consider <130 reasonable   At goal? N/a  Plan:  monitor annual labs through PCP or our office, continue lifestyle mgmt, no current meds indicated       BLOOD PRESSURE MANAGEMENT  Office BP Goal per ACC/AHA<130/80, resting HR <60  Home BP at goal:  yes  Office BP at goal:  yes  Plan:   - monitor annual office-based BP and/or intermittent at home BP, report >130/80    - continue healthy lifestyle  Medications: none      GLYCEMIC MANAGEMENT Prediabetic  Lab Results   Component Value Date/Time    HBA1C 5.8 (H) 04/07/2025 06:31 AM    HBA1C 5.9 (H) 11/20/2024 06:36 AM    HBA1C 5.8 (H) 06/18/2024 07:15 AM       No results found for: \"MICROALBCALC\", \"MALBCRT\", \"MALBEXCR\", \"EFMFUQ61\", \"MICROALBUR\", \"MICRALB\", \"UMICROALBUM\", \"MICROALBTIM\"    Plan:  - continue healthy diet, weight reduction, daily physical activity   - consider metformin initiation up to 850mg BID if A1c 6.2+ in future to reduce T2D progression  - monitor labs Q6-12mo     ANTITHROMBOTIC THERAPY: Not currently recommended    LIFESTYLE INTERVENTIONS  TOBACCO:    reports that she has never smoked. She has never used smokeless tobacco.   - continued complete avoidance of all tobacco products     NUTRITION: Mediterranean and reduce Na to <2,300mg/day      ETOH: to limit to occasional social use    WT MGMT: maintain healthy weight     PHYSICAL ACTIVITY: as noted above   As per 2022 ACC/AHA guideline for aortic disease regarding " exercise:   - In patients with aortic disease, limited data are available to guide recommendations regarding the forms of exercise that are safe and promote cardiovascular health versus those that pose an acute or  long-term risk of aortic growth or rupture. But evidence exists regarding the physiologic benefits of exercise and the hemodynamic consequences of various form of exercise and exertion in case series and relevant animal models.   - consensus among numerous expert committees that it is wise to avoid intense isometric exertion or exercises that require the Valsalva maneuver, given that heavy lifting with Valsalva can produce acute increases in SBP to >300 mm Hg.   - consensus that light weightlifting and low-intensity aerobic exercise are safe and likely improve both physical and mental health.   - No uniform consensus exists about the safety of intermediate-level static and aerobic exercise.   - Recommendations for exercise intensity are best individualized informed by multiple factors that include underlying aortic pathology, aortic diameter and ASI, aortic growth rate, age, family history, and any other high-risk features (eg, uncontrolled hypertension).  - Ongoing investigation is needed to better define the levels of resistance activities that would be considered low-risk for adverse aortic events, favoring greater exercise restrictions among patients at higher risk of dissection     OTHER:     # thymoma, s/p resection - defer mgmt to heme/onc team     # lung adenocarcinoma - defer mgmt to heme/onc team   - higher risk for VTE events, so advised to monitor for new onset painful leg  swelling, CP, JIANG    STUDIES:  9/2025 CT chest (ordered per heme/onc)  - RN to track   FOLLOW-UP: 4 months     Thiago Vazquez M.D.   [facility]  [facility]  [phone]    Cc:

## 2025-05-12 ENCOUNTER — HOSPITAL ENCOUNTER (OUTPATIENT)
Dept: RADIOLOGY | Facility: MEDICAL CENTER | Age: 65
End: 2025-05-12
Attending: NURSE PRACTITIONER
Payer: COMMERCIAL

## 2025-05-12 DIAGNOSIS — Z80.0 FAMILY HISTORY OF MALIGNANT NEOPLASM OF GASTROINTESTINAL TRACT: ICD-10-CM

## 2025-05-12 DIAGNOSIS — K22.89 ESOPHAGEAL HEMORRHAGE: ICD-10-CM

## 2025-05-12 DIAGNOSIS — K58.0 IRRITABLE BOWEL SYNDROME WITH DIARRHEA: ICD-10-CM

## 2025-05-13 NOTE — PROGRESS NOTES
Assumed care of patient at 0645. Bedside report received. Assessment complete.  AA&Ox4. Denies CP/SOB.  Reporting 0/10 pain. Declined intervention at this time.  Educated patient regarding pharmacologic and non pharmacologic modalities for pain management.  Midline chest incision, silver island dressing in place.  Tolerating full liquid diet. Denies N/V.  + void. Last BM PTA  Pt ambulates standby FWW. Sternal precautions in place.  All needs met at this time. Call light within reach. Pt calls appropriately. Bed low and locked, non skid socks in place. Hourly rounding in place.     Post-Care Instructions: I reviewed with the patient in detail post-care instructions. Patient is to wear sunprotection, and avoid picking at any of the treated lesions. Pt may apply Vaseline to crusted or scabbing areas. Number Of Freeze-Thaw Cycles: 3 freeze-thaw cycles Render Post-Care Instructions In Note?: yes Render Note In Bullet Format When Appropriate: No Duration Of Freeze Thaw-Cycle (Seconds): 3 Aperture Size (Optional): C Detail Level: Simple Consent: The patient's consent was obtained including but not limited to risks of crusting, scabbing, blistering, scarring, darker or lighter pigmentary change, recurrence, incomplete removal and infection.

## 2025-05-18 ENCOUNTER — HOSPITAL ENCOUNTER (OUTPATIENT)
Dept: RADIOLOGY | Facility: MEDICAL CENTER | Age: 65
End: 2025-05-18
Attending: PHYSICIAN ASSISTANT
Payer: COMMERCIAL

## 2025-05-18 ENCOUNTER — OFFICE VISIT (OUTPATIENT)
Dept: URGENT CARE | Facility: PHYSICIAN GROUP | Age: 65
End: 2025-05-18
Payer: COMMERCIAL

## 2025-05-18 VITALS
RESPIRATION RATE: 14 BRPM | SYSTOLIC BLOOD PRESSURE: 130 MMHG | TEMPERATURE: 97 F | HEIGHT: 66 IN | DIASTOLIC BLOOD PRESSURE: 78 MMHG | BODY MASS INDEX: 24.91 KG/M2 | WEIGHT: 155 LBS | OXYGEN SATURATION: 97 % | HEART RATE: 59 BPM

## 2025-05-18 DIAGNOSIS — M25.562 ACUTE PAIN OF LEFT KNEE: Primary | ICD-10-CM

## 2025-05-18 DIAGNOSIS — M25.562 ACUTE PAIN OF LEFT KNEE: ICD-10-CM

## 2025-05-18 DIAGNOSIS — M17.12 ARTHRITIS OF KNEE, LEFT: ICD-10-CM

## 2025-05-18 PROCEDURE — 99214 OFFICE O/P EST MOD 30 MIN: CPT | Performed by: PHYSICIAN ASSISTANT

## 2025-05-18 PROCEDURE — 73564 X-RAY EXAM KNEE 4 OR MORE: CPT | Mod: LT

## 2025-05-18 PROCEDURE — 3075F SYST BP GE 130 - 139MM HG: CPT | Performed by: PHYSICIAN ASSISTANT

## 2025-05-18 PROCEDURE — 3078F DIAST BP <80 MM HG: CPT | Performed by: PHYSICIAN ASSISTANT

## 2025-05-18 RX ORDER — MELOXICAM 15 MG/1
15 TABLET ORAL DAILY
Qty: 30 TABLET | Refills: 0 | Status: SHIPPED | OUTPATIENT
Start: 2025-05-18

## 2025-05-18 ASSESSMENT — ENCOUNTER SYMPTOMS
CHILLS: 0
MYALGIAS: 1
FALLS: 0
TINGLING: 0
FEVER: 0
WEAKNESS: 0

## 2025-05-18 ASSESSMENT — FIBROSIS 4 INDEX: FIB4 SCORE: 1.48

## 2025-05-18 NOTE — PROGRESS NOTES
Subjective:     CHIEF COMPLAINT  Chief Complaint   Patient presents with    Knee Pain     Left knee, popping, trouble walking downhill, no known injury, pain  X 5 days       HPI  Lizy Saleem is a very pleasant 64 y.o. female who presents to the clinic with left knee pain x 5 days.  Denies any recent injury or trauma.  States she tore her ACL when she was 13 years old in her left knee and never had this repaired.  She has also had a meniscal repair preformed approximately 30 years ago.  Currently experiencing pain over the anterior aspect of the left knee.  Notes occasional popping when she bends and straightens the knee.  Pain is most apparent when she is walking downhill.  Has not noted swelling or discoloration.  Has been taking ibuprofen as needed.    REVIEW OF SYSTEMS  Review of Systems   Constitutional:  Negative for chills, fever and malaise/fatigue.   Musculoskeletal:  Positive for joint pain and myalgias. Negative for falls.   Skin:  Negative for rash.   Neurological:  Negative for tingling and weakness.       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    Primary insomnia 01/26/2024    Diarrhea 09/30/2023    Thickened endometrium 09/30/2023    Chronic pain of both knees 06/19/2022    Recurrent HSV (herpes simplex virus) 06/19/2022    Dense breasts 01/21/2022    Hormone replacement therapy (HRT) 01/21/2022    First degree hemorrhoids 12/02/2020    Chronic midline low back pain without sciatica 11/18/2020    Family history of aortic aneurysm 02/08/2019    Evans's neuroma of left foot 11/08/2018    DERREK (generalized anxiety disorder)  11/01/2018    Acquired hypothyroidism 11/01/2018    Menopausal syndrome (hot flashes) 11/01/2018    Restless legs 11/01/2018    Family history of malignant neoplasm of breast 11/17/2017       SURGICAL HISTORY   has a past surgical history that includes bunionectomy (Left, 01/01/2000); bunionectomy (Right, 01/01/2003); arthroscopy, knee (Left, 01/01/1992); hysteroscopy with video diagnostic (10/23/2015); dilation and curettage (10/23/2015); other (1991); and sternotmy with bilateral wedge (Right, 3/11/2024).    ALLERGIES  Allergies[1]    CURRENT MEDICATIONS  Home Medications       Reviewed by Ricky López P.A.-C. (Physician Assistant) on 05/18/25 at 1045  Med List Status: <None>     Medication Last Dose Status   ARMOUR THYROID 60 MG Tab Taking Active   Ascorbic Acid (VITAMIN C) 1000 MG Tab Taking Active   B Complex Vitamins (B COMPLEX PO) Taking Active   levothyroxine (SYNTHROID) 75 MCG Tab Taking Active   Multiple Minerals-Vitamins (CALCIUM CITRATE PLUS/MAGNESIUM PO) Taking Active   NON SPECIFIED Taking Active   NON SPECIFIED Taking Active   Omega-3 Fatty Acids (OMEGA 3 PO) Taking Active   omeprazole (PRILOSEC) 20 MG delayed-release capsule Taking Active   Probiotic Product (PRO-BIOTIC BLEND PO) Taking Active   PROGESTERONE PO Taking Active   valacyclovir (VALTREX) 1 GM Tab Taking Active                    SOCIAL HISTORY  Social History     Tobacco Use    Smoking status: Never    Smokeless tobacco: Never   Vaping Use    Vaping status: Never Used   Substance and Sexual Activity    Alcohol use: Not Currently    Drug use: No    Sexual activity: Not Currently     Partners: Male     Birth control/protection: Post-Menopausal     Comment: , retired from [employer]       FAMILY HISTORY  Family History   Problem Relation Age of Onset    Thyroid Mother     Cancer Mother         Melanoma left eye and on leg    Lung Disease Mother     Cancer Father         Prostate    Hyperlipidemia Father     Hypertension  "Father     Other Father         multiple aneurisms    Abdominal aortic aneurysm Father     Arterial Aneurysm Father         pop art aneurysm    Cancer Sister         Thyroid cancer    Cancer Maternal Uncle         Stomach cancer    Breast Cancer Paternal Aunt     Cancer Paternal Aunt         Masectomy    Arterial Aneurysm Paternal Grandmother         rupture?    Cancer Cousin           Objective:     VITAL SIGNS: /78   Pulse (!) 59   Temp 36.1 °C (97 °F) (Temporal)   Resp 14   Ht 1.676 m (5' 6\")   Wt 70.3 kg (155 lb)   SpO2 97%   BMI 25.02 kg/m²     PHYSICAL EXAM  Physical Exam  Constitutional:       General: She is not in acute distress.     Appearance: Normal appearance. She is not ill-appearing, toxic-appearing or diaphoretic.   HENT:      Head: Normocephalic and atraumatic.   Eyes:      Conjunctiva/sclera: Conjunctivae normal.   Pulmonary:      Effort: Pulmonary effort is normal.   Musculoskeletal:      Cervical back: Normal range of motion.      Comments: Left knee: No obvious deformity, discoloration or effusion present.  Patient maintains full knee flexion and extension.  Normal patellar tracking.  No tenderness over the medial or lateral joint line.  No tenderness over the patella.   Neurological:      Mental Status: She is alert.     RADIOLOGY RESULTS   DX-KNEE COMPLETE 4+ LEFT  Result Date: 5/18/2025 5/18/2025 11:03 AM HISTORY/REASON FOR EXAM:  Pain/Deformity Following Trauma. TECHNIQUE/EXAM DESCRIPTION AND NUMBER OF VIEWS:  4 views of the LEFT knee. COMPARISON: None FINDINGS: There is mild tricompartmental osteoarthrosis. There is no definite fracture, dislocation or significant joint effusion.     No radiographic evidence of acute traumatic injury.    NM-BILIARY (HIDA) SCAN WITH CCK  Result Date: 5/12/2025 5/12/2025 9:30 AM HISTORY/REASON FOR EXAM:  Abdominal pain TECHNIQUE/EXAM DESCRIPTION AND NUMBER OF VIEWS: 5.62 mCi Tc 99-Choletec was administered intravenously, followed by 60 minutes " of anterior planar imaging. 1.433 micrograms CCK was then infused over 30 minutes, and anterior planar imaging was performed.  A gallbladder ejection fraction was then calculated. COMPARISON: Ultrasound 4/20/2025 FINDINGS: There is prompt uptake of tracer in the hepatic parenchyma.  The gallbladder fills with activity within 60 minutes and activity is excreted normally into the small bowel. The gallbladder ejection fraction is 58%.     Normal hepatobiliary scan. No evidence of acute cholecystitis.  Normal gallbladder ejection fraction. Normal gallbladder ejection fraction is 38% or greater.    US-RUQ  Result Date: 4/21/2025 4/20/2025 9:03 AM HISTORY/REASON FOR EXAM:  Frequent bowel movements, esophagitis, inflammatory bowel disease, anorexia TECHNIQUE/EXAM DESCRIPTION AND NUMBER OF VIEWS:  Real-time sonography of the liver and biliary tree. COMPARISON: None FINDINGS: LIVER: There are a few small echogenic foci within the liver, largest measuring 1.7 cm. Consistent with hemangiomas. The liver measures 15.41 cm. The portal vein is patent with hepatopetal flow. The MPV measures 0.66 cm. GALLBLADDER: No gallstone or gallbladder wall thickening. Negative sonographic Tinoco's sign.. Gallbladder wall thickness measures 1.90 mm. COMMON BILE DUCT: Measures 5.00 mm. PANCREAS: Visualized portion of the pancreas appears normal. Overall, pancreas not well-seen secondary to overlying bowel gas. RIGHT KIDNEY: No renal stone or hydronephrosis. Normal cortical echogenicity. The right kidney measures 10.15 cm. IVC:  Visualized portion of IVC is normal. OTHER: There is no ascites.     1.  No acute process seen. 2.  There are a few small liver hemangiomas, largest 1.7 cm.         Assessment/Plan:     1. Acute pain of left knee  - DX-KNEE COMPLETE 4+ LEFT; Future      MDM/Comments:    Very pleasant and well-appearing 64-year-old female presents to the clinic with left knee pain x 5 days.  Denies any preceding injury or trauma.  Prior  history significant for ACL tear 13 years old without repair as well as meniscectomy approximately 30 years ago.  Has been experiencing pain, popping, clicking and mild stiffness over the last few days.  Pain worsened with walking downhill.  No bony tenderness.  Full range of motion on exam.  X-rays performed in clinic demonstrates tricompartment arthritis.  Will trial a course of meloxicam and continued stretching and strengthening exercises.  Referral placed to follow-up with orthopedics if symptoms fail to improve with conservative measures.    Differential diagnosis, natural history, supportive care, and indications for immediate follow-up discussed. All questions answered. Patient agrees with the plan of care.    Follow-up as needed if symptoms worsen or fail to improve to PCP, Urgent care or Emergency Room.    I have personally reviewed prior external notes and test results pertinent to today's visit.  I have independently reviewed and interpreted all diagnostics ordered during this urgent care acute visit.   Discussed management options (risks,benefits, and alternatives to treatment). Pt expresses understanding and the treatment plan was agreed upon. Questions were encouraged and answered to pt's satisfaction.    Please note that this dictation was created using voice recognition software. I have made a reasonable attempt to correct obvious errors, but I expect that there are errors of grammar and possibly content that I did not discover before finalizing the note.       [1]   Allergies  Allergen Reactions    Latex Rash     Rash with latex bandages

## 2025-06-03 ENCOUNTER — APPOINTMENT (OUTPATIENT)
Dept: URBAN - METROPOLITAN AREA CLINIC 20 | Facility: CLINIC | Age: 65
Setting detail: DERMATOLOGY
End: 2025-06-03

## 2025-06-03 DIAGNOSIS — Z41.9 ENCOUNTER FOR PROCEDURE FOR PURPOSES OTHER THAN REMEDYING HEALTH STATE, UNSPECIFIED: ICD-10-CM

## 2025-06-03 PROCEDURE — ? DIAMONDGLOW

## 2025-06-03 PROCEDURE — ? DERMAPLANE

## 2025-06-03 ASSESSMENT — LOCATION SIMPLE DESCRIPTION DERM
LOCATION SIMPLE: LEFT LIP
LOCATION SIMPLE: RIGHT FOREHEAD
LOCATION SIMPLE: RIGHT CHEEK
LOCATION SIMPLE: LEFT CHEEK
LOCATION SIMPLE: RIGHT LIP
LOCATION SIMPLE: LEFT FOREHEAD

## 2025-06-03 ASSESSMENT — LOCATION ZONE DERM
LOCATION ZONE: LIP
LOCATION ZONE: FACE

## 2025-06-03 ASSESSMENT — LOCATION DETAILED DESCRIPTION DERM
LOCATION DETAILED: LEFT INFERIOR CENTRAL MALAR CHEEK
LOCATION DETAILED: RIGHT LOWER CUTANEOUS LIP
LOCATION DETAILED: RIGHT LATERAL MALAR CHEEK
LOCATION DETAILED: LEFT LOWER CUTANEOUS LIP
LOCATION DETAILED: RIGHT MEDIAL FOREHEAD
LOCATION DETAILED: LEFT FOREHEAD
LOCATION DETAILED: LEFT CENTRAL MALAR CHEEK
LOCATION DETAILED: RIGHT INFERIOR CENTRAL MALAR CHEEK

## 2025-06-06 ENCOUNTER — HOSPITAL ENCOUNTER (OUTPATIENT)
Dept: LAB | Facility: MEDICAL CENTER | Age: 65
End: 2025-06-06
Attending: INTERNAL MEDICINE
Payer: COMMERCIAL

## 2025-06-06 LAB
25(OH)D3 SERPL-MCNC: 74 NG/ML (ref 30–100)
ALBUMIN SERPL BCP-MCNC: 4 G/DL (ref 3.2–4.9)
ALBUMIN/GLOB SERPL: 1.6 G/DL
ALP SERPL-CCNC: 62 U/L (ref 30–99)
ALT SERPL-CCNC: 14 U/L (ref 2–50)
ANION GAP SERPL CALC-SCNC: 10 MMOL/L (ref 7–16)
AST SERPL-CCNC: 20 U/L (ref 12–45)
BASOPHILS # BLD AUTO: 0.8 % (ref 0–1.8)
BASOPHILS # BLD: 0.04 K/UL (ref 0–0.12)
BILIRUB SERPL-MCNC: 0.4 MG/DL (ref 0.1–1.5)
BUN SERPL-MCNC: 22 MG/DL (ref 8–22)
CALCIUM ALBUM COR SERPL-MCNC: 8.8 MG/DL (ref 8.5–10.5)
CALCIUM SERPL-MCNC: 8.8 MG/DL (ref 8.5–10.5)
CHLORIDE SERPL-SCNC: 106 MMOL/L (ref 96–112)
CO2 SERPL-SCNC: 23 MMOL/L (ref 20–33)
CREAT SERPL-MCNC: 0.84 MG/DL (ref 0.5–1.4)
DHEA-S SERPL-MCNC: 74.5 UG/DL (ref 18.9–205)
EOSINOPHIL # BLD AUTO: 0.12 K/UL (ref 0–0.51)
EOSINOPHIL NFR BLD: 2.3 % (ref 0–6.9)
ERYTHROCYTE [DISTWIDTH] IN BLOOD BY AUTOMATED COUNT: 44.2 FL (ref 35.9–50)
ESTRADIOL SERPL-MCNC: 36.7 PG/ML
FSH SERPL-ACNC: 37 MIU/ML
GFR SERPLBLD CREATININE-BSD FMLA CKD-EPI: 78 ML/MIN/1.73 M 2
GLOBULIN SER CALC-MCNC: 2.5 G/DL (ref 1.9–3.5)
GLUCOSE SERPL-MCNC: 91 MG/DL (ref 65–99)
HCT VFR BLD AUTO: 41.3 % (ref 37–47)
HGB BLD-MCNC: 13.6 G/DL (ref 12–16)
IMM GRANULOCYTES # BLD AUTO: 0.01 K/UL (ref 0–0.11)
IMM GRANULOCYTES NFR BLD AUTO: 0.2 % (ref 0–0.9)
LH SERPL-ACNC: 19.8 IU/L
LYMPHOCYTES # BLD AUTO: 1.47 K/UL (ref 1–4.8)
LYMPHOCYTES NFR BLD: 28.5 % (ref 22–41)
MCH RBC QN AUTO: 31.1 PG (ref 27–33)
MCHC RBC AUTO-ENTMCNC: 32.9 G/DL (ref 32.2–35.5)
MCV RBC AUTO: 94.5 FL (ref 81.4–97.8)
MONOCYTES # BLD AUTO: 0.39 K/UL (ref 0–0.85)
MONOCYTES NFR BLD AUTO: 7.6 % (ref 0–13.4)
NEUTROPHILS # BLD AUTO: 3.13 K/UL (ref 1.82–7.42)
NEUTROPHILS NFR BLD: 60.6 % (ref 44–72)
NRBC # BLD AUTO: 0 K/UL
NRBC BLD-RTO: 0 /100 WBC (ref 0–0.2)
PLATELET # BLD AUTO: 206 K/UL (ref 164–446)
PMV BLD AUTO: 13.3 FL (ref 9–12.9)
POTASSIUM SERPL-SCNC: 4.2 MMOL/L (ref 3.6–5.5)
PROT SERPL-MCNC: 6.5 G/DL (ref 6–8.2)
RBC # BLD AUTO: 4.37 M/UL (ref 4.2–5.4)
SODIUM SERPL-SCNC: 139 MMOL/L (ref 135–145)
T3FREE SERPL-MCNC: 2.32 PG/ML (ref 2–4.4)
T4 FREE SERPL-MCNC: 0.95 NG/DL (ref 0.93–1.7)
THYROPEROXIDASE AB SERPL-ACNC: 9.3 IU/ML (ref 0–9)
TSH SERPL-ACNC: 2.18 UIU/ML (ref 0.38–5.33)
VIT B12 SERPL-MCNC: 1476 PG/ML (ref 211–911)
WBC # BLD AUTO: 5.2 K/UL (ref 4.8–10.8)

## 2025-06-06 PROCEDURE — 36415 COLL VENOUS BLD VENIPUNCTURE: CPT

## 2025-06-06 PROCEDURE — 82607 VITAMIN B-12: CPT

## 2025-06-06 PROCEDURE — 84403 ASSAY OF TOTAL TESTOSTERONE: CPT

## 2025-06-06 PROCEDURE — 82626 DEHYDROEPIANDROSTERONE: CPT

## 2025-06-06 PROCEDURE — 84443 ASSAY THYROID STIM HORMONE: CPT

## 2025-06-06 PROCEDURE — 82306 VITAMIN D 25 HYDROXY: CPT

## 2025-06-06 PROCEDURE — 82627 DEHYDROEPIANDROSTERONE: CPT

## 2025-06-06 PROCEDURE — 84481 FREE ASSAY (FT-3): CPT

## 2025-06-06 PROCEDURE — 83002 ASSAY OF GONADOTROPIN (LH): CPT

## 2025-06-06 PROCEDURE — 84439 ASSAY OF FREE THYROXINE: CPT

## 2025-06-06 PROCEDURE — 82157 ASSAY OF ANDROSTENEDIONE: CPT

## 2025-06-06 PROCEDURE — 84270 ASSAY OF SEX HORMONE GLOBUL: CPT

## 2025-06-06 PROCEDURE — 86376 MICROSOMAL ANTIBODY EACH: CPT

## 2025-06-06 PROCEDURE — 83001 ASSAY OF GONADOTROPIN (FSH): CPT

## 2025-06-06 PROCEDURE — 85025 COMPLETE CBC W/AUTO DIFF WBC: CPT

## 2025-06-06 PROCEDURE — 80053 COMPREHEN METABOLIC PANEL: CPT

## 2025-06-06 PROCEDURE — 82670 ASSAY OF TOTAL ESTRADIOL: CPT

## 2025-06-09 ENCOUNTER — APPOINTMENT (OUTPATIENT)
Dept: URBAN - METROPOLITAN AREA CLINIC 20 | Facility: CLINIC | Age: 65
Setting detail: DERMATOLOGY
End: 2025-06-09

## 2025-06-09 DIAGNOSIS — Z41.9 ENCOUNTER FOR PROCEDURE FOR PURPOSES OTHER THAN REMEDYING HEALTH STATE, UNSPECIFIED: ICD-10-CM

## 2025-06-09 PROCEDURE — ? JUVEDERM VOLBELLA INJECTION

## 2025-06-09 NOTE — PROCEDURE: JUVEDERM VOLBELLA INJECTION
Jawline Filler Volume In Cc: 0
Additional Area 4 Location: upper cutaneous lip
Post-Care Instructions: Patient instructed to apply ice to reduce swelling.
Anesthesia Type: 1% lidocaine with epinephrine
Lot #: [id number]
Filler: Juvederm Volbella XC
Additional Area 2 Location: earlobe lines
Additional Anesthesia Volume In Cc: 6
Use Map Statement For Sites (Optional): No
Lateral Face Filler Volume In Cc: 0.5
Map Statment: See Attach Map for Complete Details
Additional Area 1 Location: chin
Additional Area 3 Location: jawline
Procedural Text: The filler was administered to the treatment areas noted above.
Additional Area 5 Location: forehead
Expiration Date (Month Year): 09/13/2026
Number Of Syringes (Required For Inventory): 1
Consent: Written consent obtained. Risks include but not limited to bruising, beading, irregular texture, ulceration, infection, allergic reaction, scar formation, incomplete augmentation, temporary nature, procedural pain.
Detail Level: Detailed

## 2025-06-11 LAB — SHBG SERPL-SCNC: 86 NMOL/L (ref 17–125)

## 2025-06-15 LAB
ANDROST SERPL-MCNC: 0.32 NG/ML (ref 0.13–0.82)
DHEA SERPL-MCNC: 0.68 NG/ML (ref 0.63–4.7)
TESTOST SERPL-MCNC: 42 NG/DL (ref 5–32)

## 2025-06-26 RX ORDER — LEVOTHYROXINE SODIUM 75 UG/1
75 TABLET ORAL
Qty: 90 TABLET | Refills: 0 | Status: SHIPPED | OUTPATIENT
Start: 2025-06-26

## 2025-07-29 ENCOUNTER — APPOINTMENT (OUTPATIENT)
Dept: URBAN - METROPOLITAN AREA CLINIC 20 | Facility: CLINIC | Age: 65
Setting detail: DERMATOLOGY
End: 2025-07-29

## 2025-07-29 DIAGNOSIS — Z41.9 ENCOUNTER FOR PROCEDURE FOR PURPOSES OTHER THAN REMEDYING HEALTH STATE, UNSPECIFIED: ICD-10-CM

## 2025-07-29 PROCEDURE — ? DIAMONDGLOW

## 2025-07-29 PROCEDURE — ? DERMAPLANE

## 2025-07-29 ASSESSMENT — LOCATION SIMPLE DESCRIPTION DERM
LOCATION SIMPLE: LEFT CHEEK
LOCATION SIMPLE: FRONTAL SCALP
LOCATION SIMPLE: RIGHT LIP
LOCATION SIMPLE: RIGHT CHEEK
LOCATION SIMPLE: LEFT FOREHEAD

## 2025-07-29 ASSESSMENT — LOCATION DETAILED DESCRIPTION DERM
LOCATION DETAILED: RIGHT CENTRAL BUCCAL CHEEK
LOCATION DETAILED: LEFT CENTRAL MALAR CHEEK
LOCATION DETAILED: LEFT FOREHEAD
LOCATION DETAILED: MEDIAL FRONTAL SCALP
LOCATION DETAILED: RIGHT INFERIOR LATERAL MALAR CHEEK
LOCATION DETAILED: RIGHT CENTRAL MALAR CHEEK
LOCATION DETAILED: RIGHT LOWER CUTANEOUS LIP
LOCATION DETAILED: LEFT INFERIOR CENTRAL MALAR CHEEK

## 2025-07-29 ASSESSMENT — LOCATION ZONE DERM
LOCATION ZONE: FACE
LOCATION ZONE: LIP
LOCATION ZONE: SCALP

## 2025-08-20 ENCOUNTER — APPOINTMENT (OUTPATIENT)
Dept: URBAN - METROPOLITAN AREA CLINIC 4 | Facility: CLINIC | Age: 65
Setting detail: DERMATOLOGY
End: 2025-08-20

## 2025-08-20 DIAGNOSIS — D485 NEOPLASM OF UNCERTAIN BEHAVIOR OF SKIN: ICD-10-CM

## 2025-08-20 DIAGNOSIS — D22 MELANOCYTIC NEVI: ICD-10-CM

## 2025-08-20 DIAGNOSIS — D18.0 HEMANGIOMA: ICD-10-CM

## 2025-08-20 DIAGNOSIS — Z71.89 OTHER SPECIFIED COUNSELING: ICD-10-CM

## 2025-08-20 DIAGNOSIS — L57.0 ACTINIC KERATOSIS: ICD-10-CM

## 2025-08-20 DIAGNOSIS — L82.1 OTHER SEBORRHEIC KERATOSIS: ICD-10-CM

## 2025-08-20 DIAGNOSIS — L81.4 OTHER MELANIN HYPERPIGMENTATION: ICD-10-CM

## 2025-08-20 PROBLEM — D18.01 HEMANGIOMA OF SKIN AND SUBCUTANEOUS TISSUE: Status: ACTIVE | Noted: 2025-08-20

## 2025-08-20 PROBLEM — D48.5 NEOPLASM OF UNCERTAIN BEHAVIOR OF SKIN: Status: ACTIVE | Noted: 2025-08-20

## 2025-08-20 PROBLEM — D22.5 MELANOCYTIC NEVI OF TRUNK: Status: ACTIVE | Noted: 2025-08-20

## 2025-08-20 PROCEDURE — ? SUNSCREEN RECOMMENDATIONS

## 2025-08-20 PROCEDURE — ? BIOPSY BY SHAVE METHOD

## 2025-08-20 PROCEDURE — ? COUNSELING

## 2025-08-20 PROCEDURE — ? LIQUID NITROGEN

## 2025-08-20 ASSESSMENT — LOCATION DETAILED DESCRIPTION DERM
LOCATION DETAILED: RIGHT ELBOW
LOCATION DETAILED: LEFT CLAVICULAR SKIN
LOCATION DETAILED: LEFT INFERIOR LATERAL LOWER BACK
LOCATION DETAILED: RIGHT POSTERIOR SHOULDER
LOCATION DETAILED: LEFT POSTERIOR SHOULDER
LOCATION DETAILED: INFERIOR THORACIC SPINE

## 2025-08-20 ASSESSMENT — LOCATION ZONE DERM
LOCATION ZONE: TRUNK
LOCATION ZONE: ARM

## 2025-08-20 ASSESSMENT — LOCATION SIMPLE DESCRIPTION DERM
LOCATION SIMPLE: RIGHT ELBOW
LOCATION SIMPLE: RIGHT SHOULDER
LOCATION SIMPLE: LEFT CLAVICULAR SKIN
LOCATION SIMPLE: UPPER BACK
LOCATION SIMPLE: LEFT SHOULDER
LOCATION SIMPLE: LEFT LOWER BACK

## 2025-09-12 ENCOUNTER — APPOINTMENT (OUTPATIENT)
Dept: LAB | Facility: MEDICAL CENTER | Age: 65
End: 2025-09-12
Payer: COMMERCIAL

## (undated) DEVICE — WIRE STEEL 5-0 B&S 20 OHS - 5/PK 12PK/BX ITEM. D5329 OR D6625 CAN BE USED AS A SUB

## (undated) DEVICE — SUTURE GENERAL

## (undated) DEVICE — BOVIE  BLADE 6 EXTENDED - (50/PK)

## (undated) DEVICE — TUBE NG SALEM SUMP 16FR (50EA/CA)

## (undated) DEVICE — CLIP SM INTNL HRZN TI ESCP LGT - (24EA/PK 25PK/BX)

## (undated) DEVICE — PACK MAJOR BASIN - (2EA/CA)

## (undated) DEVICE — LACTATED RINGERS INJ 1000 ML - (14EA/CA 60CA/PF)

## (undated) DEVICE — TRANSDUCER ADULT DISP. SINGLE BONDED STERILE - (20EA/CA)

## (undated) DEVICE — SUTURE 4-0 PROLENE SH 36 (36PK/BX)"

## (undated) DEVICE — ELECTRODE DUAL RETURN W/ CORD - (50/PK)

## (undated) DEVICE — DRAPE IOBAN II INCISE 23X17 - (10EA/BX 4BX/CA)

## (undated) DEVICE — CLIP MED LG INTNL HRZN TI ESCP - (20/BX)

## (undated) DEVICE — TRAY MULTI-LUMEN 7FR PRESSURE W/MAX BARRIER AND BIOPATCH - (5/CA)

## (undated) DEVICE — STAPLER SKIN DISP - (6/BX 10BX/CA) VISISTAT

## (undated) DEVICE — SPONGE KITTNER DISSECTORS - (5EA/PK 50PK/CA)

## (undated) DEVICE — GOWN WARMING STANDARD FLEX - (30/CA)

## (undated) DEVICE — Device

## (undated) DEVICE — CANISTER SUCTION 3000ML MECHANICAL FILTER AUTO SHUTOFF MEDI-VAC NONSTERILE LF DISP  (40EA/CA)

## (undated) DEVICE — SUCTION INSTRUMENT YANKAUER BULBOUS TIP W/O VENT (50EA/CA)

## (undated) DEVICE — SET BIFURCATED BLOOD - (48EA/CS)

## (undated) DEVICE — TUBING CLEARLINK DUO-VENT - C-FLO (48EA/CA)

## (undated) DEVICE — DRESSING POST OP BORDER 4 X 10 (5EA/BX)

## (undated) DEVICE — SUTURE 4-0 VICRYL PLUS FS-1 - 27 INCH (36/BX)

## (undated) DEVICE — BLADE SURGICAL #11 - (50/BX)

## (undated) DEVICE — SHEET CARDIOVASCULAR - (4/CA)

## (undated) DEVICE — GLOVE BIOGEL M SZ 8 SURGICAL PF LTX - (50/BX 4BX/CA)

## (undated) DEVICE — CONNECTOR HUBLESS DRAINAGE - ONE WAY (20/BX)

## (undated) DEVICE — CHLORAPREP 26 ML APPLICATOR - ORANGE TINT(25/CA)

## (undated) DEVICE — STAPLER ECHELON 3000 45MM STANDARD (3EA/BX)

## (undated) DEVICE — SET FLUID WARMING STANDARD FLOW - (10/CA)

## (undated) DEVICE — SET EXTENSION WITH 2 PORTS (48EA/CA) ***PART #2C8610 IS A SUBSTITUTE*****

## (undated) DEVICE — CLIP LG INTNL HRZN TI ESCP LGT - (20/BX)

## (undated) DEVICE — STAPLE 45MM BLUE 3.5MM ECHELON (12EA/BX)

## (undated) DEVICE — SUTURE 7 SURGICAL STEEL CCS CUTTING (12/BX)

## (undated) DEVICE — KIT RADIAL ARTERY 20GA W/MAX BARRIER AND BIOPATCH  (5EA/CA) #10740 IS FOR THE SET RADIAL ARTERIAL

## (undated) DEVICE — SLEEVE VASO CALF MED - (10PR/CA)

## (undated) DEVICE — SUTURE 4-0 VICRYL PLUS FS-2 - 27 INCH (36/BX)

## (undated) DEVICE — SYSTEM CHEST DRAIN ADULT/PEDS W/AUTO TRANSFUSION CAPABILITY SAHARA (6EA/CA)

## (undated) DEVICE — SUTURE 0 SILK CT-1 (36PK/BX)

## (undated) DEVICE — COVER LIGHT HANDLE ALC PLUS DISP (18EA/BX)

## (undated) DEVICE — BLADE STERNUM SAW SURGICAL 32.0 X 6.4 MM STERILE (1/EA)

## (undated) DEVICE — SODIUM CHL IRRIGATION 0.9% 1000ML (12EA/CA)

## (undated) DEVICE — SET LEADWIRE 5 LEAD BEDSIDE DISPOSABLE ECG (1SET OF 5/EA)

## (undated) DEVICE — SUTURE 1 VICRYL PLUSCT-1 27IN - (36/BX)

## (undated) DEVICE — SENSOR OXIMETER ADULT SPO2 RD SET (20EA/BX)

## (undated) DEVICE — TUBE ENDOBRONCHEAL 37FR (1/EA)

## (undated) DEVICE — SUTURE 2-0 VICRYL PLUS CT-1 36 (36PK/BX)"